# Patient Record
Sex: FEMALE | Race: WHITE | Employment: UNEMPLOYED | URBAN - NONMETROPOLITAN AREA
[De-identification: names, ages, dates, MRNs, and addresses within clinical notes are randomized per-mention and may not be internally consistent; named-entity substitution may affect disease eponyms.]

---

## 2017-07-12 ENCOUNTER — TELEPHONE (OUTPATIENT)
Dept: FAMILY MEDICINE CLINIC | Age: 42
End: 2017-07-12

## 2019-03-21 ENCOUNTER — HOSPITAL ENCOUNTER (EMERGENCY)
Age: 44
Discharge: HOME | End: 2019-03-21
Attending: EMERGENCY MEDICINE
Payer: COMMERCIAL

## 2019-03-21 ENCOUNTER — APPOINTMENT (OUTPATIENT)
Dept: GENERAL RADIOLOGY | Age: 44
End: 2019-03-21
Payer: COMMERCIAL

## 2019-03-21 VITALS
DIASTOLIC BLOOD PRESSURE: 106 MMHG | OXYGEN SATURATION: 94 % | TEMPERATURE: 97.9 F | BODY MASS INDEX: 43 KG/M2 | HEART RATE: 87 BPM | WEIGHT: 219 LBS | SYSTOLIC BLOOD PRESSURE: 145 MMHG | HEIGHT: 60 IN | RESPIRATION RATE: 18 BRPM

## 2019-03-21 DIAGNOSIS — R07.9 CHEST PAIN, UNSPECIFIED TYPE: Primary | ICD-10-CM

## 2019-03-21 DIAGNOSIS — R06.00 DYSPNEA, UNSPECIFIED TYPE: ICD-10-CM

## 2019-03-21 LAB
ALBUMIN SERPL-MCNC: 4.2 GM/DL (ref 3.4–5)
ALP BLD-CCNC: 73 IU/L (ref 40–129)
ALT SERPL-CCNC: 16 U/L (ref 10–40)
ANION GAP SERPL CALCULATED.3IONS-SCNC: 12 MMOL/L (ref 4–16)
AST SERPL-CCNC: 24 IU/L (ref 15–37)
BASOPHILS ABSOLUTE: 0.1 K/CU MM
BASOPHILS RELATIVE PERCENT: 0.6 % (ref 0–1)
BILIRUB SERPL-MCNC: 0.4 MG/DL (ref 0–1)
BUN BLDV-MCNC: 15 MG/DL (ref 6–23)
CALCIUM SERPL-MCNC: 9.8 MG/DL (ref 8.3–10.6)
CHLORIDE BLD-SCNC: 101 MMOL/L (ref 99–110)
CO2: 28 MMOL/L (ref 21–32)
CREAT SERPL-MCNC: 0.7 MG/DL (ref 0.6–1.1)
DIFFERENTIAL TYPE: ABNORMAL
EOSINOPHILS ABSOLUTE: 0.2 K/CU MM
EOSINOPHILS RELATIVE PERCENT: 2.8 % (ref 0–3)
GFR AFRICAN AMERICAN: >60 ML/MIN/1.73M2
GFR NON-AFRICAN AMERICAN: >60 ML/MIN/1.73M2
GLUCOSE BLD-MCNC: 81 MG/DL (ref 70–99)
HCT VFR BLD CALC: 43.4 % (ref 37–47)
HEMOGLOBIN: 13.5 GM/DL (ref 12.5–16)
IMMATURE NEUTROPHIL %: 0.4 % (ref 0–0.43)
LYMPHOCYTES ABSOLUTE: 2.2 K/CU MM
LYMPHOCYTES RELATIVE PERCENT: 26.9 % (ref 24–44)
MCH RBC QN AUTO: 29.7 PG (ref 27–31)
MCHC RBC AUTO-ENTMCNC: 31.1 % (ref 32–36)
MCV RBC AUTO: 95.4 FL (ref 78–100)
MONOCYTES ABSOLUTE: 0.4 K/CU MM
MONOCYTES RELATIVE PERCENT: 5 % (ref 0–4)
PDW BLD-RTO: 12.9 % (ref 11.7–14.9)
PLATELET # BLD: 289 K/CU MM (ref 140–440)
PMV BLD AUTO: 11.2 FL (ref 7.5–11.1)
POTASSIUM SERPL-SCNC: 4.4 MMOL/L (ref 3.5–5.1)
RBC # BLD: 4.55 M/CU MM (ref 4.2–5.4)
SEGMENTED NEUTROPHILS ABSOLUTE COUNT: 5.2 K/CU MM
SEGMENTED NEUTROPHILS RELATIVE PERCENT: 64.3 % (ref 36–66)
SODIUM BLD-SCNC: 141 MMOL/L (ref 135–145)
TOTAL IMMATURE NEUTOROPHIL: 0.03 K/CU MM
TOTAL PROTEIN: 7.3 GM/DL (ref 6.4–8.2)
TROPONIN T: <0.01 NG/ML
TROPONIN T: <0.01 NG/ML
WBC # BLD: 8.2 K/CU MM (ref 4–10.5)

## 2019-03-21 PROCEDURE — 99285 EMERGENCY DEPT VISIT HI MDM: CPT

## 2019-03-21 PROCEDURE — 6370000000 HC RX 637 (ALT 250 FOR IP): Performed by: EMERGENCY MEDICINE

## 2019-03-21 PROCEDURE — 80053 COMPREHEN METABOLIC PANEL: CPT

## 2019-03-21 PROCEDURE — 71046 X-RAY EXAM CHEST 2 VIEWS: CPT

## 2019-03-21 PROCEDURE — 96374 THER/PROPH/DIAG INJ IV PUSH: CPT

## 2019-03-21 PROCEDURE — 85025 COMPLETE CBC W/AUTO DIFF WBC: CPT

## 2019-03-21 PROCEDURE — 6360000002 HC RX W HCPCS: Performed by: EMERGENCY MEDICINE

## 2019-03-21 PROCEDURE — 84484 ASSAY OF TROPONIN QUANT: CPT

## 2019-03-21 PROCEDURE — 2580000003 HC RX 258: Performed by: EMERGENCY MEDICINE

## 2019-03-21 PROCEDURE — 93005 ELECTROCARDIOGRAM TRACING: CPT | Performed by: EMERGENCY MEDICINE

## 2019-03-21 RX ORDER — ACETAMINOPHEN 500 MG
1000 TABLET ORAL ONCE
Status: COMPLETED | OUTPATIENT
Start: 2019-03-21 | End: 2019-03-21

## 2019-03-21 RX ORDER — ASPIRIN 81 MG/1
324 TABLET, CHEWABLE ORAL ONCE
Status: COMPLETED | OUTPATIENT
Start: 2019-03-21 | End: 2019-03-21

## 2019-03-21 RX ORDER — KETOROLAC TROMETHAMINE 30 MG/ML
15 INJECTION, SOLUTION INTRAMUSCULAR; INTRAVENOUS ONCE
Status: COMPLETED | OUTPATIENT
Start: 2019-03-21 | End: 2019-03-21

## 2019-03-21 RX ORDER — LISINOPRIL 10 MG/1
10 TABLET ORAL ONCE
Status: COMPLETED | OUTPATIENT
Start: 2019-03-21 | End: 2019-03-21

## 2019-03-21 RX ORDER — SODIUM CHLORIDE 0.9 % (FLUSH) 0.9 %
10 SYRINGE (ML) INJECTION PRN
Status: DISCONTINUED | OUTPATIENT
Start: 2019-03-21 | End: 2019-03-21 | Stop reason: HOSPADM

## 2019-03-21 RX ORDER — LISINOPRIL 5 MG/1
5 TABLET ORAL DAILY
Qty: 30 TABLET | Refills: 1 | Status: SHIPPED | OUTPATIENT
Start: 2019-03-21 | End: 2019-09-28

## 2019-03-21 RX ORDER — BUSPIRONE HYDROCHLORIDE 10 MG/1
10 TABLET ORAL 2 TIMES DAILY
COMMUNITY
End: 2020-09-20

## 2019-03-21 RX ORDER — LORAZEPAM 1 MG/1
1 TABLET ORAL ONCE
Status: COMPLETED | OUTPATIENT
Start: 2019-03-21 | End: 2019-03-21

## 2019-03-21 RX ADMIN — LORAZEPAM 1 MG: 1 TABLET ORAL at 16:26

## 2019-03-21 RX ADMIN — ACETAMINOPHEN 1000 MG: 500 TABLET ORAL at 17:22

## 2019-03-21 RX ADMIN — ASPIRIN 81 MG 324 MG: 81 TABLET ORAL at 16:26

## 2019-03-21 RX ADMIN — SODIUM CHLORIDE, PRESERVATIVE FREE 10 ML: 5 INJECTION INTRAVENOUS at 16:33

## 2019-03-21 RX ADMIN — KETOROLAC TROMETHAMINE 15 MG: 30 INJECTION, SOLUTION INTRAMUSCULAR; INTRAVENOUS at 17:50

## 2019-03-21 RX ADMIN — SODIUM CHLORIDE, PRESERVATIVE FREE 10 ML: 5 INJECTION INTRAVENOUS at 17:50

## 2019-03-21 RX ADMIN — LISINOPRIL 10 MG: 10 TABLET ORAL at 18:29

## 2019-03-21 ASSESSMENT — PAIN DESCRIPTION - DESCRIPTORS: DESCRIPTORS: SHARP

## 2019-03-21 ASSESSMENT — PAIN DESCRIPTION - LOCATION
LOCATION: CHEST
LOCATION: CHEST

## 2019-03-21 ASSESSMENT — PAIN SCALES - GENERAL
PAINLEVEL_OUTOF10: 7

## 2019-03-21 ASSESSMENT — PAIN DESCRIPTION - PAIN TYPE: TYPE: ACUTE PAIN

## 2019-03-21 ASSESSMENT — HEART SCORE: ECG: 0

## 2019-03-21 ASSESSMENT — PAIN DESCRIPTION - ORIENTATION: ORIENTATION: MID;UPPER

## 2019-03-22 LAB
EKG ATRIAL RATE: 84 BPM
EKG DIAGNOSIS: NORMAL
EKG P AXIS: 40 DEGREES
EKG P-R INTERVAL: 162 MS
EKG Q-T INTERVAL: 374 MS
EKG QRS DURATION: 82 MS
EKG QTC CALCULATION (BAZETT): 441 MS
EKG R AXIS: 27 DEGREES
EKG T AXIS: 36 DEGREES
EKG VENTRICULAR RATE: 84 BPM

## 2019-03-22 PROCEDURE — 93010 ELECTROCARDIOGRAM REPORT: CPT | Performed by: INTERNAL MEDICINE

## 2019-04-09 ENCOUNTER — HOSPITAL ENCOUNTER (EMERGENCY)
Age: 44
Discharge: HOME | End: 2019-04-09
Attending: EMERGENCY MEDICINE
Payer: COMMERCIAL

## 2019-04-09 VITALS
SYSTOLIC BLOOD PRESSURE: 99 MMHG | HEART RATE: 89 BPM | WEIGHT: 219 LBS | RESPIRATION RATE: 14 BRPM | HEIGHT: 60 IN | BODY MASS INDEX: 43 KG/M2 | DIASTOLIC BLOOD PRESSURE: 68 MMHG | TEMPERATURE: 98.9 F | OXYGEN SATURATION: 99 %

## 2019-04-09 DIAGNOSIS — R11.2 NON-INTRACTABLE VOMITING WITH NAUSEA, UNSPECIFIED VOMITING TYPE: Primary | ICD-10-CM

## 2019-04-09 PROCEDURE — 2580000003 HC RX 258: Performed by: EMERGENCY MEDICINE

## 2019-04-09 PROCEDURE — 99283 EMERGENCY DEPT VISIT LOW MDM: CPT

## 2019-04-09 PROCEDURE — 96374 THER/PROPH/DIAG INJ IV PUSH: CPT

## 2019-04-09 PROCEDURE — 6360000002 HC RX W HCPCS: Performed by: EMERGENCY MEDICINE

## 2019-04-09 RX ORDER — ZOLPIDEM TARTRATE 5 MG/1
5 TABLET ORAL NIGHTLY PRN
COMMUNITY
End: 2019-09-28

## 2019-04-09 RX ORDER — ONDANSETRON 2 MG/ML
4 INJECTION INTRAMUSCULAR; INTRAVENOUS EVERY 6 HOURS PRN
Status: DISCONTINUED | OUTPATIENT
Start: 2019-04-09 | End: 2019-04-09 | Stop reason: HOSPADM

## 2019-04-09 RX ORDER — ONDANSETRON 4 MG/1
4 TABLET, ORALLY DISINTEGRATING ORAL EVERY 8 HOURS PRN
Qty: 8 TABLET | Refills: 1 | Status: SHIPPED | OUTPATIENT
Start: 2019-04-09 | End: 2019-09-28

## 2019-04-09 RX ORDER — 0.9 % SODIUM CHLORIDE 0.9 %
1000 INTRAVENOUS SOLUTION INTRAVENOUS ONCE
Status: COMPLETED | OUTPATIENT
Start: 2019-04-09 | End: 2019-04-09

## 2019-04-09 RX ADMIN — ONDANSETRON 4 MG: 2 INJECTION INTRAMUSCULAR; INTRAVENOUS at 09:35

## 2019-04-09 RX ADMIN — SODIUM CHLORIDE 1000 ML: 9 INJECTION, SOLUTION INTRAVENOUS at 09:35

## 2019-04-09 ASSESSMENT — ENCOUNTER SYMPTOMS
VOMITING: 1
NAUSEA: 1
ABDOMINAL PAIN: 0
DIARRHEA: 0
RESPIRATORY NEGATIVE: 1

## 2019-04-09 ASSESSMENT — PAIN DESCRIPTION - DESCRIPTORS: DESCRIPTORS: CRAMPING

## 2019-04-09 ASSESSMENT — PAIN DESCRIPTION - LOCATION: LOCATION: ABDOMEN

## 2019-04-09 ASSESSMENT — PAIN DESCRIPTION - PAIN TYPE: TYPE: ACUTE PAIN

## 2019-04-09 ASSESSMENT — PAIN SCALES - GENERAL: PAINLEVEL_OUTOF10: 7

## 2019-04-09 NOTE — ED PROVIDER NOTES
Triage Chief Complaint:    Emesis (Pt arrives ambulatory stating since 1030 last night she has been vomiting, unable to hold down water, body aching and feels like she is starting to have diarrhea)    LD Mendez is a 45 y.o. female that presents To the ED by result of about onset of nausea and vomiting abdominal cramping. She has runs of family members with similar symptoms recently her symptoms began at 10:30. She was up all night vomiting about 20 episodes. She has not had any diarrhea. She denies any fevers or chills myalgias. She is on no recent atraumatic sob. No recent international travel. There is been no blood or mucus per rectum.   No other high-risk features or red flags noted    Past Medical History:   Diagnosis Date    Anxiety     Anxiety     Depression     Headache     migraines    Palpitation     Thyroid disease      Past Surgical History:   Procedure Laterality Date    BREAST REDUCTION SURGERY      HYSTERECTOMY      THYROIDECTOMY, PARTIAL      1992     Family History   Problem Relation Age of Onset    Cancer Mother     Heart Disease Mother     Cancer Father     Heart Disease Father      Social History     Socioeconomic History    Marital status:      Spouse name: Not on file    Number of children: Not on file    Years of education: Not on file    Highest education level: Not on file   Occupational History    Not on file   Social Needs    Financial resource strain: Not on file    Food insecurity:     Worry: Not on file     Inability: Not on file    Transportation needs:     Medical: Not on file     Non-medical: Not on file   Tobacco Use    Smoking status: Never Smoker    Smokeless tobacco: Never Used   Substance and Sexual Activity    Alcohol use: No    Drug use: No    Sexual activity: Yes     Partners: Male   Lifestyle    Physical activity:     Days per week: Not on file     Minutes per session: Not on file    Stress: Not on file   Relationships    Social connections:     Talks on phone: Not on file     Gets together: Not on file     Attends Yazidi service: Not on file     Active member of club or organization: Not on file     Attends meetings of clubs or organizations: Not on file     Relationship status: Not on file    Intimate partner violence:     Fear of current or ex partner: Not on file     Emotionally abused: Not on file     Physically abused: Not on file     Forced sexual activity: Not on file   Other Topics Concern    Not on file   Social History Narrative    Not on file     Current Facility-Administered Medications   Medication Dose Route Frequency Provider Last Rate Last Dose    0.9 % sodium chloride bolus  1,000 mL Intravenous Once Benay Sooe, DO        ondansetron [facility]) injection 4 mg  4 mg Intravenous Q6H PRN Benjace Tripathi, DO         Current Outpatient Medications   Medication Sig Dispense Refill    zolpidem (AMBIEN) 5 MG tablet Take 5 mg by mouth nightly as needed for Sleep.  busPIRone (BUSPAR) 10 MG tablet Take 10 mg by mouth 2 times daily      lisinopril (PRINIVIL;ZESTRIL) 5 MG tablet Take 1 tablet by mouth daily 30 tablet 1    sertraline (ZOLOFT) 100 MG tablet Take 150 mg by mouth daily      acetaminophen (APAP EXTRA STRENGTH) 500 MG tablet Take 1 tablet by mouth every 6 hours as needed for Pain 120 tablet 0     Allergies   Allergen Reactions    Benadryl [Diphenhydramine] Anxiety     Severe anxiety and agitation    Nsaids Rash         ROS:    Review of Systems   Respiratory: Negative. Cardiovascular: Negative. Gastrointestinal: Positive for nausea and vomiting. Negative for abdominal pain and diarrhea. All other systems reviewed and are negative.       Nursing Notes Reviewed    Physical Exam:  ED Triage Vitals [04/09/19 0912]   Enc Vitals Group      /85      Pulse 96      Resp 18      Temp 98.9 °F (37.2 °C)      Temp Source Oral      SpO2 99 %      Weight 219 lb (99.3 kg)      Height 5' (1.524 m) Head Circumference       Peak Flow       Pain Score       Pain Loc       Pain Edu? Excl. in [address]? Physical Exam   Constitutional: She is oriented to person, place, and time. Vital signs are normal. She appears well-developed and well-nourished. She appears ill. No distress. HENT:   Head: Normocephalic and atraumatic. Right Ear: External ear normal.   Left Ear: External ear normal.   Nose: Nose normal.   Mouth/Throat: Oropharynx is clear and moist.   Eyes: Pupils are equal, round, and reactive to light. Neck: Normal range of motion. Neck supple. Cardiovascular: Normal rate, regular rhythm, normal heart sounds and intact distal pulses. Pulmonary/Chest: Effort normal and breath sounds normal.   Abdominal: Bowel sounds are normal. She exhibits no distension and no mass. There is no tenderness. There is no rebound and no guarding. No hernia. Musculoskeletal: Normal range of motion. Neurological: She is alert and oriented to person, place, and time. Sensory deficit:      Skin: Skin is warm and dry. Capillary refill takes less than 2 seconds. She is not diaphoretic. Psychiatric: She has a normal mood and affect. Nursing note and vitals reviewed. I have reviewed and interpreted all of the currently available lab results from this visit (ifapplicable):  No results found for this visit on 04/09/19.    Radiographs (if obtained):  [] The following radiograph wasinterpreted by myself in the absence of a radiologist:   [] Radiologist's Report Reviewed:  No orders to display         EKG (if obtained): (All EKG's are interpreted by myself in the absence of a cardiologist)    Chart review shows recent radiographs:  Xr Chest Standard (2 Vw)    Result Date: 3/21/2019  EXAMINATION: TWO VIEWS OF THE CHEST 3/21/2019 4:12 pm COMPARISON: Chest x-ray January 20, 2018 HISTORY: ORDERING SYSTEM PROVIDED HISTORY: shortness of breath, CP TECHNOLOGIST PROVIDED HISTORY: Reason for exam:->shortness of breath, CP

## 2019-09-28 ENCOUNTER — HOSPITAL ENCOUNTER (EMERGENCY)
Age: 44
Discharge: HOME | End: 2019-09-28
Attending: EMERGENCY MEDICINE
Payer: COMMERCIAL

## 2019-09-28 ENCOUNTER — APPOINTMENT (OUTPATIENT)
Dept: CT IMAGING | Age: 44
End: 2019-09-28
Payer: COMMERCIAL

## 2019-09-28 VITALS
RESPIRATION RATE: 16 BRPM | HEART RATE: 79 BPM | HEIGHT: 60 IN | OXYGEN SATURATION: 100 % | DIASTOLIC BLOOD PRESSURE: 70 MMHG | TEMPERATURE: 98.4 F | WEIGHT: 230 LBS | SYSTOLIC BLOOD PRESSURE: 116 MMHG | BODY MASS INDEX: 45.16 KG/M2

## 2019-09-28 DIAGNOSIS — R10.13 ABDOMINAL PAIN, EPIGASTRIC: Primary | ICD-10-CM

## 2019-09-28 LAB
ALBUMIN SERPL-MCNC: 4.6 GM/DL (ref 3.4–5)
ALP BLD-CCNC: 82 IU/L (ref 40–129)
ALT SERPL-CCNC: 16 U/L (ref 10–40)
ANION GAP SERPL CALCULATED.3IONS-SCNC: 4 MMOL/L (ref 4–16)
AST SERPL-CCNC: 20 IU/L (ref 15–37)
BACTERIA: ABNORMAL /HPF
BASOPHILS ABSOLUTE: 0.1 K/CU MM
BASOPHILS RELATIVE PERCENT: 0.7 % (ref 0–1)
BILIRUB SERPL-MCNC: 0.9 MG/DL (ref 0–1)
BILIRUBIN URINE: NEGATIVE MG/DL
BLOOD, URINE: NEGATIVE
BUN BLDV-MCNC: 16 MG/DL (ref 6–23)
CALCIUM SERPL-MCNC: 9.9 MG/DL (ref 8.3–10.6)
CAST TYPE: NEGATIVE /HPF
CHLORIDE BLD-SCNC: 101 MMOL/L (ref 99–110)
CLARITY: ABNORMAL
CO2: 37 MMOL/L (ref 21–32)
COLOR: YELLOW
CREAT SERPL-MCNC: 0.8 MG/DL (ref 0.6–1.1)
CRYSTAL TYPE: NEGATIVE /HPF
DIFFERENTIAL TYPE: ABNORMAL
EOSINOPHILS ABSOLUTE: 0.3 K/CU MM
EOSINOPHILS RELATIVE PERCENT: 4.8 % (ref 0–3)
EPITHELIAL CELLS, UA: ABNORMAL /HPF
GFR AFRICAN AMERICAN: >60 ML/MIN/1.73M2
GFR NON-AFRICAN AMERICAN: >60 ML/MIN/1.73M2
GLUCOSE BLD-MCNC: 94 MG/DL (ref 70–99)
GLUCOSE, URINE: NEGATIVE MG/DL
HCT VFR BLD CALC: 41.1 % (ref 37–47)
HEMOGLOBIN: 12.9 GM/DL (ref 12.5–16)
IMMATURE NEUTROPHIL %: 0.6 % (ref 0–0.43)
KETONES, URINE: NEGATIVE MG/DL
LACTATE: 0.7 MMOL/L (ref 0.4–2)
LEUKOCYTE ESTERASE, URINE: NEGATIVE
LIPASE: 39 IU/L (ref 13–60)
LYMPHOCYTES ABSOLUTE: 1.7 K/CU MM
LYMPHOCYTES RELATIVE PERCENT: 24.6 % (ref 24–44)
MCH RBC QN AUTO: 29.7 PG (ref 27–31)
MCHC RBC AUTO-ENTMCNC: 31.4 % (ref 32–36)
MCV RBC AUTO: 94.7 FL (ref 78–100)
MONOCYTES ABSOLUTE: 0.4 K/CU MM
MONOCYTES RELATIVE PERCENT: 5.3 % (ref 0–4)
NITRITE URINE, QUANTITATIVE: NEGATIVE
PDW BLD-RTO: 13.3 % (ref 11.7–14.9)
PH, URINE: 6.5 (ref 5–8)
PLATELET # BLD: 291 K/CU MM (ref 140–440)
PMV BLD AUTO: 10.7 FL (ref 7.5–11.1)
POTASSIUM SERPL-SCNC: 3.9 MMOL/L (ref 3.5–5.1)
PROTEIN UA: NEGATIVE MG/DL
RBC # BLD: 4.34 M/CU MM (ref 4.2–5.4)
RBC URINE: NEGATIVE /HPF (ref 0–6)
SEGMENTED NEUTROPHILS ABSOLUTE COUNT: 4.4 K/CU MM
SEGMENTED NEUTROPHILS RELATIVE PERCENT: 64 % (ref 36–66)
SODIUM BLD-SCNC: 142 MMOL/L (ref 135–145)
SPECIFIC GRAVITY UA: 1.01 (ref 1–1.03)
TOTAL IMMATURE NEUTOROPHIL: 0.04 K/CU MM
TOTAL PROTEIN: 7.6 GM/DL (ref 6.4–8.2)
UROBILINOGEN, URINE: 0.2 MG/DL (ref 0.2–1)
WBC # BLD: 6.9 K/CU MM (ref 4–10.5)
WBC UA: NEGATIVE /HPF (ref 0–5)

## 2019-09-28 PROCEDURE — 80053 COMPREHEN METABOLIC PANEL: CPT

## 2019-09-28 PROCEDURE — 6360000002 HC RX W HCPCS: Performed by: EMERGENCY MEDICINE

## 2019-09-28 PROCEDURE — 2500000003 HC RX 250 WO HCPCS: Performed by: EMERGENCY MEDICINE

## 2019-09-28 PROCEDURE — 85025 COMPLETE CBC W/AUTO DIFF WBC: CPT

## 2019-09-28 PROCEDURE — 96374 THER/PROPH/DIAG INJ IV PUSH: CPT

## 2019-09-28 PROCEDURE — 96375 TX/PRO/DX INJ NEW DRUG ADDON: CPT

## 2019-09-28 PROCEDURE — 2580000003 HC RX 258: Performed by: EMERGENCY MEDICINE

## 2019-09-28 PROCEDURE — 99284 EMERGENCY DEPT VISIT MOD MDM: CPT

## 2019-09-28 PROCEDURE — 6360000004 HC RX CONTRAST MEDICATION: Performed by: EMERGENCY MEDICINE

## 2019-09-28 PROCEDURE — 74177 CT ABD & PELVIS W/CONTRAST: CPT

## 2019-09-28 PROCEDURE — 83690 ASSAY OF LIPASE: CPT

## 2019-09-28 PROCEDURE — 83605 ASSAY OF LACTIC ACID: CPT

## 2019-09-28 PROCEDURE — 81001 URINALYSIS AUTO W/SCOPE: CPT

## 2019-09-28 RX ORDER — 0.9 % SODIUM CHLORIDE 0.9 %
1000 INTRAVENOUS SOLUTION INTRAVENOUS ONCE
Status: COMPLETED | OUTPATIENT
Start: 2019-09-28 | End: 2019-09-28

## 2019-09-28 RX ORDER — FAMOTIDINE 20 MG/1
20 TABLET, FILM COATED ORAL 2 TIMES DAILY
Qty: 60 TABLET | Refills: 0 | Status: SHIPPED | OUTPATIENT
Start: 2019-09-28 | End: 2020-03-01 | Stop reason: ALTCHOICE

## 2019-09-28 RX ORDER — DICYCLOMINE HYDROCHLORIDE 10 MG/1
20 CAPSULE ORAL EVERY 6 HOURS PRN
Qty: 40 CAPSULE | Refills: 0 | Status: SHIPPED | OUTPATIENT
Start: 2019-09-28 | End: 2020-03-01 | Stop reason: ALTCHOICE

## 2019-09-28 RX ORDER — SODIUM CHLORIDE 9 MG/ML
INJECTION, SOLUTION INTRAVENOUS CONTINUOUS
Status: DISCONTINUED | OUTPATIENT
Start: 2019-09-28 | End: 2019-09-28 | Stop reason: HOSPADM

## 2019-09-28 RX ORDER — ONDANSETRON 2 MG/ML
4 INJECTION INTRAMUSCULAR; INTRAVENOUS EVERY 30 MIN PRN
Status: DISCONTINUED | OUTPATIENT
Start: 2019-09-28 | End: 2019-09-28 | Stop reason: HOSPADM

## 2019-09-28 RX ORDER — ONDANSETRON 4 MG/1
4 TABLET, ORALLY DISINTEGRATING ORAL EVERY 6 HOURS PRN
Qty: 10 TABLET | Refills: 0 | Status: SHIPPED | OUTPATIENT
Start: 2019-09-28 | End: 2020-03-01 | Stop reason: ALTCHOICE

## 2019-09-28 RX ADMIN — IOPAMIDOL 75 ML: 755 INJECTION, SOLUTION INTRAVENOUS at 13:15

## 2019-09-28 RX ADMIN — ONDANSETRON 4 MG: 2 INJECTION INTRAMUSCULAR; INTRAVENOUS at 12:46

## 2019-09-28 RX ADMIN — FAMOTIDINE 20 MG: 10 INJECTION, SOLUTION INTRAVENOUS at 12:46

## 2019-09-28 RX ADMIN — SODIUM CHLORIDE 1000 ML: 9 INJECTION, SOLUTION INTRAVENOUS at 12:45

## 2019-09-28 RX ADMIN — SODIUM CHLORIDE: 9 INJECTION, SOLUTION INTRAVENOUS at 12:45

## 2019-09-28 ASSESSMENT — PAIN DESCRIPTION - ORIENTATION: ORIENTATION: MID;UPPER

## 2019-09-28 ASSESSMENT — PAIN DESCRIPTION - PAIN TYPE: TYPE: ACUTE PAIN

## 2019-09-28 ASSESSMENT — PAIN DESCRIPTION - DESCRIPTORS: DESCRIPTORS: ACHING;CONSTANT

## 2019-09-28 ASSESSMENT — PAIN SCALES - GENERAL: PAINLEVEL_OUTOF10: 7

## 2019-09-28 ASSESSMENT — PAIN DESCRIPTION - LOCATION: LOCATION: ABDOMEN

## 2020-03-01 ENCOUNTER — HOSPITAL ENCOUNTER (EMERGENCY)
Age: 45
Discharge: HOME | End: 2020-03-01
Attending: EMERGENCY MEDICINE

## 2020-03-01 ENCOUNTER — APPOINTMENT (OUTPATIENT)
Dept: ULTRASOUND IMAGING | Age: 45
End: 2020-03-01

## 2020-03-01 VITALS
BODY MASS INDEX: 46.72 KG/M2 | HEART RATE: 97 BPM | OXYGEN SATURATION: 99 % | DIASTOLIC BLOOD PRESSURE: 80 MMHG | SYSTOLIC BLOOD PRESSURE: 150 MMHG | TEMPERATURE: 98.7 F | HEIGHT: 60 IN | WEIGHT: 238 LBS | RESPIRATION RATE: 16 BRPM

## 2020-03-01 PROCEDURE — 93971 EXTREMITY STUDY: CPT

## 2020-03-01 PROCEDURE — 99284 EMERGENCY DEPT VISIT MOD MDM: CPT

## 2020-03-01 PROCEDURE — 6370000000 HC RX 637 (ALT 250 FOR IP): Performed by: EMERGENCY MEDICINE

## 2020-03-01 RX ORDER — METHYLPREDNISOLONE 4 MG/1
TABLET ORAL
Qty: 1 KIT | Refills: 0 | Status: SHIPPED | OUTPATIENT
Start: 2020-03-01 | End: 2020-04-05 | Stop reason: ALTCHOICE

## 2020-03-01 RX ORDER — CYCLOBENZAPRINE HCL 10 MG
10 TABLET ORAL 3 TIMES DAILY PRN
Qty: 30 TABLET | Refills: 0 | Status: SHIPPED | OUTPATIENT
Start: 2020-03-01 | End: 2020-03-11

## 2020-03-01 RX ORDER — HYDROCODONE BITARTRATE AND ACETAMINOPHEN 5; 325 MG/1; MG/1
1 TABLET ORAL ONCE
Status: COMPLETED | OUTPATIENT
Start: 2020-03-01 | End: 2020-03-01

## 2020-03-01 RX ADMIN — HYDROCODONE BITARTRATE AND ACETAMINOPHEN 1 TABLET: 5; 325 TABLET ORAL at 11:41

## 2020-03-01 ASSESSMENT — PAIN DESCRIPTION - DESCRIPTORS: DESCRIPTORS: SHARP;ACHING

## 2020-03-01 ASSESSMENT — PAIN DESCRIPTION - ORIENTATION: ORIENTATION: MID;LOWER

## 2020-03-01 ASSESSMENT — PAIN SCALES - GENERAL: PAINLEVEL_OUTOF10: 7

## 2020-03-01 ASSESSMENT — PAIN DESCRIPTION - PAIN TYPE: TYPE: ACUTE PAIN

## 2020-03-01 ASSESSMENT — PAIN DESCRIPTION - LOCATION: LOCATION: LEG;KNEE

## 2020-03-01 ASSESSMENT — PAIN DESCRIPTION - FREQUENCY: FREQUENCY: CONTINUOUS

## 2020-03-01 NOTE — ED PROVIDER NOTES
Emergency Department Encounter  Location: [place] [facility] [address] [place] [address]    Patient: Lia Villaseñor  MRN: [id number]  : 1975  Date of evaluation: 3/1/2020  ED Provider: Ava Morales DO, FACEP    Chief Complaint:    Leg Pain ( began having pain in left leg behind knee radiating down into her foot. Pain begining around 0200 this morning.  has been taking tylenol with little relief. Denies recent accident or injury. )    [place]:  Lia Villaseñor is a 39 y.o. female that presents to the emergency department with complaints of pain in her left leg that awoke her from sleep at 2 AM.  She is complaining of pain in her left posterior thigh which radiates downward into the popliteal space of her left knee. She also has pain along the entire lower extremity from her knee down to her ankle. ROS:  At least 4 systems reviewed and otherwise acutely negative except as in the [address] Past Medical History:   Diagnosis Date    Anxiety     Anxiety     Depression     Headache     migraines    Palpitation     Thyroid disease      Past Surgical History:   Procedure Laterality Date    BREAST REDUCTION SURGERY      HYSTERECTOMY      THYROIDECTOMY, PARTIAL           Family History   Problem Relation Age of Onset    Cancer Mother     Heart Disease Mother     Cancer Father     Heart Disease Father      Social History     Socioeconomic History    Marital status:      Spouse name: Not on file    Number of children: Not on file    Years of education: Not on file    Highest education level: Not on file   Occupational History    Not on file   Social Needs    Financial resource strain: Not on file    Food insecurity:     Worry: Not on file     Inability: Not on file    Transportation needs:     Medical: Not on file     Non-medical: Not on file   Tobacco Use    Smoking status: Never Smoker    Smokeless tobacco: Never Used   Substance and Sexual Activity    Alcohol use: No    Drug use:  No occasionally words are mis-transcribed.)    Alfredo Carias DO, [address] [facility]  Board certified in [address] [place] [address] Gamal Carias, [place]  03/01/20 2460

## 2020-04-05 ENCOUNTER — HOSPITAL ENCOUNTER (EMERGENCY)
Age: 45
Discharge: HOME | End: 2020-04-05
Attending: EMERGENCY MEDICINE
Payer: COMMERCIAL

## 2020-04-05 VITALS
SYSTOLIC BLOOD PRESSURE: 128 MMHG | TEMPERATURE: 99.8 F | OXYGEN SATURATION: 100 % | RESPIRATION RATE: 17 BRPM | HEART RATE: 82 BPM | DIASTOLIC BLOOD PRESSURE: 75 MMHG | BODY MASS INDEX: 47.12 KG/M2 | WEIGHT: 240 LBS | HEIGHT: 60 IN

## 2020-04-05 PROCEDURE — 87635 SARS-COV-2 COVID-19 AMP PRB: CPT

## 2020-04-05 PROCEDURE — U0002 COVID-19 LAB TEST NON-CDC: HCPCS

## 2020-04-05 PROCEDURE — ~ 87635 HC SO COVID-19 ANY TECHNIQUE NON-CDC

## 2020-04-05 PROCEDURE — 99283 EMERGENCY DEPT VISIT LOW MDM: CPT

## 2020-04-05 RX ORDER — ACETAMINOPHEN 500 MG
1000 TABLET ORAL ONCE
Status: DISCONTINUED | OUTPATIENT
Start: 2020-04-05 | End: 2020-04-05 | Stop reason: HOSPADM

## 2020-04-05 ASSESSMENT — ENCOUNTER SYMPTOMS
APNEA: 0
DIARRHEA: 1
TROUBLE SWALLOWING: 0
NAUSEA: 0
RECTAL PAIN: 0
VOICE CHANGE: 0
BACK PAIN: 0
CHEST TIGHTNESS: 0
ANAL BLEEDING: 0
VOMITING: 0
WHEEZING: 0
CHOKING: 0
STRIDOR: 0
SORE THROAT: 1
BLOOD IN STOOL: 0
CONSTIPATION: 0
ABDOMINAL PAIN: 0
ABDOMINAL DISTENTION: 0
COUGH: 1
SHORTNESS OF BREATH: 0
EYE PAIN: 0

## 2020-04-05 NOTE — ED PROVIDER NOTES
reviewed. REVIEW OF SYSTEMS    (2-9 systems for level 4, 10 or more for level 5)     Review of Systems   Constitutional: Positive for chills, fatigue and fever. Negative for activity change, appetite change, diaphoresis and unexpected weight change. HENT: Positive for congestion and sore throat. Negative for trouble swallowing and voice change. Eyes: Negative for pain and visual disturbance. Respiratory: Positive for cough. Negative for apnea, choking, chest tightness, shortness of breath, wheezing and stridor. Cardiovascular: Negative for chest pain, palpitations and leg swelling. Gastrointestinal: Positive for diarrhea. Negative for abdominal distention, abdominal pain, anal bleeding, blood in stool, constipation, nausea, rectal pain and vomiting. Endocrine: Negative for cold intolerance and heat intolerance. Genitourinary: Negative for decreased urine volume, dysuria and flank pain. Musculoskeletal: Positive for myalgias. Negative for arthralgias, back pain, neck pain and neck stiffness. Skin: Negative for rash and wound. Allergic/Immunologic: Negative for immunocompromised state. Neurological: Positive for headaches. Negative for weakness, light-headedness and numbness. Hematological: Negative for adenopathy. Does not bruise/bleed easily. Psychiatric/Behavioral: Negative for self-injury and suicidal ideas. Except as noted above the remainder of the review of systems was reviewed and negative.        PAST MEDICAL HISTORY     Past Medical History:   Diagnosis Date    Anxiety     Anxiety     Depression     Headache     migraines    Palpitation     Thyroid disease          SURGICAL HISTORY       Past Surgical History:   Procedure Laterality Date    BREAST REDUCTION SURGERY      HYSTERECTOMY      THYROIDECTOMY, PARTIAL      1992         CURRENT MEDICATIONS       Previous Medications    ACETAMINOPHEN (APAP EXTRA STRENGTH) 500 MG TABLET    Take 1 tablet by mouth every 6 ED Physician - none    LABS:  Labs Reviewed   COVID-19       All other labs were within normal range or not returned as of this dictation. EMERGENCY DEPARTMENT COURSE and DIFFERENTIAL DIAGNOSIS/MDM:   Vitals:    Vitals:    04/05/20 1133   BP: 128/82   Pulse: 83   Resp: 16   Temp: 100.4 °F (38 °C)   TempSrc: Oral   SpO2: 100%   Weight: 240 lb (108.9 kg)   Height: 5' (1.524 m)           MDM  Number of Diagnoses or Management Options  Diagnosis management comments: 44-year-old female presents emerge department with URI symptoms as described above. In the emergency room, patient is febrile but otherwise chemically normal acute distress nontoxic. Patient is not hypoxemic, not tachypneic not tachycardic normotensive clinically euvolemic breathing well lungs are clear. Differential includes likely viral URI. COVID is certainly consideration given the patient's typical viral symptoms. Doubt occult sepsis or serious bacterial infection. X-ray was deferred as you know there is pneumonia, most certainly viral.  The patient's BMI is greater than 40 and therefore pursuing priority 2 testing. Upon reevaluation, patient said that she felt well want to go home felt comfortable doing so. We discussed the importance of outpatient follow-up as well as strict return precautions including new change worsening symptoms.   Patient verbally acknowledged understood these instructions agreeable plan       Amount and/or Complexity of Data Reviewed  Clinical lab tests: ordered  Review and summarize past medical records: yes          REASSESSMENT              CONSULTS:  None    PROCEDURES:  Unless otherwise noted below, none     Procedures        FINAL IMPRESSION      1. COVID-19          DISPOSITION/PLAN   DISPOSITION Discharge - Pending Orders Complete 04/05/2020 11:57:21 AM      PATIENT REFERRED TO:  [facility] Emergency Department  [address] [facility]  [phone]    As needed if you have any health

## 2020-04-06 ENCOUNTER — CARE COORDINATION (OUTPATIENT)
Dept: CARE COORDINATION | Age: 45
End: 2020-04-06

## 2020-04-07 LAB
SARS-COV-2: NORMAL
SOURCE: NORMAL

## 2020-09-20 ENCOUNTER — HOSPITAL ENCOUNTER (EMERGENCY)
Age: 45
Discharge: HOME | End: 2020-09-20
Attending: EMERGENCY MEDICINE

## 2020-09-20 VITALS
WEIGHT: 240 LBS | HEART RATE: 82 BPM | TEMPERATURE: 98.1 F | RESPIRATION RATE: 16 BRPM | BODY MASS INDEX: 47.12 KG/M2 | DIASTOLIC BLOOD PRESSURE: 96 MMHG | SYSTOLIC BLOOD PRESSURE: 122 MMHG | HEIGHT: 60 IN | OXYGEN SATURATION: 100 %

## 2020-09-20 PROCEDURE — 87081 CULTURE SCREEN ONLY: CPT

## 2020-09-20 PROCEDURE — 6370000000 HC RX 637 (ALT 250 FOR IP): Performed by: EMERGENCY MEDICINE

## 2020-09-20 PROCEDURE — 99283 EMERGENCY DEPT VISIT LOW MDM: CPT

## 2020-09-20 PROCEDURE — 87077 CULTURE AEROBIC IDENTIFY: CPT

## 2020-09-20 PROCEDURE — 87430 STREP A AG IA: CPT

## 2020-09-20 RX ORDER — NAPROXEN 500 MG/1
500 TABLET ORAL ONCE
Status: COMPLETED | OUTPATIENT
Start: 2020-09-20 | End: 2020-09-20

## 2020-09-20 RX ADMIN — NAPROXEN 500 MG: 500 TABLET ORAL at 21:44

## 2020-09-20 ASSESSMENT — PAIN DESCRIPTION - PAIN TYPE: TYPE: ACUTE PAIN

## 2020-09-20 ASSESSMENT — PAIN DESCRIPTION - LOCATION: LOCATION: THROAT

## 2020-09-20 ASSESSMENT — PAIN SCALES - GENERAL
PAINLEVEL_OUTOF10: 6
PAINLEVEL_OUTOF10: 6

## 2020-09-21 ENCOUNTER — CARE COORDINATION (OUTPATIENT)
Dept: CARE COORDINATION | Age: 45
End: 2020-09-21

## 2020-09-21 NOTE — ED PROVIDER NOTES
Triage Chief Complaint:   Pharyngitis (sore throat which started this morning. states coworkers with strep and with Covid symptoms ); Headache (headache which started this morning); and Otalgia (bilateral earaches which started this morning)    [place]:  Geremias Gomez is a 39 y.o. female that presents with 1 day of sore throat worse with swallowing, bilateral earaches, headaches, fatigue. States multiple people at work have had strep throat. She denies any fevers, nausea, vomiting, diarrhea, chest pain, shortness of breath or cough. She has been taking Tylenol today. ROS:  At least 10 systems reviewed and otherwise acutely negative except as in the [address]     Past Medical History:   Diagnosis Date    Anxiety     Anxiety     Depression     Headache     migraines    Palpitation     Thyroid disease      Past Surgical History:   Procedure Laterality Date    BREAST REDUCTION SURGERY      HYSTERECTOMY      THYROIDECTOMY, PARTIAL      1992    TYMPANOSTOMY TUBE PLACEMENT       Family History   Problem Relation Age of Onset    Cancer Mother     Heart Disease Mother     Cancer Father     Heart Disease Father      Social History     Socioeconomic History    Marital status:      Spouse name: Not on file    Number of children: Not on file    Years of education: Not on file    Highest education level: Not on file   Occupational History    Not on file   Social Needs    Financial resource strain: Not on file    Food insecurity     Worry: Not on file     Inability: Not on file    Transportation needs     Medical: Not on file     Non-medical: Not on file   Tobacco Use    Smoking status: Never Smoker    Smokeless tobacco: Never Used   Substance and Sexual Activity    Alcohol use: No    Drug use: No    Sexual activity: Yes     Partners: Male   Lifestyle    Physical activity     Days per week: Not on file     Minutes per session: Not on file    Stress: Not on file   Relationships    Social connections Talks on phone: Not on file     Gets together: Not on file     Attends Jewish service: Not on file     Active member of club or organization: Not on file     Attends meetings of clubs or organizations: Not on file     Relationship status: Not on file    Intimate partner violence     Fear of current or ex partner: Not on file     Emotionally abused: Not on file     Physically abused: Not on file     Forced sexual activity: Not on file   Other Topics Concern    Not on file   Social History Narrative    Not on file     No current facility-administered medications for this encounter. Current Outpatient Medications   Medication Sig Dispense Refill    sertraline (ZOLOFT) 100 MG tablet Take 150 mg by mouth daily      acetaminophen (APAP EXTRA STRENGTH) 500 MG tablet Take 1 tablet by mouth every 6 hours as needed for Pain 120 tablet 0     Allergies   Allergen Reactions    Benadryl [Diphenhydramine] Anxiety     Severe anxiety and agitation. States can take it oral but not IV    Nsaids Rash       Nursing Notes Reviewed    Physical Exam:  ED Triage Vitals   Enc Vitals Group      BP 09/20/20 2126 (!) 122/96      Pulse 09/20/20 2126 82      Resp 09/20/20 2126 16      Temp 09/20/20 2126 98.1 °F (36.7 °C)      Temp Source 09/20/20 2126 Oral      SpO2 09/20/20 2126 100 %      Weight 09/20/20 2124 240 lb (108.9 kg)      Height 09/20/20 2124 5' (1.524 m)      Head Circumference --       Peak Flow --       Pain Score --       Pain Loc --       Pain Edu? --       Excl. in [address]? --      GENERAL APPEARANCE: Awake and alert. Cooperative. No acute distress. HEAD: Normocephalic. Atraumatic. EYES: EOM's grossly intact. Sclera anicteric. ENT: Mucous membranes are moist. Tolerates saliva. No trismus. Posterior pharyngeal erythema without exudate or asymmetry. No tonsillar exudate or hypertrophy. TMs clear bilaterally  NECK: No meningismus. No lymphadenopathy  HEART:  Extremities pink  LUNGS: Respirations unlabored. Even chest rise bilaterally  ABDOMEN: Non distended. EXTREMITIES: No acute deformities. SKIN: Dry  NEUROLOGICAL: No gross facial drooping. Moves all 4 extremities spontaneously. PSYCHIATRIC: Normal mood. I have reviewed and interpreted all of the currently available lab results from this visit (if applicable):  Results for orders placed or performed during the hospital encounter of 09/20/20   Strep Screen Group A Throat    Specimen: Throat   Result Value Ref Range    Specimen THROAT     Special Requests NONE     Strep A Direct Screen NEGATIVE       Radiographs (if obtained):  [] The following radiograph was interpreted by myself in the absence of a radiologist:  [] Radiologist's Report Reviewed:    EKG (if obtained): (All EKG's are interpreted by myself in the absence of a cardiologist)    MDM:  Plan of care is discussed thoroughly with the patient and family if present. If performed, all imaging and lab work also discussed with patient. All relevant prior results and chart reviewed if available. Patient presents as above. She is in no acute distress and has normal vital signs. Presents with signs and symptoms consistent with acute pharyngitis. No evidence of meningitis, deep neck tissue infection or other acute emergent process at this time. Patient is given naproxen for symptoms. Strep screen is negative. She is discharged with return precautions and encouraged to drink plenty of fluids at home, take Tylenol and ibuprofen. Clinical Impression:  1.  Acute pharyngitis, unspecified etiology      (Please note that portions of this note may have been completed with a voice recognition program. Efforts were made to edit the dictations but occasionally words are mis-transcribed.)    MD Krista Mirza MD  09/20/20 [id number]

## 2020-09-23 LAB
CULTURE: NORMAL
Lab: NORMAL
SPECIMEN: NORMAL
STREP A DIRECT SCREEN: NEGATIVE

## 2020-10-13 ENCOUNTER — APPOINTMENT (OUTPATIENT)
Dept: GENERAL RADIOLOGY | Age: 45
End: 2020-10-13

## 2020-10-13 ENCOUNTER — HOSPITAL ENCOUNTER (EMERGENCY)
Age: 45
Discharge: HOME | End: 2020-10-13
Attending: EMERGENCY MEDICINE

## 2020-10-13 VITALS
WEIGHT: 230 LBS | HEART RATE: 79 BPM | DIASTOLIC BLOOD PRESSURE: 98 MMHG | RESPIRATION RATE: 16 BRPM | HEIGHT: 61 IN | TEMPERATURE: 97.9 F | SYSTOLIC BLOOD PRESSURE: 128 MMHG | OXYGEN SATURATION: 97 % | BODY MASS INDEX: 43.43 KG/M2

## 2020-10-13 LAB
ALBUMIN SERPL-MCNC: 4.2 GM/DL (ref 3.4–5)
ALP BLD-CCNC: 80 IU/L (ref 40–129)
ALT SERPL-CCNC: 22 U/L (ref 10–40)
ANION GAP SERPL CALCULATED.3IONS-SCNC: 7 MMOL/L (ref 4–16)
AST SERPL-CCNC: 22 IU/L (ref 15–37)
BASOPHILS ABSOLUTE: 0.1 K/CU MM
BASOPHILS RELATIVE PERCENT: 0.6 % (ref 0–1)
BILIRUB SERPL-MCNC: 0.6 MG/DL (ref 0–1)
BUN BLDV-MCNC: 13 MG/DL (ref 6–23)
CALCIUM SERPL-MCNC: 9.4 MG/DL (ref 8.3–10.6)
CHLORIDE BLD-SCNC: 102 MMOL/L (ref 99–110)
CO2: 32 MMOL/L (ref 21–32)
CREAT SERPL-MCNC: 0.8 MG/DL (ref 0.6–1.1)
DIFFERENTIAL TYPE: ABNORMAL
EOSINOPHILS ABSOLUTE: 0.4 K/CU MM
EOSINOPHILS RELATIVE PERCENT: 3.8 % (ref 0–3)
GFR AFRICAN AMERICAN: >60 ML/MIN/1.73M2
GFR NON-AFRICAN AMERICAN: >60 ML/MIN/1.73M2
GLUCOSE BLD-MCNC: 88 MG/DL (ref 70–99)
HCT VFR BLD CALC: 42.3 % (ref 37–47)
HEMOGLOBIN: 13.2 GM/DL (ref 12.5–16)
IMMATURE NEUTROPHIL %: 0.4 % (ref 0–0.43)
LIPASE: 37 IU/L (ref 13–60)
LYMPHOCYTES ABSOLUTE: 2.8 K/CU MM
LYMPHOCYTES RELATIVE PERCENT: 29.7 % (ref 24–44)
MCH RBC QN AUTO: 29.3 PG (ref 27–31)
MCHC RBC AUTO-ENTMCNC: 31.2 % (ref 32–36)
MCV RBC AUTO: 93.8 FL (ref 78–100)
MONOCYTES ABSOLUTE: 0.5 K/CU MM
MONOCYTES RELATIVE PERCENT: 5.6 % (ref 0–4)
PDW BLD-RTO: 12.8 % (ref 11.7–14.9)
PLATELET # BLD: 283 K/CU MM (ref 140–440)
PMV BLD AUTO: 10.9 FL (ref 7.5–11.1)
POTASSIUM SERPL-SCNC: 4.3 MMOL/L (ref 3.5–5.1)
RBC # BLD: 4.51 M/CU MM (ref 4.2–5.4)
SEGMENTED NEUTROPHILS ABSOLUTE COUNT: 5.7 K/CU MM
SEGMENTED NEUTROPHILS RELATIVE PERCENT: 59.9 % (ref 36–66)
SODIUM BLD-SCNC: 141 MMOL/L (ref 135–145)
TOTAL IMMATURE NEUTOROPHIL: 0.04 K/CU MM
TOTAL PROTEIN: 6.9 GM/DL (ref 6.4–8.2)
TROPONIN T: <0.01 NG/ML
WBC # BLD: 9.6 K/CU MM (ref 4–10.5)

## 2020-10-13 PROCEDURE — 6360000002 HC RX W HCPCS: Performed by: EMERGENCY MEDICINE

## 2020-10-13 PROCEDURE — 80053 COMPREHEN METABOLIC PANEL: CPT

## 2020-10-13 PROCEDURE — 93005 ELECTROCARDIOGRAM TRACING: CPT | Performed by: EMERGENCY MEDICINE

## 2020-10-13 PROCEDURE — 96374 THER/PROPH/DIAG INJ IV PUSH: CPT

## 2020-10-13 PROCEDURE — 83690 ASSAY OF LIPASE: CPT

## 2020-10-13 PROCEDURE — 99285 EMERGENCY DEPT VISIT HI MDM: CPT

## 2020-10-13 PROCEDURE — 85025 COMPLETE CBC W/AUTO DIFF WBC: CPT

## 2020-10-13 PROCEDURE — 71045 X-RAY EXAM CHEST 1 VIEW: CPT

## 2020-10-13 PROCEDURE — 84484 ASSAY OF TROPONIN QUANT: CPT

## 2020-10-13 RX ORDER — CLONAZEPAM 0.5 MG/1
0.5 TABLET ORAL 2 TIMES DAILY PRN
Qty: 6 TABLET | Refills: 0 | Status: SHIPPED | OUTPATIENT
Start: 2020-10-13 | End: 2022-05-13 | Stop reason: ALTCHOICE

## 2020-10-13 RX ORDER — LORAZEPAM 2 MG/ML
2 INJECTION INTRAMUSCULAR ONCE
Status: COMPLETED | OUTPATIENT
Start: 2020-10-13 | End: 2020-10-13

## 2020-10-13 RX ADMIN — LORAZEPAM 2 MG: 2 INJECTION INTRAMUSCULAR; INTRAVENOUS at 22:04

## 2020-10-13 ASSESSMENT — PAIN DESCRIPTION - ORIENTATION: ORIENTATION: LEFT;MID

## 2020-10-13 ASSESSMENT — ENCOUNTER SYMPTOMS
GASTROINTESTINAL NEGATIVE: 1
RESPIRATORY NEGATIVE: 1
EYES NEGATIVE: 1

## 2020-10-13 ASSESSMENT — PAIN SCALES - GENERAL: PAINLEVEL_OUTOF10: 7

## 2020-10-13 ASSESSMENT — PAIN DESCRIPTION - ONSET: ONSET: ON-GOING

## 2020-10-13 ASSESSMENT — PAIN DESCRIPTION - DESCRIPTORS: DESCRIPTORS: ACHING

## 2020-10-13 ASSESSMENT — PAIN DESCRIPTION - PAIN TYPE: TYPE: ACUTE PAIN

## 2020-10-13 ASSESSMENT — PAIN DESCRIPTION - FREQUENCY: FREQUENCY: CONTINUOUS

## 2020-10-13 ASSESSMENT — PAIN DESCRIPTION - LOCATION: LOCATION: CHEST;BACK;HEAD;NECK

## 2020-10-13 ASSESSMENT — PAIN DESCRIPTION - PROGRESSION: CLINICAL_PROGRESSION: GRADUALLY WORSENING

## 2020-10-14 PROCEDURE — 93010 ELECTROCARDIOGRAM REPORT: CPT | Performed by: INTERNAL MEDICINE

## 2020-10-14 NOTE — ED NOTES
Discharge instructions reviewed with patient. Reviewed prescriptions with patient. No additional questions asked. Voiced understanding. Encouraged patient to follow up as discussed by the ED physician.      Joanna Danielson RN  10/13/20 [id number]

## 2020-10-14 NOTE — ED PROVIDER NOTES
The history is provided by the patient. Chest Pain   The patient has been having chest pain which seems to originate from left side of her neck and then radiates down along her chest wall and then into her left arm the patient states she has been crying all day and has been holding her head in an awkward position. This has given the patient's slight exacerbation of her chronic migraine headaches. Patient states also because of her position that she is also been having some pain this been going in between her shoulder blades. The patient has just buried her father who  of throat cancer. The patient states that her high anxiety state has been causing her to have these above symptoms but family members wanted her to come in and be checked out. The patient currently just has the symptoms as noted above but she does not have other associated symptoms    Review of Systems   Constitutional: Negative. HENT: Negative. Eyes: Negative. Respiratory: Negative. Cardiovascular: Positive for chest pain. Gastrointestinal: Negative. Genitourinary: Negative. Musculoskeletal: Negative. Skin: Negative. Neurological: Negative. All other systems reviewed and are negative.       Family History   Problem Relation Age of Onset    Cancer Mother     Heart Disease Mother     Cancer Father     Heart Disease Father      Social History     Socioeconomic History    Marital status:      Spouse name: Not on file    Number of children: Not on file    Years of education: Not on file    Highest education level: Not on file   Occupational History    Not on file   Social Needs    Financial resource strain: Not on file    Food insecurity     Worry: Not on file     Inability: Not on file    Transportation needs     Medical: Not on file     Non-medical: Not on file   Tobacco Use    Smoking status: Never Smoker    Smokeless tobacco: Never Used   Substance and Sexual Activity    Alcohol use: No    Drug use: No    Sexual activity: Yes     Partners: Male   Lifestyle    Physical activity     Days per week: Not on file     Minutes per session: Not on file    Stress: Not on file   Relationships    Social connections     Talks on phone: Not on file     Gets together: Not on file     Attends Christian service: Not on file     Active member of club or organization: Not on file     Attends meetings of clubs or organizations: Not on file     Relationship status: Not on file    Intimate partner violence     Fear of current or ex partner: Not on file     Emotionally abused: Not on file     Physically abused: Not on file     Forced sexual activity: Not on file   Other Topics Concern    Not on file   Social History Narrative    Not on file     Past Surgical History:   Procedure Laterality Date    BREAST REDUCTION SURGERY      HYSTERECTOMY      THYROIDECTOMY, PARTIAL      1992    TYMPANOSTOMY TUBE PLACEMENT       Past Medical History:   Diagnosis Date    Anxiety     Anxiety     Depression     Headache     migraines    Palpitation     Thyroid disease      Allergies   Allergen Reactions    Benadryl [Diphenhydramine] Anxiety     Severe anxiety and agitation. States can take it oral but not IV    Nsaids Rash     Prior to Admission medications    Medication Sig Start Date End Date Taking? Authorizing Provider   clonazePAM (KLONOPIN) 0.5 MG tablet Take 1 tablet by mouth 2 times daily as needed for Anxiety for up to 3 days. 10/13/20 10/16/20 Yes Ankita Calderon DO   acetaminophen (APAP EXTRA STRENGTH) 500 MG tablet Take 1 tablet by mouth every 6 hours as needed for Pain 12/16/17  Yes Randolph Alfaro MD   sertraline (ZOLOFT) 100 MG tablet Take 150 mg by mouth daily    Historical Provider, MD       BP (!) 128/98   Pulse 79   Temp 97.9 °F (36.6 °C) (Oral)   Resp 16   Ht 5' 1\" (1.549 m)   Wt 230 lb (104.3 kg)   SpO2 97%   BMI 43.46 kg/m²     Physical Exam  Vitals signs and nursing note reviewed.

## 2020-10-17 LAB
EKG ATRIAL RATE: 72 BPM
EKG DIAGNOSIS: NORMAL
EKG P AXIS: 40 DEGREES
EKG P-R INTERVAL: 158 MS
EKG Q-T INTERVAL: 406 MS
EKG QRS DURATION: 84 MS
EKG QTC CALCULATION (BAZETT): 444 MS
EKG R AXIS: 17 DEGREES
EKG T AXIS: 25 DEGREES
EKG VENTRICULAR RATE: 72 BPM

## 2021-01-03 ENCOUNTER — APPOINTMENT (OUTPATIENT)
Dept: GENERAL RADIOLOGY | Age: 46
End: 2021-01-03
Payer: COMMERCIAL

## 2021-01-03 ENCOUNTER — HOSPITAL ENCOUNTER (EMERGENCY)
Age: 46
Discharge: HOME | End: 2021-01-03
Attending: EMERGENCY MEDICINE
Payer: COMMERCIAL

## 2021-01-03 VITALS
SYSTOLIC BLOOD PRESSURE: 158 MMHG | OXYGEN SATURATION: 98 % | HEART RATE: 104 BPM | WEIGHT: 240 LBS | RESPIRATION RATE: 18 BRPM | BODY MASS INDEX: 47.12 KG/M2 | TEMPERATURE: 98.1 F | DIASTOLIC BLOOD PRESSURE: 107 MMHG | HEIGHT: 60 IN

## 2021-01-03 DIAGNOSIS — S49.92XA SHOULDER INJURY, LEFT, INITIAL ENCOUNTER: Primary | ICD-10-CM

## 2021-01-03 PROCEDURE — 73030 X-RAY EXAM OF SHOULDER: CPT

## 2021-01-03 PROCEDURE — 99284 EMERGENCY DEPT VISIT MOD MDM: CPT

## 2021-01-03 PROCEDURE — 6370000000 HC RX 637 (ALT 250 FOR IP): Performed by: EMERGENCY MEDICINE

## 2021-01-03 RX ORDER — ACETAMINOPHEN 500 MG
1000 TABLET ORAL ONCE
Status: COMPLETED | OUTPATIENT
Start: 2021-01-03 | End: 2021-01-03

## 2021-01-03 RX ADMIN — ACETAMINOPHEN 1000 MG: 500 TABLET ORAL at 15:16

## 2021-01-03 ASSESSMENT — PAIN DESCRIPTION - LOCATION: LOCATION: SHOULDER;WRIST

## 2021-01-03 ASSESSMENT — PAIN SCALES - GENERAL: PAINLEVEL_OUTOF10: 8

## 2021-01-03 ASSESSMENT — PAIN DESCRIPTION - FREQUENCY: FREQUENCY: CONTINUOUS

## 2021-01-03 ASSESSMENT — PAIN DESCRIPTION - DESCRIPTORS: DESCRIPTORS: ACHING;CONSTANT;DISCOMFORT

## 2021-01-03 NOTE — ED PROVIDER NOTES
Emergency Department Encounter    Patient: Arnoldo León  MRN: [id number]  : 1975  Date of Evaluation: 1/3/2021  ED Provider:  Socorro Duran    Triage Chief Complaint:   Shoulder Injury (fall lt shoulder pain)    [place]:  Arnoldo León is a 39 y.o. female that presents with complaint of left shoulder pain after fall. She got tangled in her dog's chain and fell, landing directly on the left shoulder. Did not strike her head. No neck or back pain. No shortness of breath or difficulty breathing. The pain radiates from the top of her shoulder down into the arm but no specific pain at the wrist or elbow. She can feel her hand. She is holding the arm tucked against her because that is more comfortable. Worse with movement. Pain is 8 out of 10, it had just occurred, had not taken anything for the pain. Denies other injury.     ROS - see HPI, below listed is current ROS at time of my eval:  Four systems reviewed and negative except as above      Past Medical History:   Diagnosis Date    Anxiety     Anxiety     Depression     Headache     migraines    Palpitation     Thyroid disease      Past Surgical History:   Procedure Laterality Date    BREAST REDUCTION SURGERY      HYSTERECTOMY      THYROIDECTOMY, PARTIAL      1992    TYMPANOSTOMY TUBE PLACEMENT       Family History   Problem Relation Age of Onset    Cancer Mother     Heart Disease Mother     Cancer Father     Heart Disease Father      Social History     Socioeconomic History    Marital status:      Spouse name: Not on file    Number of children: Not on file    Years of education: Not on file    Highest education level: Not on file   Occupational History    Not on file   Social Needs    Financial resource strain: Not on file    Food insecurity     Worry: Not on file     Inability: Not on file    Transportation needs     Medical: Not on file     Non-medical: Not on file   Tobacco Use    Smoking status: Never Smoker    Smokeless tobacco: Never Used   Substance and Sexual Activity    Alcohol use: No    Drug use: No    Sexual activity: Yes     Partners: Male   Lifestyle    Physical activity     Days per week: Not on file     Minutes per session: Not on file    Stress: Not on file   Relationships    Social connections     Talks on phone: Not on file     Gets together: Not on file     Attends Roman Catholic service: Not on file     Active member of club or organization: Not on file     Attends meetings of clubs or organizations: Not on file     Relationship status: Not on file    Intimate partner violence     Fear of current or ex partner: Not on file     Emotionally abused: Not on file     Physically abused: Not on file     Forced sexual activity: Not on file   Other Topics Concern    Not on file   Social History Narrative    Not on file     No current facility-administered medications for this encounter. Current Outpatient Medications   Medication Sig Dispense Refill    clonazePAM (KLONOPIN) 0.5 MG tablet Take 1 tablet by mouth 2 times daily as needed for Anxiety for up to 3 days. 6 tablet 0    sertraline (ZOLOFT) 100 MG tablet Take 150 mg by mouth daily      acetaminophen (APAP EXTRA STRENGTH) 500 MG tablet Take 1 tablet by mouth every 6 hours as needed for Pain 120 tablet 0     Allergies   Allergen Reactions    Benadryl [Diphenhydramine] Anxiety     Severe anxiety and agitation. States can take it oral but not IV    Nsaids Rash       Nursing Notes Reviewed    Physical Exam:  ED Triage Vitals [01/03/21 1503]   Enc Vitals Group      BP (!) 158/107      Pulse 104      Resp 18      Temp 98.1 °F (36.7 °C)      Temp Source Oral      SpO2 98 %      Weight 240 lb (108.9 kg)      Height 5' (1.524 m)      Head Circumference       Peak Flow       Pain Score       Pain Loc       Pain Edu? Excl. in [facility]? My pulse ox interpretation is - normal    General appearance:  No acute distress. Skin:  Warm. Dry.    Eye:  Extraocular movements intact. Ears, nose, mouth and throat:  Oral mucosa moist   Neck:  Trachea midline. No C spine tenderness to palpation. Extremity:  No swelling. Holding arm against her body flexed 90 degrees at elbow. Sensation intact in left hand/fingers, nontender to palpation over the left wrist, forearm and elbow. + tender over lateral shoulder joint without obvious deformity, no swelling or bruising or laceration/abrasions noted. nontender over the clavicle. No scapular tenderness to palpation. Heart:  Regular rate and rhythm  Perfusion:  intact  Respiratory:  Respirations nonlabored. Abdominal:   Non distended. Neurological:  Alert and oriented            Psychiatric:  Appropriate    I have reviewed and interpreted all of the currently available lab results from this visit (if applicable):  No results found for this visit on 01/03/21. Radiographs (if obtained):  Radiologist's Report Reviewed:  No results found. EKG (if obtained): (All EKG's are interpreted by myself in the absence of a cardiologist)      MDM:  51-year-old female with history as above presents with left shoulder pain after fall. No obvious deformity or sulcus sign, no swelling. No tenderness anywhere except the lateral shoulder on exam.  X-ray shows no evidence of fracture, advised Tylenol and ice, compression, gentle stretching and movement, rest.  Follow-up with primary care as needed. Would not recommend a sling as do not want to have issues with immobility or a frozen shoulder. Clinical Impression:  1.  Shoulder injury, left, initial encounter      Disposition referral (if applicable):  your doctor          Disposition medications (if applicable):  Discharge Medication List as of 1/3/2021  3:46 PM        ED Provider Disposition Time  DISPOSITION Decision To Discharge 01/03/2021 03:43:49 PM      Comment: Please note this report has been produced using speech recognition software and may contain errors related to that

## 2021-02-04 ENCOUNTER — HOSPITAL ENCOUNTER (EMERGENCY)
Age: 46
Discharge: HOME | End: 2021-02-04
Attending: EMERGENCY MEDICINE
Payer: COMMERCIAL

## 2021-02-04 ENCOUNTER — APPOINTMENT (OUTPATIENT)
Dept: GENERAL RADIOLOGY | Age: 46
End: 2021-02-04
Payer: COMMERCIAL

## 2021-02-04 VITALS
SYSTOLIC BLOOD PRESSURE: 124 MMHG | WEIGHT: 239 LBS | DIASTOLIC BLOOD PRESSURE: 67 MMHG | HEART RATE: 82 BPM | RESPIRATION RATE: 18 BRPM | OXYGEN SATURATION: 100 % | BODY MASS INDEX: 46.92 KG/M2 | HEIGHT: 60 IN | TEMPERATURE: 97.9 F

## 2021-02-04 DIAGNOSIS — S60.222A CONTUSION OF LEFT HAND, INITIAL ENCOUNTER: Primary | ICD-10-CM

## 2021-02-04 DIAGNOSIS — S63.502A SPRAIN OF LEFT WRIST, INITIAL ENCOUNTER: ICD-10-CM

## 2021-02-04 PROCEDURE — 99284 EMERGENCY DEPT VISIT MOD MDM: CPT

## 2021-02-04 PROCEDURE — 73110 X-RAY EXAM OF WRIST: CPT

## 2021-02-04 PROCEDURE — 73130 X-RAY EXAM OF HAND: CPT

## 2021-02-04 ASSESSMENT — PAIN DESCRIPTION - DESCRIPTORS: DESCRIPTORS: THROBBING

## 2021-02-04 ASSESSMENT — PAIN DESCRIPTION - PAIN TYPE: TYPE: ACUTE PAIN

## 2021-02-04 NOTE — ED PROVIDER NOTES
Emergency Department Encounter    Patient: Rupesh June  MRN: [id number]  : 1975  Date of Evaluation: 2021  ED Provider:  Angela Garcia    Triage Chief Complaint:   Fall (Pt arrives ambulatory stating yesterday at 1400 pm was outside snowblowing and fell, bent left thumb and wrist backwards. Pt states pain is not getting any better, cant move left thumb), Wrist Injury, and Hand Injury    [place]:  Rupesh June is a 42 y.o. female that presents presents to the emergency department status post fall. Patient reports that yesterday at approximately 1400 she was snowblowing when she lost balance falling on her outstretched left hand. Reports left thumb pain, left wrist pain and left hand pain. No radiation of pain. Pain is exacerbated by movement. Reports that she has been resting and icing as often as possible. Patient denies any other injuries from fall. She denies hitting her head. No neck pain. No loss of consciousness. She denies feeling lightheaded or dizzy prior to fall.     ROS - see HPI, below listed is current ROS at time of my eval:  General:  No fevers  Musculoskeletal:  No muscle pain, no joint pain  Skin:  No rash, no pruritis, no easy bruising  Neurologic:   no extremity numbness, no extremity tingling, no extremity weakness  Extremities:  no edema, no pain    Past Medical History:   Diagnosis Date    Anxiety     Anxiety     Depression     Headache     migraines    Palpitation     Thyroid disease      Past Surgical History:   Procedure Laterality Date    BREAST REDUCTION SURGERY      HYSTERECTOMY      THYROIDECTOMY, PARTIAL      1992    TYMPANOSTOMY TUBE PLACEMENT       Family History   Problem Relation Age of Onset    Cancer Mother     Heart Disease Mother     Cancer Father     Heart Disease Father      Social History     Socioeconomic History    Marital status:      Spouse name: Not on file    Number of children: Not on file    Years of education: Not on file   Polly Cushing Highest education level: Not on file   Occupational History    Not on file   Social Needs    Financial resource strain: Not on file    Food insecurity     Worry: Not on file     Inability: Not on file    Transportation needs     Medical: Not on file     Non-medical: Not on file   Tobacco Use    Smoking status: Never Smoker    Smokeless tobacco: Never Used   Substance and Sexual Activity    Alcohol use: No    Drug use: No    Sexual activity: Yes     Partners: Male   Lifestyle    Physical activity     Days per week: Not on file     Minutes per session: Not on file    Stress: Not on file   Relationships    Social connections     Talks on phone: Not on file     Gets together: Not on file     Attends Hoahaoism service: Not on file     Active member of club or organization: Not on file     Attends meetings of clubs or organizations: Not on file     Relationship status: Not on file    Intimate partner violence     Fear of current or ex partner: Not on file     Emotionally abused: Not on file     Physically abused: Not on file     Forced sexual activity: Not on file   Other Topics Concern    Not on file   Social History Narrative    Not on file     No current facility-administered medications for this encounter. Current Outpatient Medications   Medication Sig Dispense Refill    clonazePAM (KLONOPIN) 0.5 MG tablet Take 1 tablet by mouth 2 times daily as needed for Anxiety for up to 3 days. 6 tablet 0    sertraline (ZOLOFT) 100 MG tablet Take 150 mg by mouth daily      acetaminophen (APAP EXTRA STRENGTH) 500 MG tablet Take 1 tablet by mouth every 6 hours as needed for Pain 120 tablet 0     Allergies   Allergen Reactions    Benadryl [Diphenhydramine] Anxiety     Severe anxiety and agitation.  States can take it oral but not IV    Nsaids Rash       Nursing Notes Reviewed    Physical Exam:  Triage VS:    ED Triage Vitals [02/04/21 3263]   Enc Vitals Group      /67      Pulse 82      Resp 18 Temp 97.9 °F (36.6 °C)      Temp Source Oral      SpO2 100 %      Weight 239 lb (108.4 kg)      Height 5' (1.524 m)      Head Circumference       Peak Flow       Pain Score       Pain Loc       Pain Edu? Excl. in [address]? General appearance:  No acute distress. Skin:  Warm. Dry. Ears, nose, mouth and throat:  Oral mucosa moist   Extremity: Left wrist: No swelling. No deformity. Tenderness over distal radius, anatomical snuffbox and first metacarpal.  Range of motion was limited secondary to pain. Patient was able to extend and flex at all fingers as well as abduct and abduct. She had normal wrist flexion and extension along with AB and adduction. Perfusion:  intact          Neurological:  Alert and oriented times 3. Motor and sensory exam intact to affected extremity    I have reviewed and interpreted all of the currently available lab results from this visit (if applicable):  No results found for this visit on 02/04/21. Radiographs (if obtained):  XR WRIST LEFT (MIN 3 VIEWS)   Final Result   1. No acute osseous abnormality of the left wrist or left hand identified. XR HAND LEFT (MIN 3 VIEWS)   Final Result   1. No acute osseous abnormality of the left wrist or left hand identified. MDM:  Patient presented with left wrist and hand pain. Based on patient's presentation, history and physical exam presentation appears most consistent with a contusion and wrist sprain. Imaging does show a questionable distal radial fracture nondisplaced per my read; however, I could be overreading this. Radiologist did not feel there is any acute osseous abnormality of the left wrist or hand. I did recommend repeat imaging in 7 to 10 days by her primary care physician. She was placed in a thumb spica; this was both due to possible distal radial fracture and tenderness over the anatomical snuffbox. Patient acknowledged understanding and agreement with plan of care.   She was discharged in good condition with stable vitals. Patient was discharged in good condition with stable vitals. Clinical Impression:  1. Contusion of left hand, initial encounter    2. Sprain of left wrist, initial encounter      Disposition referral (if applicable):  Elena Gonsalez, [address] [facility]  [phone]    Call   Please call your primary care physician for close follow-up; I recommend repeat imaging in 7 to 10 days. Disposition medications (if applicable):  Discharge Medication List as of 2/4/2021  7:42 PM        ED Provider Disposition Time  DISPOSITION Decision To Discharge 02/04/2021 07:26:47 PM    You were evaluated for left wrist and left hand/thumb pain in the emergency department. Imaging did not reveal any obvious fracture or dislocation. I would recommend that you have your distal radius reevaluated with imaging. Please use wrist splint provided. Keep splint clean and dry. You may remove splint to do range of motion exercises as discussed during your ED evaluation. Due to location of injury and possibility of occult fracture I do recommend repeat imaging in 7 to 10 days; this can be accomplished by her primary care physician. If you have worsening/severe pain, erythema, swelling or any medical concerns please return to the emergency department. Comment: Please note this report has been produced using speech recognition software and may contain errors related to that system including errors in grammar, punctuation, and spelling, as well as words and phrases that may be inappropriate. If there are any questions or concerns please feel free to contact the dictating provider for clarification.         Costa Pierre MD  02/05/21 3075

## 2021-04-04 PROBLEM — I83.891 VARICOSE VEINS OF RIGHT LOWER EXTREMITY WITH COMPLICATIONS: Status: ACTIVE | Noted: 2021-04-04

## 2021-05-19 DIAGNOSIS — F41.9 ANXIETY: Primary | ICD-10-CM

## 2021-05-19 RX ORDER — DIAZEPAM 5 MG/1
5 TABLET ORAL EVERY 8 HOURS PRN
Qty: 3 TABLET | Refills: 0 | Status: SHIPPED | OUTPATIENT
Start: 2021-05-19 | End: 2021-05-29

## 2021-05-26 DIAGNOSIS — G89.18 POST-OPERATIVE PAIN: Primary | ICD-10-CM

## 2021-05-26 RX ORDER — OXYCODONE HYDROCHLORIDE AND ACETAMINOPHEN 5; 325 MG/1; MG/1
1 TABLET ORAL EVERY 6 HOURS PRN
Qty: 25 TABLET | Refills: 0 | Status: SHIPPED | OUTPATIENT
Start: 2021-05-26 | End: 2021-06-02

## 2021-06-21 PROBLEM — Z98.890 STATUS POST PHLEBECTOMY: Status: ACTIVE | Noted: 2021-06-21

## 2021-07-24 ENCOUNTER — HOSPITAL ENCOUNTER (EMERGENCY)
Age: 46
Discharge: HOME | End: 2021-07-24
Attending: EMERGENCY MEDICINE
Payer: COMMERCIAL

## 2021-07-24 VITALS
TEMPERATURE: 98.2 F | WEIGHT: 238.38 LBS | OXYGEN SATURATION: 98 % | HEART RATE: 76 BPM | DIASTOLIC BLOOD PRESSURE: 70 MMHG | RESPIRATION RATE: 16 BRPM | HEIGHT: 60 IN | BODY MASS INDEX: 46.8 KG/M2 | SYSTOLIC BLOOD PRESSURE: 119 MMHG

## 2021-07-24 DIAGNOSIS — M54.2 NECK PAIN: Primary | ICD-10-CM

## 2021-07-24 PROCEDURE — 99284 EMERGENCY DEPT VISIT MOD MDM: CPT

## 2021-07-24 RX ORDER — LIDOCAINE 50 MG/G
1 PATCH TOPICAL DAILY
Qty: 10 PATCH | Refills: 0 | Status: SHIPPED | OUTPATIENT
Start: 2021-07-24 | End: 2021-08-12

## 2021-07-24 RX ORDER — CYCLOBENZAPRINE HCL 10 MG
10 TABLET ORAL 3 TIMES DAILY PRN
Qty: 21 TABLET | Refills: 0 | Status: SHIPPED | OUTPATIENT
Start: 2021-07-24 | End: 2021-08-03

## 2021-07-24 RX ORDER — TIZANIDINE 2 MG/1
2 TABLET ORAL EVERY 6 HOURS PRN
COMMUNITY
End: 2021-09-02 | Stop reason: ALTCHOICE

## 2021-07-24 ASSESSMENT — PAIN DESCRIPTION - FREQUENCY: FREQUENCY: CONTINUOUS

## 2021-07-24 ASSESSMENT — PAIN DESCRIPTION - LOCATION: LOCATION: NECK

## 2021-07-24 ASSESSMENT — PAIN SCALES - GENERAL: PAINLEVEL_OUTOF10: 7

## 2021-07-24 ASSESSMENT — PAIN DESCRIPTION - DESCRIPTORS: DESCRIPTORS: ACHING

## 2021-07-24 ASSESSMENT — PAIN DESCRIPTION - PAIN TYPE: TYPE: ACUTE PAIN

## 2021-07-24 NOTE — ED PROVIDER NOTES
eMERGENCY dEPARTMENT eNCOUnter      PCP: Korey Swann, [address] [facility]    Chief Complaint   Patient presents with    Neck Pain     x several days. No injury. HPI    Obie Mcclellan is a 42 y.o. female who presents with neck pain. States that a few days of neck discomfort. Denies any known injury. No chiropractic manipulation. No recent trauma. States just woke up with a tight feeling in the neck, pressure-like sensation which is been ongoing in the lower aspect of the neck for several days. States worse when she moves around. States she tried some Tylenol and some ibuprofen that did not seem to help. No new weakness, numbness or tingling. No vision change. No chest pain, palpitations or shortness of breath. No recent illness, fever, chills, cough or congestion. REVIEW OF SYSTEMS    Constitutional:  Denies fever, chills  Cardiovascular:  Denies chest pain, palpitations or swelling  Respiratory:  Denies cough or shortness of breath    GI:  Denies abdominal pain, nausea, vomiting, or diarrhea  :  Denies any urinary symptoms. Musculoskeletal:  See HPI above   Skin:  Denies rash  Neurologic: Denies weakness, numbness.   Endocrine:  Denies polyuria or polydypsia   Lymphatic:  Denies swollen glands     All other review of systems are negative  See HPI and nursing notes for additional information       PAST MEDICAL & SURGICAL HISTORY    Past Medical History:   Diagnosis Date    Anxiety     Anxiety     Depression     Headache     migraines    Palpitation     Thyroid disease      Past Surgical History:   Procedure Laterality Date    BREAST REDUCTION SURGERY      HYSTERECTOMY      THYROIDECTOMY, PARTIAL      1992    TYMPANOSTOMY TUBE PLACEMENT         CURRENT MEDICATIONS    Current Outpatient Rx   Medication Sig Dispense Refill    tiZANidine (ZANAFLEX) 2 MG tablet Take 2 mg by mouth every 6 hours as needed      atorvastatin (LIPITOR) 20 MG tablet       omeprazole (PRILOSEC) 40 MG delayed release capsule Take 40 mg by mouth daily      escitalopram (LEXAPRO) 20 MG tablet Take 20 mg by mouth daily      clonazePAM (KLONOPIN) 0.5 MG tablet Take 1 tablet by mouth 2 times daily as needed for Anxiety for up to 3 days. 6 tablet 0    acetaminophen (APAP EXTRA STRENGTH) 500 MG tablet Take 1 tablet by mouth every 6 hours as needed for Pain 120 tablet 0       ALLERGIES    Allergies   Allergen Reactions    Benadryl [Diphenhydramine] Anxiety     Severe anxiety and agitation. States can take it oral but not IV    Nsaids Rash       SOCIAL HISTORY    Social History     Socioeconomic History    Marital status:      Spouse name: None    Number of children: None    Years of education: None    Highest education level: None   Occupational History    None   Tobacco Use    Smoking status: Never Smoker    Smokeless tobacco: Never Used   Vaping Use    Vaping Use: Never used   Substance and Sexual Activity    Alcohol use: No    Drug use: No    Sexual activity: Yes     Partners: Male   Other Topics Concern    None   Social History Narrative    None     Social Determinants of Health     Financial Resource Strain:     Difficulty of Paying Living Expenses:    Food Insecurity:     Worried About Running Out of Food in the Last Year:     Ran Out of Food in the Last Year:    Transportation Needs:     Lack of Transportation (Medical):      Lack of Transportation (Non-Medical):    Physical Activity:     Days of Exercise per Week:     Minutes of Exercise per Session:    Stress:     Feeling of Stress :    Social Connections:     Frequency of Communication with Friends and Family:     Frequency of Social Gatherings with Friends and Family:     Attends [employer] Services:     Active Member of Clubs or Organizations:     Attends Club or Organization Meetings:     Marital Status:    Intimate Partner Violence:     Fear of Current or Ex-Partner:     Emotionally Abused:     Physically Abused:     Sexually Abused:        PHYSICAL EXAM    VITAL SIGNS: /70   Pulse 76   Temp 98.2 °F (36.8 °C) (Oral)   Resp 16   Ht 5' (1.524 m)   Wt 238 lb 6 oz (108.1 kg)   SpO2 98%   BMI 46.55 kg/m²        Constitutional:  Awake, alert, in no acute distress. HENT:  Atraumatic,  Moist mucus membranes. Normal posterior pharynx. Eyes:  Pupils equal round and reactive to light, EOM intact. Neck: Tenderness to palpation of the paraspinal musculature of the lower cervical spine, upper thoracic spine. Range of motion is intact, mildly limited due to pain. Cardiovascular:  Regular rate and rhythm, no murmurs/rubs/gallops  Respiratory:  No respiratory distress, clear to auscultation bilaterally. Abdomen:  Soft, non tender, bowel sounds present  Musculoskeletal:  No edema, no deformities. Pulses in bilateral upper extremities are intact. Tenderness to palpation of the paraspinal musculature of the upper thoracic spine, extending into the rhomboid musculature bilaterally. No tenderness of shoulder joints. Integument:  Well hydrated, no rash, no pallor. Neurologic:  Alert & oriented. Vision is intact. Pupils are equal, round and reactive to light bilaterally, EOM are intact, face is symmetrical, tongue is midline. Motor function and sensation are grossly intact. There is no pronator drift. Finger to nose is normal. No trunchal ataxia. Speech is normal. Able to ambulate without difficulty. Vascular:  Distal pulses and capillary refill intact bilateral lower extremities            ED COURSE & MEDICAL DECISION MAKING       Vital signs and nursing notes reviewed during ED course. I have independently evaluated this patient. All pertinent Lab data and radiographic results reviewed with patient at bedside. The patient and/or the family were informed of the results of any tests/labs/imaging, the treatment plan, and time was allotted to answer questions. 61-year-old female presenting with neck pain.   Has tenderness of the paraspinal musculature of the lower cervical spine, upper thoracic spine and rhomboid muscles. Neurovascularly intact exam.  No concerning neurologic symptoms. Likely musculoskeletal in nature. Will treat symptomatically at this point, continue ibuprofen, muscle relaxant, Lidoderm patches as needed. We will plan for discharge home with outpatient follow-up with primary care provider in the next several days for recheck. Instructed return with new or worsening signs or symptoms. Clinical  IMPRESSION    Neck pain      Diagnosis and plan discussed in detail with patient who understands and agrees. Patient agrees to return emergency department if symptoms worsen or any new symptoms develop.       (Please note the MDM and HPI sections of this note were completed with a voice recognition program.  Efforts were made to edit the dictations but occasionally words are mis-transcribed.)      Jovan Wang DO  07/24/21 3212

## 2021-08-12 ENCOUNTER — APPOINTMENT (OUTPATIENT)
Dept: CT IMAGING | Age: 46
End: 2021-08-12
Payer: COMMERCIAL

## 2021-08-12 ENCOUNTER — HOSPITAL ENCOUNTER (EMERGENCY)
Age: 46
Discharge: HOME | End: 2021-08-12
Attending: STUDENT IN AN ORGANIZED HEALTH CARE EDUCATION/TRAINING PROGRAM
Payer: COMMERCIAL

## 2021-08-12 VITALS
WEIGHT: 240 LBS | RESPIRATION RATE: 20 BRPM | OXYGEN SATURATION: 98 % | TEMPERATURE: 98.5 F | DIASTOLIC BLOOD PRESSURE: 95 MMHG | HEIGHT: 60 IN | HEART RATE: 99 BPM | SYSTOLIC BLOOD PRESSURE: 130 MMHG | BODY MASS INDEX: 47.12 KG/M2

## 2021-08-12 DIAGNOSIS — M54.2 NECK PAIN: Primary | ICD-10-CM

## 2021-08-12 PROCEDURE — 72125 CT NECK SPINE W/O DYE: CPT

## 2021-08-12 PROCEDURE — 94761 N-INVAS EAR/PLS OXIMETRY MLT: CPT

## 2021-08-12 PROCEDURE — 72128 CT CHEST SPINE W/O DYE: CPT

## 2021-08-12 PROCEDURE — 70450 CT HEAD/BRAIN W/O DYE: CPT

## 2021-08-12 PROCEDURE — 93005 ELECTROCARDIOGRAM TRACING: CPT | Performed by: STUDENT IN AN ORGANIZED HEALTH CARE EDUCATION/TRAINING PROGRAM

## 2021-08-12 PROCEDURE — 99283 EMERGENCY DEPT VISIT LOW MDM: CPT

## 2021-08-12 RX ORDER — METHOCARBAMOL 500 MG/1
750 TABLET, FILM COATED ORAL 4 TIMES DAILY
Status: DISCONTINUED | OUTPATIENT
Start: 2021-08-12 | End: 2021-08-12 | Stop reason: HOSPADM

## 2021-08-12 RX ORDER — LIDOCAINE 4 G/G
1 PATCH TOPICAL DAILY
Status: DISCONTINUED | OUTPATIENT
Start: 2021-08-12 | End: 2021-08-12 | Stop reason: HOSPADM

## 2021-08-12 RX ORDER — ACETAMINOPHEN 325 MG/1
650 TABLET ORAL EVERY 4 HOURS PRN
Qty: 120 TABLET | Refills: 3 | Status: SHIPPED | OUTPATIENT
Start: 2021-08-12 | End: 2021-11-28 | Stop reason: ALTCHOICE

## 2021-08-12 RX ORDER — LIDOCAINE 4 G/G
1 PATCH TOPICAL DAILY
Qty: 30 PATCH | Refills: 0 | Status: SHIPPED | OUTPATIENT
Start: 2021-08-12 | End: 2021-09-02 | Stop reason: ALTCHOICE

## 2021-08-12 RX ORDER — METHOCARBAMOL 500 MG/1
750 TABLET, FILM COATED ORAL 4 TIMES DAILY
Qty: 60 TABLET | Refills: 0 | Status: SHIPPED | OUTPATIENT
Start: 2021-08-12 | End: 2021-08-22

## 2021-08-12 RX ORDER — ACETAMINOPHEN 325 MG/1
650 TABLET ORAL ONCE
Status: DISCONTINUED | OUTPATIENT
Start: 2021-08-12 | End: 2021-08-12 | Stop reason: HOSPADM

## 2021-08-12 ASSESSMENT — PAIN DESCRIPTION - PAIN TYPE: TYPE: ACUTE PAIN

## 2021-08-12 ASSESSMENT — PAIN SCALES - GENERAL: PAINLEVEL_OUTOF10: 10

## 2021-08-12 ASSESSMENT — PAIN DESCRIPTION - LOCATION: LOCATION: ARM

## 2021-08-12 ASSESSMENT — PAIN DESCRIPTION - DIRECTION: RADIATING_TOWARDS: NECK AND BACK

## 2021-08-12 ASSESSMENT — PAIN DESCRIPTION - ORIENTATION: ORIENTATION: LEFT

## 2021-08-13 NOTE — ED PROVIDER NOTES
Emergency Department Encounter    Patient: Baldev Sullivan  MRN: [id number]  : 1975  Date of Evaluation: 2021  ED Provider:  Dann Sams MD    Triage Chief Complaint:   Arm Pain (left arm pain for 3 days; reports that the pain radiates down her neck and back)    [place]:  Baldev Sullivan is a 42 y.o. female presenting with neck pain. Patient states over the past 3 days she has had neck pain, constant, stabbing, severe, worse with palpation without relieving factors. Patient denies headache, blurred vision, focal neuro deficits, motor or sensory changes. Denies falls or trauma. States she has never had pain similar to this in the past.  Denies lightheadedness or dizziness. States the pain was slow in onset and did not have an abrupt onset. Denies fevers or chills or infectious symptoms. Denies neck stiffness. Denies pain in her lower back but states the pain is in her upper T-spine lower C-spine. States the pain is recreated by point tenderness along the spine. Denies drug use, motor or sensory changes, change in bowel or bladder, alcohol abuse, diabetes, renal failure, recent or current systemic infection, immunocompromise state, recent spinal fracture procedure, incontinence or retention. Denies any other symptoms like chest pain, shortness of breath, cough or sputum production, abdominal pain, change in urination, change in bowel habits.    Denies any motor or sensory changes in her upper extremities    ROS - see HPI, below listed is current ROS at time of my eval:  General:  No fevers, no chills, no weakness  Eyes:  No recent vison changes, no discharge  ENT:  No sore throat, no nasal congestion, no hearing changes  Cardiovascular:  No chest pain, no palpitations  Respiratory:  No shortness of breath, no cough, no wheezing  Gastrointestinal:  No pain, no nausea, no vomiting, no diarrhea  Musculoskeletal:  Pain in neck, No muscle pain, no joint pain  Skin:  No rash, no pruritis, no easy bruising  Neurologic:  No speech problems, no headache, no extremity numbness, no extremity tingling, no extremity weakness  Psychiatric:  No anxiety  Genitourinary:  No dysuria, no hematuria  Endocrine:  No unexpected weight gain, no unexpected weight loss  Extremities:  no edema, no pain    Past Medical History:   Diagnosis Date    Anxiety     Anxiety     Depression     Headache     migraines    Palpitation     Thyroid disease      Past Surgical History:   Procedure Laterality Date    BREAST REDUCTION SURGERY      HYSTERECTOMY      THYROIDECTOMY, PARTIAL      1992    TYMPANOSTOMY TUBE PLACEMENT       Family History   Problem Relation Age of Onset    Cancer Mother     Heart Disease Mother     Cancer Father     Heart Disease Father      Social History     Socioeconomic History    Marital status:      Spouse name: Not on file    Number of children: Not on file    Years of education: Not on file    Highest education level: Not on file   Occupational History    Not on file   Tobacco Use    Smoking status: Never Smoker    Smokeless tobacco: Never Used   Vaping Use    Vaping Use: Never used   Substance and Sexual Activity    Alcohol use: No    Drug use: No    Sexual activity: Yes     Partners: Male   Other Topics Concern    Not on file   Social History Narrative    Not on file     Social Determinants of Health     Financial Resource Strain:     Difficulty of Paying Living Expenses:    Food Insecurity:     Worried About Running Out of Food in the Last Year:     Ran Out of Food in the Last Year:    Transportation Needs:     Lack of Transportation (Medical):      Lack of Transportation (Non-Medical):    Physical Activity:     Days of Exercise per Week:     Minutes of Exercise per Session:    Stress:     Feeling of Stress :    Social Connections:     Frequency of Communication with Friends and Family:     Frequency of Social Gatherings with Friends and Family:     Attends Oriental orthodox Services:     Active Member of Clubs or Organizations:     Attends Club or Organization Meetings:     Marital Status:    Intimate Partner Violence:     Fear of Current or Ex-Partner:     Emotionally Abused:     Physically Abused:     Sexually Abused:      No current facility-administered medications for this encounter. Current Outpatient Medications   Medication Sig Dispense Refill    acetaminophen (AMINOFEN) 325 MG tablet Take 2 tablets by mouth every 4 hours as needed for Pain 120 tablet 3    methocarbamol (ROBAXIN) 500 MG tablet Take 1.5 tablets by mouth 4 times daily for 10 days 60 tablet 0    lidocaine 4 % external patch Place 1 patch onto the skin daily 30 patch 0    tiZANidine (ZANAFLEX) 2 MG tablet Take 2 mg by mouth every 6 hours as needed      atorvastatin (LIPITOR) 20 MG tablet       omeprazole (PRILOSEC) 40 MG delayed release capsule Take 40 mg by mouth daily      escitalopram (LEXAPRO) 20 MG tablet Take 20 mg by mouth daily      clonazePAM (KLONOPIN) 0.5 MG tablet Take 1 tablet by mouth 2 times daily as needed for Anxiety for up to 3 days. 6 tablet 0     Allergies   Allergen Reactions    Benadryl [Diphenhydramine] Anxiety     Severe anxiety and agitation. States can take it oral but not IV    Nsaids Rash       Nursing Notes Reviewed    Physical Exam:  Triage VS:    ED Triage Vitals [08/12/21 1246]   Enc Vitals Group      /64      Pulse 92      Resp 20      Temp 98.5 °F (36.9 °C)      Temp Source Oral      SpO2 98 %      Weight 240 lb (108.9 kg)      Height 5' (1.524 m)      Head Circumference       Peak Flow       Pain Score       Pain Loc       Pain Edu? Excl. in [address]? My pulse ox interpretation is - normal    General appearance:  No acute distress. Skin:  Warm. Dry. Eye:  Extraocular movements intact. Ears, nose, mouth and throat:  Oral mucosa moist   Neck:  Trachea midline. Extremity:  No swelling.   Normal ROM     Heart:  Regular rate and rhythm, normal S1 & S2, no extra heart sounds. Perfusion:  intact  Respiratory:  Lungs clear to auscultation bilaterally. Respirations nonlabored. Abdominal:  Normal bowel sounds. Soft. Nontender. Non distended. Back:  No CVA tenderness to palpation   point tenderness palpation that recreates the pain along the central lower C-spine and upper T-spine  Neurological:  Alert and oriented times 3. No focal neuro deficits. ,  Cranial nerves II through XII grossly intact, 5 out of 5 motor strength in bilateral upper and lower extremities, normal sensation light touch, normal finger-nose-finger, normal gait without ataxia, pupils are 4 mm reactive bilaterally          Psychiatric:  Appropriate    I have reviewed and interpreted all of the currently available lab results from this visit (if applicable):  No results found for this visit on 08/12/21. Radiographs (if obtained):  Radiologist's Report Reviewed:  CT HEAD WO CONTRAST    Result Date: 8/12/2021  EXAMINATION: CT OF THE HEAD WITHOUT CONTRAST; CT OF THE THORACIC SPINE WITHOUT CONTRAST; CT OF THE CERVICAL SPINE WITHOUT CONTRAST  8/12/2021 5:30 pm TECHNIQUE: CT of the head was performed without the administration of intravenous contrast. Dose modulation, iterative reconstruction, and/or weight based adjustment of the mA/kV was utilized to reduce the radiation dose to as low as reasonably achievable.; CT of the thoracic spine was performed without the administration of intravenous contrast. Multiplanar reformatted images are provided for review. Dose modulation, iterative reconstruction, and/or weight based adjustment of the mA/kV was utilized to reduce the radiation dose to as low as reasonably achievable.; CT of the cervical spine was performed without the administration of intravenous contrast. Multiplanar reformatted images are provided for review.  Dose modulation, iterative reconstruction, and/or weight based adjustment of the mA/kV was utilized to reduce the radiation dose to as low as reasonably achievable. COMPARISON: CT of the cervical spine of 08/14/2014. CT of the head of 10/31/2016. HISTORY: ORDERING SYSTEM PROVIDED HISTORY: occipital headache TECHNOLOGIST PROVIDED HISTORY: Reason for exam:->occipital headache Has a \"code stroke\" or \"stroke alert\" been called? ->No Decision Support Exception - unselect if not a suspected or confirmed emergency medical condition->Emergency Medical Condition (MA) Is the patient pregnant?->No Reason for Exam: occipital headache Acuity: Acute Type of Exam: Initial Additional signs and symptoms: none Relevant Medical/Surgical History: no FINDINGS: Head: BRAIN/VENTRICLES: There is no acute intracranial hemorrhage, mass effect or midline shift. No abnormal extra-axial fluid collection. The gray-white differentiation is maintained without evidence of an acute infarct. There is no evidence of hydrocephalus. Overall similar appearance of enlarged, empty sella turcica. ORBITS: The visualized portion of the orbits demonstrate no acute abnormality. SINUSES: The visualized paranasal sinuses and mastoid air cells demonstrate no acute abnormality. SOFT TISSUES/SKULL:  No acute abnormality of the visualized skull or soft tissues. Cervical spine: Bone/alignment: No acute fracture or traumatic malalignment of the cervical spine. Straightening of cervical lordosis. No spondylolisthesis. Facet joints are anatomically aligned. Soft tissues: No prevertebral soft tissue swelling. Degenerative changes: No significant degenerative changes. Intervertebral disc heights are preserved. Thoracic spine: Bone/alignment: No fracture or traumatic malalignment. Anatomic alignment of the thoracic spine. No spondylolisthesis. Facet joints are anatomically aligned. Soft tissues: No soft tissue abnormality. Degenerative changes: Mild degenerative disc disease within the thoracic spine.  Bilateral pars interarticularis defects at L2, unchanged in appearance in comparison to 09/28/2019. No acute intracranial abnormality. No acute fracture or traumatic malalignment of the cervical or thoracic spine. Mild degenerative disc disease within the thoracic spine. Bilateral L2 spondylolysis, unchanged in comparison to 09/28/2019. CT CERVICAL SPINE WO CONTRAST    Result Date: 8/12/2021  EXAMINATION: CT OF THE HEAD WITHOUT CONTRAST; CT OF THE THORACIC SPINE WITHOUT CONTRAST; CT OF THE CERVICAL SPINE WITHOUT CONTRAST  8/12/2021 5:30 pm TECHNIQUE: CT of the head was performed without the administration of intravenous contrast. Dose modulation, iterative reconstruction, and/or weight based adjustment of the mA/kV was utilized to reduce the radiation dose to as low as reasonably achievable.; CT of the thoracic spine was performed without the administration of intravenous contrast. Multiplanar reformatted images are provided for review. Dose modulation, iterative reconstruction, and/or weight based adjustment of the mA/kV was utilized to reduce the radiation dose to as low as reasonably achievable.; CT of the cervical spine was performed without the administration of intravenous contrast. Multiplanar reformatted images are provided for review. Dose modulation, iterative reconstruction, and/or weight based adjustment of the mA/kV was utilized to reduce the radiation dose to as low as reasonably achievable. COMPARISON: CT of the cervical spine of 08/14/2014. CT of the head of 10/31/2016. HISTORY: ORDERING SYSTEM PROVIDED HISTORY: occipital headache TECHNOLOGIST PROVIDED HISTORY: Reason for exam:->occipital headache Has a \"code stroke\" or \"stroke alert\" been called? ->No Decision Support Exception - unselect if not a suspected or confirmed emergency medical condition->Emergency Medical Condition (MA) Is the patient pregnant?->No Reason for Exam: occipital headache Acuity: Acute Type of Exam: Initial Additional signs and symptoms: none Relevant Medical/Surgical History: no FINDINGS: Head: BRAIN/VENTRICLES: There is no acute intracranial hemorrhage, mass effect or midline shift. No abnormal extra-axial fluid collection. The gray-white differentiation is maintained without evidence of an acute infarct. There is no evidence of hydrocephalus. Overall similar appearance of enlarged, empty sella turcica. ORBITS: The visualized portion of the orbits demonstrate no acute abnormality. SINUSES: The visualized paranasal sinuses and mastoid air cells demonstrate no acute abnormality. SOFT TISSUES/SKULL:  No acute abnormality of the visualized skull or soft tissues. Cervical spine: Bone/alignment: No acute fracture or traumatic malalignment of the cervical spine. Straightening of cervical lordosis. No spondylolisthesis. Facet joints are anatomically aligned. Soft tissues: No prevertebral soft tissue swelling. Degenerative changes: No significant degenerative changes. Intervertebral disc heights are preserved. Thoracic spine: Bone/alignment: No fracture or traumatic malalignment. Anatomic alignment of the thoracic spine. No spondylolisthesis. Facet joints are anatomically aligned. Soft tissues: No soft tissue abnormality. Degenerative changes: Mild degenerative disc disease within the thoracic spine. Bilateral pars interarticularis defects at L2, unchanged in appearance in comparison to 09/28/2019. No acute intracranial abnormality. No acute fracture or traumatic malalignment of the cervical or thoracic spine. Mild degenerative disc disease within the thoracic spine. Bilateral L2 spondylolysis, unchanged in comparison to 09/28/2019.      CT THORACIC SPINE WO CONTRAST    Result Date: 8/12/2021  EXAMINATION: CT OF THE HEAD WITHOUT CONTRAST; CT OF THE THORACIC SPINE WITHOUT CONTRAST; CT OF THE CERVICAL SPINE WITHOUT CONTRAST  8/12/2021 5:30 pm TECHNIQUE: CT of the head was performed without the administration of intravenous contrast. Dose modulation, iterative reconstruction, and/or weight based adjustment of the mA/kV was utilized to reduce the radiation dose to as low as reasonably achievable.; CT of the thoracic spine was performed without the administration of intravenous contrast. Multiplanar reformatted images are provided for review. Dose modulation, iterative reconstruction, and/or weight based adjustment of the mA/kV was utilized to reduce the radiation dose to as low as reasonably achievable.; CT of the cervical spine was performed without the administration of intravenous contrast. Multiplanar reformatted images are provided for review. Dose modulation, iterative reconstruction, and/or weight based adjustment of the mA/kV was utilized to reduce the radiation dose to as low as reasonably achievable. COMPARISON: CT of the cervical spine of 08/14/2014. CT of the head of 10/31/2016. HISTORY: ORDERING SYSTEM PROVIDED HISTORY: occipital headache TECHNOLOGIST PROVIDED HISTORY: Reason for exam:->occipital headache Has a \"code stroke\" or \"stroke alert\" been called? ->No Decision Support Exception - unselect if not a suspected or confirmed emergency medical condition->Emergency Medical Condition (MA) Is the patient pregnant?->No Reason for Exam: occipital headache Acuity: Acute Type of Exam: Initial Additional signs and symptoms: none Relevant Medical/Surgical History: no FINDINGS: Head: BRAIN/VENTRICLES: There is no acute intracranial hemorrhage, mass effect or midline shift. No abnormal extra-axial fluid collection. The gray-white differentiation is maintained without evidence of an acute infarct. There is no evidence of hydrocephalus. Overall similar appearance of enlarged, empty sella turcica. ORBITS: The visualized portion of the orbits demonstrate no acute abnormality. SINUSES: The visualized paranasal sinuses and mastoid air cells demonstrate no acute abnormality. SOFT TISSUES/SKULL:  No acute abnormality of the visualized skull or soft tissues.  Cervical spine: Bone/alignment: No acute fracture or traumatic malalignment of the cervical spine. Straightening of cervical lordosis. No spondylolisthesis. Facet joints are anatomically aligned. Soft tissues: No prevertebral soft tissue swelling. Degenerative changes: No significant degenerative changes. Intervertebral disc heights are preserved. Thoracic spine: Bone/alignment: No fracture or traumatic malalignment. Anatomic alignment of the thoracic spine. No spondylolisthesis. Facet joints are anatomically aligned. Soft tissues: No soft tissue abnormality. Degenerative changes: Mild degenerative disc disease within the thoracic spine. Bilateral pars interarticularis defects at L2, unchanged in appearance in comparison to 09/28/2019. No acute intracranial abnormality. No acute fracture or traumatic malalignment of the cervical or thoracic spine. Mild degenerative disc disease within the thoracic spine. Bilateral L2 spondylolysis, unchanged in comparison to 09/28/2019. MDM:    79-year-old female presenting with 3 days of neck pain. History and be seen above. Vitals on presentation are reassuring and patient afebrile satting on room air. On physical exam patient has point tenderness centrally along the lower C-spine and upper T-spine which patient states recreates the pain. Patient has no red flags currently. CT head, C-spine and T-spine were obtained and were nonacute. Patient reports allergy to NSAIDs and patient was given Tylenol, Robaxin and lidocaine patch in the ED with some improvement of symptoms. Due to point tenderness palpation of the central bony prominences of the lower C-spine and upper T-spine have very low suspicion for vertebral artery or posterior circulation pathology but did offer CTA to patient. She states that she would like to defer this at this time and will return for any new change or worsening symptoms.   I discussed strict return precautions with patient as well as follow-up with primary care physician patient

## 2021-08-15 LAB
EKG ATRIAL RATE: 94 BPM
EKG DIAGNOSIS: NORMAL
EKG P AXIS: 41 DEGREES
EKG P-R INTERVAL: 152 MS
EKG Q-T INTERVAL: 366 MS
EKG QRS DURATION: 82 MS
EKG QTC CALCULATION (BAZETT): 457 MS
EKG R AXIS: 10 DEGREES
EKG T AXIS: 34 DEGREES
EKG VENTRICULAR RATE: 94 BPM

## 2021-08-15 PROCEDURE — 93010 ELECTROCARDIOGRAM REPORT: CPT | Performed by: INTERNAL MEDICINE

## 2021-09-02 ENCOUNTER — HOSPITAL ENCOUNTER (EMERGENCY)
Age: 46
Discharge: HOME | End: 2021-09-02
Attending: EMERGENCY MEDICINE
Payer: COMMERCIAL

## 2021-09-02 ENCOUNTER — APPOINTMENT (OUTPATIENT)
Dept: GENERAL RADIOLOGY | Age: 46
End: 2021-09-02
Payer: COMMERCIAL

## 2021-09-02 VITALS
SYSTOLIC BLOOD PRESSURE: 136 MMHG | RESPIRATION RATE: 18 BRPM | TEMPERATURE: 98.5 F | HEART RATE: 105 BPM | BODY MASS INDEX: 48.29 KG/M2 | WEIGHT: 246 LBS | OXYGEN SATURATION: 95 % | HEIGHT: 60 IN | DIASTOLIC BLOOD PRESSURE: 85 MMHG

## 2021-09-02 DIAGNOSIS — E86.0 DEHYDRATION: ICD-10-CM

## 2021-09-02 DIAGNOSIS — R51.9 NONINTRACTABLE HEADACHE, UNSPECIFIED CHRONICITY PATTERN, UNSPECIFIED HEADACHE TYPE: ICD-10-CM

## 2021-09-02 DIAGNOSIS — R11.2 NON-INTRACTABLE VOMITING WITH NAUSEA, UNSPECIFIED VOMITING TYPE: Primary | ICD-10-CM

## 2021-09-02 LAB
ANION GAP SERPL CALCULATED.3IONS-SCNC: 11 MMOL/L (ref 4–16)
BASOPHILS ABSOLUTE: 0 K/CU MM
BASOPHILS RELATIVE PERCENT: 0.3 % (ref 0–1)
BUN BLDV-MCNC: 13 MG/DL (ref 6–23)
CALCIUM SERPL-MCNC: 9.1 MG/DL (ref 8.3–10.6)
CHLORIDE BLD-SCNC: 101 MMOL/L (ref 99–110)
CO2: 26 MMOL/L (ref 21–32)
CREAT SERPL-MCNC: 0.7 MG/DL (ref 0.6–1.1)
DIFFERENTIAL TYPE: ABNORMAL
EOSINOPHILS ABSOLUTE: 0.2 K/CU MM
EOSINOPHILS RELATIVE PERCENT: 1.2 % (ref 0–3)
GFR AFRICAN AMERICAN: >60 ML/MIN/1.73M2
GFR NON-AFRICAN AMERICAN: >60 ML/MIN/1.73M2
GLUCOSE BLD-MCNC: 88 MG/DL (ref 70–99)
HCT VFR BLD CALC: 38.9 % (ref 37–47)
HEMOGLOBIN: 12.3 GM/DL (ref 12.5–16)
IMMATURE NEUTROPHIL %: 0.3 % (ref 0–0.43)
LYMPHOCYTES ABSOLUTE: 1.5 K/CU MM
LYMPHOCYTES RELATIVE PERCENT: 10 % (ref 24–44)
MCH RBC QN AUTO: 29.2 PG (ref 27–31)
MCHC RBC AUTO-ENTMCNC: 31.6 % (ref 32–36)
MCV RBC AUTO: 92.4 FL (ref 78–100)
MONOCYTES ABSOLUTE: 0.6 K/CU MM
MONOCYTES RELATIVE PERCENT: 4.1 % (ref 0–4)
PDW BLD-RTO: 13 % (ref 11.7–14.9)
PLATELET # BLD: 290 K/CU MM (ref 140–440)
PMV BLD AUTO: 10.3 FL (ref 7.5–11.1)
POTASSIUM SERPL-SCNC: 3.8 MMOL/L (ref 3.5–5.1)
RBC # BLD: 4.21 M/CU MM (ref 4.2–5.4)
SEGMENTED NEUTROPHILS ABSOLUTE COUNT: 12.4 K/CU MM
SEGMENTED NEUTROPHILS RELATIVE PERCENT: 84.1 % (ref 36–66)
SODIUM BLD-SCNC: 138 MMOL/L (ref 135–145)
TOTAL IMMATURE NEUTOROPHIL: 0.05 K/CU MM
TSH HIGH SENSITIVITY: 1.14 UIU/ML (ref 0.27–4.2)
WBC # BLD: 14.7 K/CU MM (ref 4–10.5)

## 2021-09-02 PROCEDURE — 99283 EMERGENCY DEPT VISIT LOW MDM: CPT

## 2021-09-02 PROCEDURE — 6360000002 HC RX W HCPCS: Performed by: EMERGENCY MEDICINE

## 2021-09-02 PROCEDURE — 96374 THER/PROPH/DIAG INJ IV PUSH: CPT

## 2021-09-02 PROCEDURE — 2580000003 HC RX 258: Performed by: EMERGENCY MEDICINE

## 2021-09-02 PROCEDURE — 96375 TX/PRO/DX INJ NEW DRUG ADDON: CPT

## 2021-09-02 PROCEDURE — U0005 INFEC AGEN DETEC AMPLI PROBE: HCPCS

## 2021-09-02 PROCEDURE — U0003 INFECTIOUS AGENT DETECTION BY NUCLEIC ACID (DNA OR RNA); SEVERE ACUTE RESPIRATORY SYNDROME CORONAVIRUS 2 (SARS-COV-2) (CORONAVIRUS DISEASE [COVID-19]), AMPLIFIED PROBE TECHNIQUE, MAKING USE OF HIGH THROUGHPUT TECHNOLOGIES AS DESCRIBED BY CMS-2020-01-R: HCPCS

## 2021-09-02 PROCEDURE — 84443 ASSAY THYROID STIM HORMONE: CPT

## 2021-09-02 PROCEDURE — 96361 HYDRATE IV INFUSION ADD-ON: CPT

## 2021-09-02 PROCEDURE — 80048 BASIC METABOLIC PNL TOTAL CA: CPT

## 2021-09-02 PROCEDURE — 85025 COMPLETE CBC W/AUTO DIFF WBC: CPT

## 2021-09-02 PROCEDURE — 71045 X-RAY EXAM CHEST 1 VIEW: CPT

## 2021-09-02 RX ORDER — METOCLOPRAMIDE HYDROCHLORIDE 5 MG/ML
10 INJECTION INTRAMUSCULAR; INTRAVENOUS ONCE
Status: COMPLETED | OUTPATIENT
Start: 2021-09-02 | End: 2021-09-02

## 2021-09-02 RX ORDER — ONDANSETRON 2 MG/ML
4 INJECTION INTRAMUSCULAR; INTRAVENOUS ONCE
Status: COMPLETED | OUTPATIENT
Start: 2021-09-02 | End: 2021-09-02

## 2021-09-02 RX ORDER — 0.9 % SODIUM CHLORIDE 0.9 %
1000 INTRAVENOUS SOLUTION INTRAVENOUS ONCE
Status: COMPLETED | OUTPATIENT
Start: 2021-09-02 | End: 2021-09-02

## 2021-09-02 RX ORDER — ONDANSETRON 4 MG/1
4 TABLET, ORALLY DISINTEGRATING ORAL EVERY 8 HOURS PRN
Qty: 20 TABLET | Refills: 0 | Status: SHIPPED | OUTPATIENT
Start: 2021-09-02 | End: 2022-04-13

## 2021-09-02 RX ORDER — METOCLOPRAMIDE 10 MG/1
10 TABLET ORAL 4 TIMES DAILY PRN
Qty: 20 TABLET | Refills: 0 | Status: SHIPPED | OUTPATIENT
Start: 2021-09-02 | End: 2021-11-28 | Stop reason: ALTCHOICE

## 2021-09-02 RX ADMIN — ONDANSETRON 4 MG: 2 INJECTION INTRAMUSCULAR; INTRAVENOUS at 18:18

## 2021-09-02 RX ADMIN — METOCLOPRAMIDE HYDROCHLORIDE 10 MG: 5 INJECTION INTRAMUSCULAR; INTRAVENOUS at 18:19

## 2021-09-02 RX ADMIN — SODIUM CHLORIDE 1000 ML: 9 INJECTION, SOLUTION INTRAVENOUS at 18:39

## 2021-09-02 ASSESSMENT — PAIN SCALES - GENERAL: PAINLEVEL_OUTOF10: 6

## 2021-09-02 ASSESSMENT — PAIN DESCRIPTION - DESCRIPTORS: DESCRIPTORS: ACHING

## 2021-09-02 ASSESSMENT — PAIN DESCRIPTION - LOCATION: LOCATION: ABDOMEN;GENERALIZED

## 2021-09-02 ASSESSMENT — PAIN DESCRIPTION - FREQUENCY: FREQUENCY: CONTINUOUS

## 2021-09-02 NOTE — ED TRIAGE NOTES
Arrived ambulatory to room 1 for triage. Tolerated without difficulty. Bed in lowest position. Call light given. Gowned for exam. Monitor applied.

## 2021-09-02 NOTE — ED PROVIDER NOTES
[facility] [facility]      Pt Name: Steffi Fajardo  MRN: [id number]  [place] 1975of evaluation: 9/2/2021  Provider:Kishor Ford MD    CHIEF COMPLAINT       Chief Complaint   Patient presents with    Concern For COVID-19     Was sent from her physicians office with concern for COVID. States yesterday felt sluggish. This [id number] began vomitng and diarrhea. States has vomited 5 to 6 times in 24 hours. Has had 5 episodes of diarrhea. States has been coughing and sneezing. States temp has been around 99. C/O chills. States has taken nothing today d/t nausea.  Shortness of Breath    Diarrhea    Emesis    Nausea         HISTORY OF PRESENT ILLNESS    Steffi Fajardo is a 42 y.o. female who presents to the emergency department with headache, nausea, vomiting, diarrhea. Patient has had the symptoms for 1 day. Is having diarrhea constantly. Says that she cannot tolerate food and fluid because she vomits. Not having abdominal pain. No chest pain. Patient is missing hypothyroid but is not on any thyroid medication. Not having fevers. Nursing Notes were reviewed. REVIEW OF SYSTEMS       Review of Systems    10 point review of systems was performed and was negative exceptas specifically noted in the HPI.       PAST MEDICAL HISTORY     Past Medical History:   Diagnosis Date    Anxiety     Anxiety     Depression     Headache     migraines    Palpitation     Thyroid disease          SURGICAL HISTORY       Past Surgical History:   Procedure Laterality Date    BREAST REDUCTION SURGERY      HYSTERECTOMY      THYROIDECTOMY, PARTIAL      1992    TYMPANOSTOMY TUBE PLACEMENT           CURRENT MEDICATIONS       Previous Medications    ACETAMINOPHEN (AMINOFEN) 325 MG TABLET    Take 2 tablets by mouth every 4 hours as needed for Pain    ATORVASTATIN (LIPITOR) 20 MG TABLET        CLONAZEPAM (KLONOPIN) 0.5 MG TABLET    Take 1 tablet by mouth 2 times daily as needed for Anxiety for up to 3 days. ESCITALOPRAM (LEXAPRO) 20 MG TABLET    Take 20 mg by mouth daily    OMEPRAZOLE (PRILOSEC) 40 MG DELAYED RELEASE CAPSULE    Take 40 mg by mouth daily       ALLERGIES     Benadryl [diphenhydramine] and Nsaids    FAMILY HISTORY       Family History   Problem Relation Age of Onset    Cancer Mother     Heart Disease Mother     Cancer Father     Heart Disease Father           SOCIAL HISTORY       Social History     Socioeconomic History    Marital status:      Spouse name: None    Number of children: None    Years of education: None    Highest education level: None   Occupational History    None   Tobacco Use    Smoking status: Never Smoker    Smokeless tobacco: Never Used   Vaping Use    Vaping Use: Never used   Substance and Sexual Activity    Alcohol use: No    Drug use: No    Sexual activity: Yes     Partners: Male   Other Topics Concern    None   Social History Narrative    None     Social Determinants of Health     Financial Resource Strain:     Difficulty of Paying Living Expenses:    Food Insecurity:     Worried About Running Out of Food in the Last Year:     Ran Out of Food in the Last Year:    Transportation Needs:     Lack of Transportation (Medical):      Lack of Transportation (Non-Medical):    Physical Activity:     Days of Exercise per Week:     Minutes of Exercise per Session:    Stress:     Feeling of Stress :    Social Connections:     Frequency of Communication with Friends and Family:     Frequency of Social Gatherings with Friends and Family:     Attends Islam Services:     Active Member of Clubs or Organizations:     Attends Club or Organization Meetings:     Marital Status:    Intimate Partner Violence:     Fear of Current or Ex-Partner:     Emotionally Abused:     Physically Abused:     Sexually Abused:        SCREENINGS    [place] Coma Scale  Eye Opening: Spontaneous  Best Verbal Response: Oriented  Best Motor Response: Obeys commands  Farnaz Coma Scale Score: 15        PHYSICAL EXAM       ED Triage Vitals [09/02/21 1522]   BP Temp Temp Source Pulse Resp SpO2 Height Weight   (!) 161/88 98.5 °F (36.9 °C) Oral 105 18 99 % 5' (1.524 m) 246 lb (111.6 kg)       Physical Exam  General appearance: Alert, cooperative, no distress, appears stated age. Head:  Normocephalic, without obvious abnormality, atraumatic. HEENT: Mucous membranes moist.  Neck: Full ROM, trachea midline, no JVD  Lungs: No respiratory distress  Cardiovasular: Perfusing extremities  Abdomen: Nontender, no guarding  Extremities: Atraumatic, full ROM  Skin: No rashes or lesions to exposed skin  Neurologic: Alert and oriented x3, motor grossly normal, clear speech    DIAGNOSTIC RESULTS     EKG:     IMAGING:   Non-plain film images such as CT, Ultrasound and MRI are read by the radiologist.Plain radiographic images are visualized and preliminarily interpreted by the emergency physician with the below findings:      No pneumonia  Interpretation per the Radiologist below, if available at the time of this note:    XR CHEST PORTABLE   Final Result   Negative portable chest.               EDBEDSIDE ULTRASOUND:   Performed by EDPhysician - none    LABS:  Labs Reviewed   CBC WITH AUTO DIFFERENTIAL - Abnormal; Notable for the following components:       Result Value    WBC 14.7 (*)     Hemoglobin 12.3 (*)     MCHC 31.6 (*)     Segs Relative 84.1 (*)     Lymphocytes % 10.0 (*)     Monocytes % 4.1 (*)     All other components within normal limits   TSH WITHOUT REFLEX   BASIC METABOLIC PANEL   [id number]       All other labs were within normal range or not returned as of this dictation.     EMERGENCY DEPARTMENT COURSE and DIFFERENTIAL DIAGNOSIS/MDM:   Vitals:    Vitals:    09/02/21 1715 09/02/21 1730 09/02/21 1745 09/02/21 1800   BP: (!) 143/79 138/83 (!) 140/86 136/85   Pulse:       Resp:       Temp:       TempSrc:       SpO2: 96% 98% 95% 95%   Weight:       Height: Medications   0.9 % sodium chloride bolus (1,000 mLs IntraVENous New Bag 9/2/21 1839)   metoclopramide (REGLAN) injection 10 mg (10 mg IntraVENous Given 9/2/21 1819)   ondansetron (ZOFRAN) injection 4 mg (4 mg IntraVENous Given 9/2/21 1818)       MDM     Patient presents with nausea, vomiting, diarrhea and headache. Vital signs are stable, examination shows no abdominal tenderness palpation, patient sitting comfortably on room air. Although there was initially a report of the patient being hypoxic at her primary care's office, she is saturating normally on room air here. Her chest x-ray shows no pneumonia, her labs are largely unremarkable including her TSH which is normal.  Patient's symptoms improved with Zofran and Reglan, I will prescribe these for home in addition to telling the patient to push fluids. Patient discharged. REASSESSMENT          CRITICAL CARE TIME   Critical Care time was 0 minutes, excluding separately reportable procedures. There was a high probability of clinically significant/life threatening deteriorationin the patient's condition which required my urgent intervention. CONSULTS:  None     PROCEDURES:  Unless otherwise noted below, none     Procedures    FINAL IMPRESSION      1. Non-intractable vomiting with nausea, unspecified vomiting type    2. Nonintractable headache, unspecified chronicity pattern, unspecified headache type    3. Dehydration          DISPOSITION/PLAN   DISPOSITION Decision To Discharge 09/02/2021 07:02:41 PM      PATIENT REFERRED TO:  No follow-up provider specified. DISCHARGE MEDICATIONS:  New Prescriptions    METOCLOPRAMIDE (REGLAN) 10 MG TABLET    Take 1 tablet by mouth 4 times daily as needed (Headache/nausea) WARNING:  May cause drowsiness. May impair ability to operate vehicles or machinery. Do not use in combination with alcohol.     ONDANSETRON (ZOFRAN ODT) 4 MG DISINTEGRATING TABLET    Take 1 tablet by mouth every 8 hours as needed

## 2021-09-03 ENCOUNTER — CARE COORDINATION (OUTPATIENT)
Dept: CARE COORDINATION | Age: 46
End: 2021-09-03

## 2021-09-03 LAB — SARS-COV-2: NOT DETECTED

## 2021-11-24 ENCOUNTER — HOSPITAL ENCOUNTER (EMERGENCY)
Age: 46
Discharge: HOME | End: 2021-11-24
Attending: EMERGENCY MEDICINE
Payer: COMMERCIAL

## 2021-11-24 VITALS
HEIGHT: 60 IN | RESPIRATION RATE: 18 BRPM | BODY MASS INDEX: 48.88 KG/M2 | DIASTOLIC BLOOD PRESSURE: 92 MMHG | WEIGHT: 249 LBS | SYSTOLIC BLOOD PRESSURE: 139 MMHG | OXYGEN SATURATION: 93 % | TEMPERATURE: 97.9 F | HEART RATE: 88 BPM

## 2021-11-24 DIAGNOSIS — R50.9 ACUTE FEBRILE ILLNESS: Primary | ICD-10-CM

## 2021-11-24 PROCEDURE — 6360000002 HC RX W HCPCS: Performed by: EMERGENCY MEDICINE

## 2021-11-24 PROCEDURE — U0005 INFEC AGEN DETEC AMPLI PROBE: HCPCS

## 2021-11-24 PROCEDURE — U0003 INFECTIOUS AGENT DETECTION BY NUCLEIC ACID (DNA OR RNA); SEVERE ACUTE RESPIRATORY SYNDROME CORONAVIRUS 2 (SARS-COV-2) (CORONAVIRUS DISEASE [COVID-19]), AMPLIFIED PROBE TECHNIQUE, MAKING USE OF HIGH THROUGHPUT TECHNOLOGIES AS DESCRIBED BY CMS-2020-01-R: HCPCS

## 2021-11-24 PROCEDURE — 96372 THER/PROPH/DIAG INJ SC/IM: CPT

## 2021-11-24 PROCEDURE — 99285 EMERGENCY DEPT VISIT HI MDM: CPT

## 2021-11-24 PROCEDURE — 6370000000 HC RX 637 (ALT 250 FOR IP): Performed by: EMERGENCY MEDICINE

## 2021-11-24 RX ORDER — ACETAMINOPHEN 500 MG
1000 TABLET ORAL ONCE
Status: COMPLETED | OUTPATIENT
Start: 2021-11-24 | End: 2021-11-24

## 2021-11-24 RX ORDER — BENZONATATE 100 MG/1
100 CAPSULE ORAL ONCE
Status: COMPLETED | OUTPATIENT
Start: 2021-11-24 | End: 2021-11-24

## 2021-11-24 RX ORDER — KETOROLAC TROMETHAMINE 30 MG/ML
30 INJECTION, SOLUTION INTRAMUSCULAR; INTRAVENOUS ONCE
Status: COMPLETED | OUTPATIENT
Start: 2021-11-24 | End: 2021-11-24

## 2021-11-24 RX ORDER — BENZONATATE 100 MG/1
100 CAPSULE ORAL 3 TIMES DAILY PRN
Qty: 10 CAPSULE | Refills: 0 | Status: SHIPPED | OUTPATIENT
Start: 2021-11-24 | End: 2021-12-01

## 2021-11-24 RX ADMIN — KETOROLAC TROMETHAMINE 30 MG: 30 INJECTION, SOLUTION INTRAMUSCULAR; INTRAVENOUS at 21:48

## 2021-11-24 RX ADMIN — ACETAMINOPHEN 1000 MG: 500 TABLET ORAL at 21:47

## 2021-11-24 RX ADMIN — BENZONATATE 100 MG: 100 CAPSULE ORAL at 21:48

## 2021-11-24 ASSESSMENT — PAIN SCALES - GENERAL
PAINLEVEL_OUTOF10: 3
PAINLEVEL_OUTOF10: 6
PAINLEVEL_OUTOF10: 7

## 2021-11-24 ASSESSMENT — PAIN DESCRIPTION - LOCATION
LOCATION: HEAD
LOCATION: HEAD

## 2021-11-24 ASSESSMENT — PAIN DESCRIPTION - PAIN TYPE
TYPE: ACUTE PAIN
TYPE: ACUTE PAIN

## 2021-11-25 NOTE — ED NOTES
Discharge instructions reviewed and pt acknowledges understanding. Ambulatory at discharge.      Pb Miller RN  11/24/21 1898

## 2021-11-25 NOTE — ED NOTES
States having headache, sore throat and fever. When asked if she had been around anyone known to have COVID, states mother in [place] tested positive a couple days ago.      Edda Rodas RN  11/24/21 0673

## 2021-11-25 NOTE — ED PROVIDER NOTES
Triage Chief Complaint:   Pharyngitis, Fever, and Headache    [place]:  Rahul Gerardo is a 42 y.o. female that presents with 3 days of headache, sore throat, cough, achiness in her chest, body aches, fevers. She has been taking daytime and nighttime Tylenol without improvement in symptoms. States the coughing is keeping her up at night. Mother-in-law was recently diagnosed with Covid. Denies any vomiting, diarrhea, abdominal pain. ROS:  At least 10 systems reviewed and otherwise acutely negative except as in the [address] Past Medical History:   Diagnosis Date    Anxiety     Anxiety     Depression     Headache     migraines    Palpitation     Thyroid disease      Past Surgical History:   Procedure Laterality Date    BREAST REDUCTION SURGERY      HYSTERECTOMY      THYROIDECTOMY, PARTIAL      1992    TYMPANOSTOMY TUBE PLACEMENT       Family History   Problem Relation Age of Onset    Cancer Mother     Heart Disease Mother     Cancer Father     Heart Disease Father      Social History     Socioeconomic History    Marital status:      Spouse name: Not on file    Number of children: Not on file    Years of education: Not on file    Highest education level: Not on file   Occupational History    Not on file   Tobacco Use    Smoking status: Never Smoker    Smokeless tobacco: Never Used   Vaping Use    Vaping Use: Never used   Substance and Sexual Activity    Alcohol use: No    Drug use: No    Sexual activity: Yes     Partners: Male   Other Topics Concern    Not on file   Social History Narrative    Not on file     Social Determinants of Health     Financial Resource Strain:     Difficulty of Paying Living Expenses: Not on file   Food Insecurity:     Worried About Running Out of Food in the Last Year: Not on file    Hyacinth of Food in the Last Year: Not on file   Transportation Needs:     Lack of Transportation (Medical): Not on file    Lack of Transportation (Non-Medical):  Not on file Physical Activity:     Days of Exercise per Week: Not on file    Minutes of Exercise per Session: Not on file   Stress:     Feeling of Stress : Not on file   Social Connections:     Frequency of Communication with Friends and Family: Not on file    Frequency of Social Gatherings with Friends and Family: Not on file    Attends Yazidi Services: Not on file    Active Member of Clubs or Organizations: Not on file    Attends Club or Organization Meetings: Not on file    Marital Status: Not on file   Intimate Partner Violence:     Fear of Current or Ex-Partner: Not on file    Emotionally Abused: Not on file    Physically Abused: Not on file    Sexually Abused: Not on file   Housing Stability:     Unable to Pay for Housing in the Last Year: Not on file    Number of [place] in the Last Year: Not on file    Unstable Housing in the Last Year: Not on file     Current Facility-Administered Medications   Medication Dose Route Frequency Provider Last Rate Last Admin    ketorolac (TORADOL) injection 30 mg  30 mg IntraMUSCular Once Star Freedman MD        acetaminophen (TYLENOL) tablet 1,000 mg  1,000 mg Oral Once Star Freedman MD        benzonatate (TESSALON) capsule 100 mg  100 mg Oral Once Star Freedman MD         Current Outpatient Medications   Medication Sig Dispense Refill    benzonatate (TESSALON PERLES) 100 MG capsule Take 1 capsule by mouth 3 times daily as needed for Cough 10 capsule 0    metoclopramide (REGLAN) 10 MG tablet Take 1 tablet by mouth 4 times daily as needed (Headache/nausea) WARNING:  May cause drowsiness. May impair ability to operate vehicles or machinery. Do not use in combination with alcohol.  20 tablet 0    ondansetron (ZOFRAN ODT) 4 MG disintegrating tablet Take 1 tablet by mouth every 8 hours as needed for Nausea 20 tablet 0    acetaminophen (AMINOFEN) 325 MG tablet Take 2 tablets by mouth every 4 hours as needed for Pain 120 tablet 3    atorvastatin (LIPITOR) 20 MG tablet       omeprazole (PRILOSEC) 40 MG delayed release capsule Take 40 mg by mouth daily      escitalopram (LEXAPRO) 20 MG tablet Take 20 mg by mouth daily      clonazePAM (KLONOPIN) 0.5 MG tablet Take 1 tablet by mouth 2 times daily as needed for Anxiety for up to 3 days. 6 tablet 0     Allergies   Allergen Reactions    Benadryl [Diphenhydramine] Anxiety     Severe anxiety and agitation. States can take it oral but not IV    Nsaids Rash       Nursing Notes Reviewed    Physical Exam:  ED Triage Vitals   Enc Vitals Group      BP 11/24/21 2111 (!) 139/92      Pulse 11/24/21 2112 88      Resp 11/24/21 2112 18      Temp 11/24/21 2112 97.9 °F (36.6 °C)      Temp Source 11/24/21 2112 Oral      SpO2 11/24/21 2111 93 %      Weight 11/24/21 2106 249 lb (112.9 kg)      Height 11/24/21 2106 5' (1.524 m)      Head Circumference --       Peak Flow --       Pain Score --       Pain Loc --       Pain Edu? --       Excl. in [address]? --      GENERAL APPEARANCE: Awake and alert. Cooperative. No acute distress. HEAD: Normocephalic. Atraumatic. EYES: EOM's grossly intact. Sclera anicteric. ENT: Mucous membranes are moist. Tolerates saliva. No trismus. Posterior pharyngeal erythema without exudate or asymmetry  NECK: Supple. No meningismus. Trachea midline. HEART: RRR. Radial pulses 2+. LUNGS: Respirations unlabored. CTAB  ABDOMEN: Soft. Non-tender. No guarding or rebound. EXTREMITIES: No acute deformities. SKIN: Warm and dry. NEUROLOGICAL: No gross facial drooping. Moves all 4 extremities spontaneously. PSYCHIATRIC: Normal mood. I have reviewed and interpreted all of the currently available lab results from this visit (if applicable):  No results found for this visit on 11/24/21.    Radiographs (if obtained):  [] The following radiograph was interpreted by myself in the absence of a radiologist:  [] Radiologist's Report Reviewed:    EKG (if obtained): (All EKG's are interpreted by myself in the absence of a cardiologist)    MDM:  Plan of care is discussed thoroughly with the patient and family if present. If performed, all imaging and lab work also discussed with patient. All relevant prior results and chart reviewed if available. Patient presents as above. She is in no acute distress and has normal vital signs. She has clear lung sounds bilaterally and about nine abdominal exam. Overall presentation most consistent with viral syndrome, possibly Covid especially in light of recent sick contact. Covid swab is sent. Patient is given Toradol, Tylenol and Tessalon Perles for symptomatic relief of symptoms. I have low suspicion for other malignant infectious process that requires further work-up here in the emergency department. She is discharged in good condition. Clinical Impression:  1.  Acute febrile illness      (Please note that portions of this note may have been completed with a voice recognition program. Efforts were made to edit the dictations but occasionally words are mis-transcribed.)    MD Christiano Herzog Mt, MD  11/24/21 2120       Misti Muñoz MD  11/24/21 2121

## 2021-11-26 LAB
SARS-COV-2: DETECTED
SOURCE: ABNORMAL

## 2021-11-28 ENCOUNTER — HOSPITAL ENCOUNTER (EMERGENCY)
Age: 46
Discharge: HOME | End: 2021-11-28
Attending: EMERGENCY MEDICINE
Payer: COMMERCIAL

## 2021-11-28 VITALS
HEIGHT: 60 IN | HEART RATE: 95 BPM | WEIGHT: 240 LBS | OXYGEN SATURATION: 93 % | BODY MASS INDEX: 47.12 KG/M2 | RESPIRATION RATE: 20 BRPM | SYSTOLIC BLOOD PRESSURE: 125 MMHG | TEMPERATURE: 100.8 F | DIASTOLIC BLOOD PRESSURE: 80 MMHG

## 2021-11-28 DIAGNOSIS — U07.1 COVID: ICD-10-CM

## 2021-11-28 DIAGNOSIS — R52 BODY ACHES: ICD-10-CM

## 2021-11-28 DIAGNOSIS — R11.0 NAUSEA: Primary | ICD-10-CM

## 2021-11-28 PROCEDURE — 96372 THER/PROPH/DIAG INJ SC/IM: CPT

## 2021-11-28 PROCEDURE — 99285 EMERGENCY DEPT VISIT HI MDM: CPT

## 2021-11-28 PROCEDURE — 6360000002 HC RX W HCPCS: Performed by: EMERGENCY MEDICINE

## 2021-11-28 PROCEDURE — 6370000000 HC RX 637 (ALT 250 FOR IP): Performed by: EMERGENCY MEDICINE

## 2021-11-28 RX ORDER — KETOROLAC TROMETHAMINE 30 MG/ML
30 INJECTION, SOLUTION INTRAMUSCULAR; INTRAVENOUS ONCE
Status: COMPLETED | OUTPATIENT
Start: 2021-11-28 | End: 2021-11-28

## 2021-11-28 RX ORDER — ACETAMINOPHEN 325 MG/1
650 TABLET ORAL EVERY 6 HOURS PRN
Qty: 120 TABLET | Refills: 0 | Status: SHIPPED | OUTPATIENT
Start: 2021-11-28

## 2021-11-28 RX ORDER — METOCLOPRAMIDE 10 MG/1
10 TABLET ORAL ONCE
Status: COMPLETED | OUTPATIENT
Start: 2021-11-28 | End: 2021-11-28

## 2021-11-28 RX ORDER — METOCLOPRAMIDE 10 MG/1
10 TABLET ORAL 3 TIMES DAILY PRN
Qty: 30 TABLET | Refills: 0 | Status: SHIPPED | OUTPATIENT
Start: 2021-11-28 | End: 2022-04-13

## 2021-11-28 RX ADMIN — DEXAMETHASONE 10 MG: 4 TABLET ORAL at 23:40

## 2021-11-28 RX ADMIN — KETOROLAC TROMETHAMINE 30 MG: 30 INJECTION, SOLUTION INTRAMUSCULAR; INTRAVENOUS at 23:43

## 2021-11-28 RX ADMIN — METOCLOPRAMIDE 10 MG: 10 TABLET ORAL at 23:40

## 2021-11-28 ASSESSMENT — PAIN DESCRIPTION - PAIN TYPE: TYPE: ACUTE PAIN

## 2021-11-28 ASSESSMENT — PAIN DESCRIPTION - LOCATION: LOCATION: HEAD

## 2021-11-28 ASSESSMENT — PAIN SCALES - GENERAL
PAINLEVEL_OUTOF10: 10
PAINLEVEL_OUTOF10: 10

## 2021-11-29 NOTE — ED NOTES
Discharge instructions and prescriptions reviewed with pt and verbalizes understanding.      Jeovany Junior RN  11/28/21 7130

## 2021-11-29 NOTE — ED PROVIDER NOTES
Emergency [facility] DEPARTMENT    Patient: Maricruz Krishnan  MRN: [id number]  : 1975  Date of Evaluation: 2021  ED Provider: Macr Workman MD    Chief Complaint       Chief Complaint   Patient presents with    Positive For Covid-19     chest pain, cough, headache    Emesis     states unable to eat or drink     Andreina Reid is a 42 y.o. female who presents to the emergency department with concerns for generalized body aches, nausea, decreased appetite. The patient is unvaccinated against Covid. She was diagnosed with Covid within the last week. She has been taking Zofran at home but continues to have a lack of appetite. No vomiting. No diarrhea or constipation. ROS:     At least 10 systems reviewed and otherwise acutely negative except as in the [address]     Past History     Past Medical History:   Diagnosis Date    Anxiety     Anxiety     Depression     Headache     migraines    Palpitation     Thyroid disease      Past Surgical History:   Procedure Laterality Date    BREAST REDUCTION SURGERY      HYSTERECTOMY      THYROIDECTOMY, PARTIAL      1992    TYMPANOSTOMY TUBE PLACEMENT       Social History     Socioeconomic History    Marital status:      Spouse name: None    Number of children: None    Years of education: None    Highest education level: None   Occupational History    None   Tobacco Use    Smoking status: Never Smoker    Smokeless tobacco: Never Used   Vaping Use    Vaping Use: Never used   Substance and Sexual Activity    Alcohol use: No    Drug use: No    Sexual activity: Yes     Partners: Male   Other Topics Concern    None   Social History Narrative    None     Social Determinants of Health     Financial Resource Strain:     Difficulty of Paying Living Expenses: Not on file   Food Insecurity:     Worried About Running Out of Food in the Last Year: Not on file    Hyacinth of Food in the Last Year: Not on file   Transportation Needs:     Lack of Transportation (Medical): Not on file    Lack of Transportation (Non-Medical): Not on file   Physical Activity:     Days of Exercise per Week: Not on file    Minutes of Exercise per Session: Not on file   Stress:     Feeling of Stress : Not on file   Social Connections:     Frequency of Communication with Friends and Family: Not on file    Frequency of Social Gatherings with Friends and Family: Not on file    Attends Religion Services: Not on file    Active Member of [address] [place] [address] or Organizations: Not on file    Attends Club or Organization Meetings: Not on file    Marital Status: Not on file   Intimate Partner Violence:     Fear of Current or Ex-Partner: Not on file    Emotionally Abused: Not on file    Physically Abused: Not on file    Sexually Abused: Not on file   Housing Stability:     Unable to Pay for Housing in the Last Year: Not on file    Number of [place] in the Last Year: Not on file    Unstable Housing in the Last Year: Not on file       Medications/Allergies     Discharge Medication List as of 11/28/2021 11:31 PM      CONTINUE these medications which have NOT CHANGED    Details   benzonatate (TESSALON PERLES) 100 MG capsule Take 1 capsule by mouth 3 times daily as needed for Cough, Disp-10 capsule, R-0Normal      ondansetron (ZOFRAN ODT) 4 MG disintegrating tablet Take 1 tablet by mouth every 8 hours as needed for Nausea, Disp-20 tablet, R-0Normal      atorvastatin (LIPITOR) 20 MG tablet Historical Med      omeprazole (PRILOSEC) 40 MG delayed release capsule Take 40 mg by mouth dailyHistorical Med      escitalopram (LEXAPRO) 20 MG tablet Take 20 mg by mouth dailyHistorical Med      clonazePAM (KLONOPIN) 0.5 MG tablet Take 1 tablet by mouth 2 times daily as needed for Anxiety for up to 3 days. , Disp-6 tablet, R-0Print           Allergies   Allergen Reactions    Benadryl [Diphenhydramine] Anxiety     Severe anxiety and agitation.  States can take it oral but not IV    Nsaids Rash        Physical Exam       ED Triage Vitals [11/28/21 2018]   BP Temp Temp src Pulse Resp SpO2 Height Weight   (!) 122/92 100.8 °F (38.2 °C) -- 99 22 93 % 5' (1.524 m) 240 lb (108.9 kg)     GENERAL APPEARANCE: Awake and alert. Cooperative. No acute distress. HEAD: Normocephalic. Atraumatic. EYES: Sclera anicteric. ENT: Tolerates saliva. No trismus. NECK: Supple. Trachea midline. CARDIO: RRR. Radial pulse 2+. LUNGS: Respirations unlabored. CTAB. ABDOMEN: Soft. Non-distended. Non-tender. EXTREMITIES: No acute deformities. SKIN: Warm and dry. NEUROLOGICAL: No gross facial drooping. Moves all 4 extremities spontaneously. PSYCHIATRIC: Normal mood. Diagnostics   Labs:  No results found for this visit on 11/28/21. Radiographs:  No results found. Procedures/EKG:   ED Course and MDM   In brief, Bart Presley is a 42 y.o. female who presented to the emergency department with symptoms consistent with Covid. No evidence of hypoxemia or profound dehydration on her exam.  Patient was given Reglan and a Toradol shot here and a dose of dexamethasone. She was instructed to continue to quarantine and recommended she get vaccinated when she was considered to be Covid recovered. Stable at the time of discharge with return precautions and follow-up instructions. ED Medication Orders (From admission, onward)    Start Ordered     Status Ordering Provider    11/28/21 2330 11/28/21 2319  metoclopramide (REGLAN) tablet 10 mg  ONCE         Last MAR action: Given - by Lily Blackburn on 11/28/21 at [address] [facility]    11/28/21 2330 11/28/21 2319  ketorolac (TORADOL) injection 30 mg  ONCE         Last MAR action: Given - by Lily Blackburn on 11/28/21 at [address] [facility]    11/28/21 2330 11/28/21 2321  dexamethasone (DECADRON) tablet 10 mg  ONCE         Last MAR action: Given - by Lily Blackburn on 11/28/21 at [address] [facility]          Final Impression      1.  Nausea 2. COVID    3.  Body aches      DISPOSITION Decision To Discharge 11/28/2021 11:19:59 PM     (Please note that portions of this note may have been completed with a voice recognition program. Efforts were made to edit the dictations but occasionally words are mis-transcribed.)    Sander Fothergill, MD [place], MD  11/29/21 9194

## 2021-12-01 ENCOUNTER — APPOINTMENT (OUTPATIENT)
Dept: CT IMAGING | Age: 46
End: 2021-12-01
Payer: COMMERCIAL

## 2021-12-01 ENCOUNTER — HOSPITAL ENCOUNTER (EMERGENCY)
Age: 46
Discharge: HOME | End: 2021-12-01
Payer: COMMERCIAL

## 2021-12-01 ENCOUNTER — APPOINTMENT (OUTPATIENT)
Dept: GENERAL RADIOLOGY | Age: 46
End: 2021-12-01
Payer: COMMERCIAL

## 2021-12-01 VITALS
DIASTOLIC BLOOD PRESSURE: 64 MMHG | HEART RATE: 94 BPM | BODY MASS INDEX: 47.12 KG/M2 | SYSTOLIC BLOOD PRESSURE: 122 MMHG | OXYGEN SATURATION: 96 % | RESPIRATION RATE: 17 BRPM | WEIGHT: 240 LBS | TEMPERATURE: 98.9 F | HEIGHT: 60 IN

## 2021-12-01 DIAGNOSIS — J18.9 MULTIFOCAL PNEUMONIA: Primary | ICD-10-CM

## 2021-12-01 DIAGNOSIS — U07.1 COVID-19: ICD-10-CM

## 2021-12-01 LAB
ALBUMIN SERPL-MCNC: 4.4 GM/DL (ref 3.4–5)
ALP BLD-CCNC: 71 IU/L (ref 40–129)
ALT SERPL-CCNC: 25 U/L (ref 10–40)
ANION GAP SERPL CALCULATED.3IONS-SCNC: 12 MMOL/L (ref 4–16)
AST SERPL-CCNC: 25 IU/L (ref 15–37)
BASOPHILS ABSOLUTE: 0 K/CU MM
BASOPHILS RELATIVE PERCENT: 0.5 % (ref 0–1)
BILIRUB SERPL-MCNC: 0.9 MG/DL (ref 0–1)
BUN BLDV-MCNC: 13 MG/DL (ref 6–23)
CALCIUM SERPL-MCNC: 9.1 MG/DL (ref 8.3–10.6)
CHLORIDE BLD-SCNC: 101 MMOL/L (ref 99–110)
CO2: 30 MMOL/L (ref 21–32)
CREAT SERPL-MCNC: 0.7 MG/DL (ref 0.6–1.1)
DIFFERENTIAL TYPE: ABNORMAL
EOSINOPHILS ABSOLUTE: 0 K/CU MM
EOSINOPHILS RELATIVE PERCENT: 0 % (ref 0–3)
GFR AFRICAN AMERICAN: >60 ML/MIN/1.73M2
GFR NON-AFRICAN AMERICAN: >60 ML/MIN/1.73M2
GLUCOSE BLD-MCNC: 101 MG/DL (ref 70–99)
HCT VFR BLD CALC: 44.5 % (ref 37–47)
HEMOGLOBIN: 13.5 GM/DL (ref 12.5–16)
IMMATURE NEUTROPHIL %: 0.8 % (ref 0–0.43)
LYMPHOCYTES ABSOLUTE: 0.9 K/CU MM
LYMPHOCYTES RELATIVE PERCENT: 14.4 % (ref 24–44)
MAGNESIUM: 1.9 MG/DL (ref 1.8–2.4)
MCH RBC QN AUTO: 29 PG (ref 27–31)
MCHC RBC AUTO-ENTMCNC: 30.3 % (ref 32–36)
MCV RBC AUTO: 95.5 FL (ref 78–100)
MONOCYTES ABSOLUTE: 0.4 K/CU MM
MONOCYTES RELATIVE PERCENT: 6.9 % (ref 0–4)
NUCLEATED RBC %: 0 %
PDW BLD-RTO: 13.6 % (ref 11.7–14.9)
PLATELET # BLD: 243 K/CU MM (ref 140–440)
PMV BLD AUTO: 10.7 FL (ref 7.5–11.1)
POTASSIUM SERPL-SCNC: 3.9 MMOL/L (ref 3.5–5.1)
PRO-BNP: 114.4 PG/ML
RBC # BLD: 4.66 M/CU MM (ref 4.2–5.4)
SEGMENTED NEUTROPHILS ABSOLUTE COUNT: 4.7 K/CU MM
SEGMENTED NEUTROPHILS RELATIVE PERCENT: 77.4 % (ref 36–66)
SODIUM BLD-SCNC: 143 MMOL/L (ref 135–145)
TOTAL IMMATURE NEUTOROPHIL: 0.05 K/CU MM
TOTAL NUCLEATED RBC: 0 K/CU MM
TOTAL PROTEIN: 7.5 GM/DL (ref 6.4–8.2)
TROPONIN T: <0.01 NG/ML
TSH HIGH SENSITIVITY: 1.22 UIU/ML (ref 0.27–4.2)
WBC # BLD: 6.1 K/CU MM (ref 4–10.5)

## 2021-12-01 PROCEDURE — 83735 ASSAY OF MAGNESIUM: CPT

## 2021-12-01 PROCEDURE — 85025 COMPLETE CBC W/AUTO DIFF WBC: CPT

## 2021-12-01 PROCEDURE — 71045 X-RAY EXAM CHEST 1 VIEW: CPT

## 2021-12-01 PROCEDURE — 6360000002 HC RX W HCPCS: Performed by: PHYSICIAN ASSISTANT

## 2021-12-01 PROCEDURE — 83880 ASSAY OF NATRIURETIC PEPTIDE: CPT

## 2021-12-01 PROCEDURE — 93005 ELECTROCARDIOGRAM TRACING: CPT | Performed by: PHYSICIAN ASSISTANT

## 2021-12-01 PROCEDURE — 84443 ASSAY THYROID STIM HORMONE: CPT

## 2021-12-01 PROCEDURE — 80053 COMPREHEN METABOLIC PANEL: CPT

## 2021-12-01 PROCEDURE — 6360000004 HC RX CONTRAST MEDICATION: Performed by: PHYSICIAN ASSISTANT

## 2021-12-01 PROCEDURE — 70450 CT HEAD/BRAIN W/O DYE: CPT

## 2021-12-01 PROCEDURE — 99285 EMERGENCY DEPT VISIT HI MDM: CPT

## 2021-12-01 PROCEDURE — 96374 THER/PROPH/DIAG INJ IV PUSH: CPT

## 2021-12-01 PROCEDURE — 84484 ASSAY OF TROPONIN QUANT: CPT

## 2021-12-01 PROCEDURE — 71275 CT ANGIOGRAPHY CHEST: CPT

## 2021-12-01 PROCEDURE — 6370000000 HC RX 637 (ALT 250 FOR IP): Performed by: PHYSICIAN ASSISTANT

## 2021-12-01 PROCEDURE — 2580000003 HC RX 258: Performed by: PHYSICIAN ASSISTANT

## 2021-12-01 RX ORDER — GUAIFENESIN AND CODEINE PHOSPHATE 100; 10 MG/5ML; MG/5ML
10 SOLUTION ORAL 3 TIMES DAILY PRN
Qty: 100 ML | Refills: 0 | Status: SHIPPED | OUTPATIENT
Start: 2021-12-01 | End: 2021-12-08

## 2021-12-01 RX ORDER — 0.9 % SODIUM CHLORIDE 0.9 %
1000 INTRAVENOUS SOLUTION INTRAVENOUS ONCE
Status: COMPLETED | OUTPATIENT
Start: 2021-12-01 | End: 2021-12-01

## 2021-12-01 RX ORDER — KETOROLAC TROMETHAMINE 30 MG/ML
30 INJECTION, SOLUTION INTRAMUSCULAR; INTRAVENOUS ONCE
Status: COMPLETED | OUTPATIENT
Start: 2021-12-01 | End: 2021-12-01

## 2021-12-01 RX ORDER — ACETAMINOPHEN 500 MG
1000 TABLET ORAL ONCE
Status: COMPLETED | OUTPATIENT
Start: 2021-12-01 | End: 2021-12-01

## 2021-12-01 RX ORDER — SODIUM CHLORIDE 0.9 % (FLUSH) 0.9 %
5-40 SYRINGE (ML) INJECTION 2 TIMES DAILY
Status: DISCONTINUED | OUTPATIENT
Start: 2021-12-01 | End: 2021-12-02 | Stop reason: HOSPADM

## 2021-12-01 RX ADMIN — SODIUM CHLORIDE 1000 ML: 9 INJECTION, SOLUTION INTRAVENOUS at 20:00

## 2021-12-01 RX ADMIN — ACETAMINOPHEN 1000 MG: 500 TABLET ORAL at 19:38

## 2021-12-01 RX ADMIN — KETOROLAC TROMETHAMINE 30 MG: 30 INJECTION, SOLUTION INTRAMUSCULAR; INTRAVENOUS at 21:40

## 2021-12-01 RX ADMIN — IOPAMIDOL 75 ML: 755 INJECTION, SOLUTION INTRAVENOUS at 20:55

## 2021-12-01 ASSESSMENT — PAIN SCALES - GENERAL
PAINLEVEL_OUTOF10: 8

## 2021-12-01 ASSESSMENT — PAIN DESCRIPTION - LOCATION: LOCATION: GENERALIZED

## 2021-12-01 ASSESSMENT — PAIN DESCRIPTION - PAIN TYPE: TYPE: ACUTE PAIN

## 2021-12-01 ASSESSMENT — PAIN DESCRIPTION - FREQUENCY: FREQUENCY: CONTINUOUS

## 2021-12-01 NOTE — ED PROVIDER NOTES
As provider-in-triage, I performed a medical screening history and physical exam on this patient. HISTORY OF PRESENT ILLNESS  Maricruz Krishnan is a 42 y.o. female who presents to the emergency department today stating that she has syncopal and collapse episode. She is recently diagnosed with Covid. She states she been generally weak, decreased oral intake. Has had cough congestion progressive shortness of breath. States that she had an unwitnessed syncopal episode at her home today was found on the bathroom floor by her daughter. Brought in for further evaluation. Describing no chest pain at the moment. Does describe short of breath, has felt generally weak. Has no significant coronary artery disease, pulmonary disease. Describes no history of blood clots. She is not anticoagulated. PHYSICAL EXAM  BP (!) 118/59   Pulse 102   Temp 99 °F (37.2 °C) (Oral)   Resp 16   Ht 5' (1.524 m)   Wt 240 lb (108.9 kg)   SpO2 95%   BMI 46.87 kg/m²     On exam, the patient appears in no acute distress. Speech is clear. Breathing is unlabored. Moves all extremities    Comment: Please note this report has been produced using speech recognition software and may contain errors related to that system including errors in grammar, punctuation, and spelling, as well as words and phrases that may be inappropriate. If there are any questions or concerns please feel free to contact the dictating provider for clarification.         Radha Urbina [address], PA  12/01/21 7387

## 2021-12-02 LAB
EKG ATRIAL RATE: 96 BPM
EKG DIAGNOSIS: NORMAL
EKG P AXIS: 22 DEGREES
EKG P-R INTERVAL: 132 MS
EKG Q-T INTERVAL: 378 MS
EKG QRS DURATION: 80 MS
EKG QTC CALCULATION (BAZETT): 477 MS
EKG R AXIS: 1 DEGREES
EKG T AXIS: 25 DEGREES
EKG VENTRICULAR RATE: 96 BPM

## 2021-12-02 PROCEDURE — 93010 ELECTROCARDIOGRAM REPORT: CPT | Performed by: INTERNAL MEDICINE

## 2021-12-02 NOTE — ED PROVIDER NOTES
Triage Chief Complaint:   Shortness of Breath, Loss of Consciousness, Fall, Emesis, and Positive For Covid-19    [place]:  Today in the ED I had the pleasure of caring for Chandra Joyner who is a 42 y.o. female that presents today to the emergency department complaining of shortness of breath. Generalized fatigue, headache, body aches, throat pain ear pain nausea vomiting. Patient was diagnosed with Covid this past Thursday. States her symptoms have been worsening. She did receive Regeneron infusion yesterday. She endorses worsening fatigue to the point where \"I am tired of it I do not want to do it anymore\".   When asked patient if she suicidal she says \"no\"    ROS:  REVIEW OF SYSTEMS    At least 10 systems reviewed      All other review of systems are negative  See HPI and nursing notes for additional information       Past Medical History:   Diagnosis Date    Anxiety     Anxiety     Depression     Headache     migraines    Palpitation     Thyroid disease      Past Surgical History:   Procedure Laterality Date    BREAST REDUCTION SURGERY      HYSTERECTOMY      THYROIDECTOMY, PARTIAL      1992    TYMPANOSTOMY TUBE PLACEMENT       Family History   Problem Relation Age of Onset    Cancer Mother     Heart Disease Mother     Cancer Father     Heart Disease Father      Social History     Socioeconomic History    Marital status:      Spouse name: Not on file    Number of children: Not on file    Years of education: Not on file    Highest education level: Not on file   Occupational History    Not on file   Tobacco Use    Smoking status: Never Smoker    Smokeless tobacco: Never Used   Vaping Use    Vaping Use: Never used   Substance and Sexual Activity    Alcohol use: No    Drug use: No    Sexual activity: Yes     Partners: Male   Other Topics Concern    Not on file   Social History Narrative    Not on file     Social Determinants of Health     Financial Resource Strain:     Difficulty of Paying Living Expenses: Not on file   Food Insecurity:     Worried About Running Out of Food in the Last Year: Not on file    Ran Out of Food in the Last Year: Not on file   Transportation Needs:     Lack of Transportation (Medical): Not on file    Lack of Transportation (Non-Medical): Not on file   Physical Activity:     Days of Exercise per Week: Not on file    Minutes of Exercise per Session: Not on file   Stress:     Feeling of Stress : Not on file   Social Connections:     Frequency of Communication with Friends and Family: Not on file    Frequency of Social Gatherings with Friends and Family: Not on file    Attends Synagogue Services: Not on file    Active Member of [address] [place] [address] [employer] or Organizations: Not on file    Attends Club or Organization Meetings: Not on file    Marital Status: Not on file   Intimate Partner Violence:     Fear of Current or Ex-Partner: Not on file    Emotionally Abused: Not on file    Physically Abused: Not on file    Sexually Abused: Not on file   Housing Stability:     Unable to Pay for Housing in the Last Year: Not on file    Number of [place] in the Last Year: Not on file    Unstable Housing in the Last Year: Not on file     Current Facility-Administered Medications   Medication Dose Route Frequency Provider Last Rate Last Admin    sodium chloride flush 0.9 % injection 5-40 mL  5-40 mL IntraVENous BID Selene Haney PA-C         Current Outpatient Medications   Medication Sig Dispense Refill    guaiFENesin-codeine (GUAIFENESIN AC) 100-10 MG/5ML syrup Take 10 mLs by mouth 3 times daily as needed for Cough for up to 7 days.  100 mL 0    acetaminophen (AMINOFEN) 325 MG tablet Take 2 tablets by mouth every 6 hours as needed for Pain 120 tablet 0    metoclopramide (REGLAN) 10 MG tablet Take 1 tablet by mouth 3 times daily as needed (nausea and vomiting) 30 tablet 0    benzonatate (TESSALON PERLES) 100 MG capsule Take 1 capsule by mouth 3 times daily as needed for Abdomen is soft nontender nondistended. There is no firm or pulsatile masses no rebound rigidity or guarding negative Mixon's negative McBurney, no peritoneal signs  Suprapubic:  there is no tenderness to palpation over the external bladder   Musculoskeletal: 5 out of 5 strength in all 4 extremities full flexion extension abduction and adduction supination pronation of all extremities and all digits. No obvious muscle atrophy is noted. No focal muscle deficits are appreciated  Dermatology: Skin is warm and dry there is no obvious abscesses lacerations or lesions noted  Psych: Mentation is grossly normal cognition is grossly normal. Affect is appropriate  Neuro: Motor intact sensory intact cranial nerves II through XII are intact level of consciousness is normal cerebellar function is normal reflexes are grossly normal. No evidence of incontinence or loss of bowel or bladder no saddle anesthesia noted Lymphatic: There is no submandibular or cervical adenopathy appreciated.         I have reviewed and interpreted all of the currently available lab results from this visit (if applicable):  Results for orders placed or performed during the hospital encounter of 12/01/21   CBC auto diff   Result Value Ref Range    WBC 6.1 4.0 - 10.5 K/CU MM    RBC 4.66 4.2 - 5.4 M/CU MM    Hemoglobin 13.5 12.5 - 16.0 GM/DL    Hematocrit 44.5 37 - 47 %    MCV 95.5 78 - 100 FL    MCH 29.0 27 - 31 PG    MCHC 30.3 (L) 32.0 - 36.0 %    RDW 13.6 11.7 - 14.9 %    Platelets 168 127 - 042 K/CU MM    MPV 10.7 7.5 - 11.1 FL    Differential Type AUTOMATED DIFFERENTIAL     Segs Relative 77.4 (H) 36 - 66 %    Lymphocytes % 14.4 (L) 24 - 44 %    Monocytes % 6.9 (H) 0 - 4 %    Eosinophils % 0.0 0 - 3 %    Basophils % 0.5 0 - 1 %    Segs Absolute 4.7 K/CU MM    Lymphocytes Absolute 0.9 K/CU MM    Monocytes Absolute 0.4 K/CU MM    Eosinophils Absolute 0.0 K/CU MM    Basophils Absolute 0.0 K/CU MM    Nucleated RBC % 0.0 %    Total Nucleated RBC 0.0 K/CU MM Total Immature Neutrophil 0.05 K/CU MM    Immature Neutrophil % 0.8 (H) 0 - 0.43 %   CMP   Result Value Ref Range    Sodium 143 135 - 145 MMOL/L    Potassium 3.9 3.5 - 5.1 MMOL/L    Chloride 101 99 - 110 mMol/L    CO2 30 21 - 32 MMOL/L    BUN 13 6 - 23 MG/DL    CREATININE 0.7 0.6 - 1.1 MG/DL    Glucose 101 (H) 70 - 99 MG/DL    Calcium 9.1 8.3 - 10.6 MG/DL    Albumin 4.4 3.4 - 5.0 GM/DL    Total Protein 7.5 6.4 - 8.2 GM/DL    Total Bilirubin 0.9 0.0 - 1.0 MG/DL    ALT 25 10 - 40 U/L    AST 25 15 - 37 IU/L    Alkaline Phosphatase 71 40 - 129 IU/L    GFR Non-African American >60 >60 mL/min/1.73m2    GFR African American >60 >60 mL/min/1.73m2    Anion Gap 12 4 - 16   Troponin   Result Value Ref Range    Troponin T <0.010 <0.01 NG/ML   Brain Natriuretic Peptide   Result Value Ref Range    Pro-.4 <300 PG/ML   Magnesium   Result Value Ref Range    Magnesium 1.9 1.8 - 2.4 mg/dl   TSH without Reflex   Result Value Ref Range    TSH, High Sensitivity 1.220 0.270 - 4.20 uIu/ml   EKG 12 Lead   Result Value Ref Range    Ventricular Rate 96 BPM    Atrial Rate 96 BPM    P-R Interval 132 ms    QRS Duration 80 ms    Q-T Interval 378 ms    QTc Calculation (Bazett) 477 ms    P Axis 22 degrees    R Axis 1 degrees    T Axis 25 degrees    Diagnosis       Normal sinus rhythm  Nonspecific T wave abnormality  Abnormal ECG  When compared with ECG of 12-AUG-2021 12:47,  No significant change was found        Radiographs (if obtained):  [] The following radiograph was interpreted by myself in the absence of a radiologist:   [] Radiologist's Report Reviewed:  CTA PULMONARY W CONTRAST   Final Result   Respiratory motion reduces sensitivity but no convincing evidence for   pulmonary arterial embolism. Multifocal opacities within both lungs likely due to COVID pneumonia. CT HEAD WO CONTRAST   Final Result   No acute intracranial hemorrhage, mass effect, midline shift, or   hydrocephalus.   No CT signs for an acute territorial infarct. .         XR CHEST PORTABLE   Final Result   Bilateral pulmonary opacities compatible with acute COVID-19 pneumonia. EKG (if obtained):   Please See Note of attending physician for EKG interpretation. Chart review shows recent radiograph(s):  XR CHEST PORTABLE    Result Date: 12/1/2021  EXAMINATION: ONE XRAY VIEW OF THE CHEST 12/1/2021 4:30 pm COMPARISON: 09/02/2021 HISTORY: ORDERING SYSTEM PROVIDED HISTORY: syncope, covid + TECHNOLOGIST PROVIDED HISTORY: Reason for exam:->syncope, covid + Reason for Exam: syncopy    cv+ Acuity: Unknown Type of Exam: Unknown FINDINGS: New patchy bilateral pulmonary opacities, left greater than right. No pneumothorax or pleural effusion. Cardiac and mediastinal contours normal. No acute osseous abnormality. Bilateral pulmonary opacities compatible with acute COVID-19 pneumonia. MDM:     Interventions given this visit:   Orders Placed This Encounter   Medications    acetaminophen (TYLENOL) tablet 1,000 mg    0.9 % sodium chloride bolus    sodium chloride flush 0.9 % injection 5-40 mL    iopamidol (ISOVUE-370) 76 % injection 75 mL    ketorolac (TORADOL) injection 30 mg    guaiFENesin-codeine (GUAIFENESIN AC) 100-10 MG/5ML syrup     Sig: Take 10 mLs by mouth 3 times daily as needed for Cough for up to 7 days. Dispense:  100 mL     Refill:  0   Well-appearing patient. No respiratory distress presents today's emergency department here with viral syndrome. She has known Covid 19. No multifocal pneumonia is noted on her CT scan no pulmonary embolism. Vital signs of normalized here in the ED. With Tylenol. She is oxygenating well without supplemental oxygen. With exertion. Is safe for discharge home with close follow-up. We will send home with a pulse ox. Educated on return precautions.     I estimate there is LOW risk for (including but not limited to) ACUTE CORONARY SYNDROME, PNEUMONIA REQUIRING ADMISSION, SEPSIS OR BACTERIAL MENINGITIS thus I consider the discharge disposition reasonable. Patrick Barry (or their surrogate) and I have discussed the diagnosis and risks, and we agree with discharging home with close follow-up. We also discussed returning to the Emergency Department immediately if new or worsening symptoms occur. We have discussed the symptoms which are most concerning that necessitate immediate return. I independently managed patient today in the ED    BP (!) 118/59   Pulse 87   Temp 99.6 °F (37.6 °C)   Resp 16   Ht 5' (1.524 m)   Wt 240 lb (108.9 kg)   SpO2 95%   BMI 46.87 kg/m²       Clinical Impression:  1. Multifocal pneumonia    2. COVID-19        Disposition referral (if applicable):  Ramses Velasco, [address] [place] [id number]    In 2 days      [facility] Emergency Department  [facility] [phone]    If symptoms worsen or persist    Disposition medications (if applicable):  New Prescriptions    GUAIFENESIN-CODEINE (GUAIFENESIN AC) 100-10 MG/5ML SYRUP    Take 10 mLs by mouth 3 times daily as needed for Cough for up to 7 days. Comment: Please note this report has been produced using speech recognition software and may contain errors related to that system including errors in grammar, punctuation, and spelling, as well as words and phrases that may be inappropriate. If there are any questions or concerns please feel free to contact the dictating provider for clarification.       Kathy Cedeno, [id number] [place] [address]  12/01/21 0819

## 2022-03-03 ENCOUNTER — HOSPITAL ENCOUNTER (EMERGENCY)
Age: 47
Discharge: HOME | End: 2022-03-03
Attending: EMERGENCY MEDICINE
Payer: COMMERCIAL

## 2022-03-03 ENCOUNTER — APPOINTMENT (OUTPATIENT)
Dept: GENERAL RADIOLOGY | Age: 47
End: 2022-03-03
Payer: COMMERCIAL

## 2022-03-03 VITALS
HEIGHT: 60 IN | DIASTOLIC BLOOD PRESSURE: 74 MMHG | BODY MASS INDEX: 45.16 KG/M2 | RESPIRATION RATE: 18 BRPM | WEIGHT: 230 LBS | TEMPERATURE: 97 F | SYSTOLIC BLOOD PRESSURE: 137 MMHG | OXYGEN SATURATION: 98 % | HEART RATE: 79 BPM

## 2022-03-03 DIAGNOSIS — M25.561 ACUTE PAIN OF RIGHT KNEE: Primary | ICD-10-CM

## 2022-03-03 PROCEDURE — 73562 X-RAY EXAM OF KNEE 3: CPT

## 2022-03-03 PROCEDURE — 6370000000 HC RX 637 (ALT 250 FOR IP): Performed by: EMERGENCY MEDICINE

## 2022-03-03 PROCEDURE — 99284 EMERGENCY DEPT VISIT MOD MDM: CPT

## 2022-03-03 RX ORDER — BUSPIRONE HYDROCHLORIDE 15 MG/1
15 TABLET ORAL 2 TIMES DAILY
COMMUNITY

## 2022-03-03 RX ORDER — OXYCODONE HYDROCHLORIDE AND ACETAMINOPHEN 5; 325 MG/1; MG/1
1 TABLET ORAL ONCE
Status: COMPLETED | OUTPATIENT
Start: 2022-03-03 | End: 2022-03-03

## 2022-03-03 RX ADMIN — OXYCODONE AND ACETAMINOPHEN 1 TABLET: 5; 325 TABLET ORAL at 22:07

## 2022-03-03 ASSESSMENT — PAIN SCALES - GENERAL
PAINLEVEL_OUTOF10: 10
PAINLEVEL_OUTOF10: 7
PAINLEVEL_OUTOF10: 10

## 2022-03-03 ASSESSMENT — PAIN DESCRIPTION - LOCATION
LOCATION: KNEE
LOCATION: KNEE

## 2022-03-03 ASSESSMENT — PAIN DESCRIPTION - ORIENTATION
ORIENTATION: RIGHT
ORIENTATION: RIGHT

## 2022-03-03 ASSESSMENT — ENCOUNTER SYMPTOMS
ABDOMINAL PAIN: 0
SHORTNESS OF BREATH: 0
RHINORRHEA: 0
COUGH: 0
EYE PAIN: 0
VOMITING: 0
NAUSEA: 0
SORE THROAT: 0
EYE DISCHARGE: 0
BACK PAIN: 0

## 2022-03-03 ASSESSMENT — PAIN DESCRIPTION - PROGRESSION: CLINICAL_PROGRESSION: NOT CHANGED

## 2022-03-03 ASSESSMENT — PAIN DESCRIPTION - ONSET: ONSET: SUDDEN

## 2022-03-03 ASSESSMENT — PAIN DESCRIPTION - PAIN TYPE
TYPE: ACUTE PAIN
TYPE: ACUTE PAIN

## 2022-03-03 ASSESSMENT — PAIN DESCRIPTION - FREQUENCY
FREQUENCY: CONTINUOUS
FREQUENCY: CONTINUOUS

## 2022-03-03 ASSESSMENT — PAIN DESCRIPTION - DESCRIPTORS: DESCRIPTORS: SHARP

## 2022-03-04 NOTE — ED PROVIDER NOTES
[address]      Pt Name: Lauren Linda  MRN: [id number]  [place] 1975  Date of evaluation: 3/3/2022  Provider: Lit Zabala MD    CHIEF COMPLAINT       Chief Complaint   Patient presents with    Knee Injury     right knee injury. pt states fell off the 4th step of a ladder today. swelling and pain to right knee. HISTORY OF PRESENT ILLNESS      Lauren Linda is a 41 y.o. female who presents to the emergency department  for   Chief Complaint   Patient presents with    Knee Injury     right knee injury. pt states fell off the 4th step of a ladder today. swelling and pain to right knee. 61-year-old female presents with right knee pain. She reports she was painting her home earlier today when she fell and landed on her right knee. Denies any head or neck injury. No other injuries. No loss of consciousness. She is not anticoagulated. She is amnestic about events. She continues impressive pain in her right knee. She does endorse a history of a \"cyst\" behind her right knee. She reports that that is being followed outpatient. She is not have any calf pain. She is ambulatory but with pain. She has tried some anti-inflammatory medication at home with equally relief of symptoms. GCS of 15 in the emergency department. She is grossly moving all extremities spontaneously. Nursing Notes, Triage Notes & Vital Signs were reviewed. REVIEW OF SYSTEMS    (2-9 systems for level 4, 10 or more for level 5)     Review of Systems   Constitutional: Negative for chills and fever. HENT: Negative for congestion, rhinorrhea and sore throat. Eyes: Negative for pain and discharge. Respiratory: Negative for cough and shortness of breath. Cardiovascular: Negative for chest pain and palpitations. Gastrointestinal: Negative for abdominal pain, nausea and vomiting. Endocrine: Negative for polydipsia and polyuria. Genitourinary: Negative for dysuria and flank pain. Musculoskeletal: Negative for back pain and neck pain. Right knee pain   Skin: Negative for pallor and wound. Neurological: Negative for light-headedness and headaches. Psychiatric/Behavioral: Negative for confusion. Except as noted above the remainder of the review of systems was reviewed and negative. PAST MEDICAL HISTORY     Past Medical History:   Diagnosis Date    Anxiety     Anxiety     Depression     Headache     migraines    Palpitation     Thyroid disease        Prior to Admission medications    Medication Sig Start Date End Date Taking? Authorizing Provider   busPIRone (BUSPAR) 15 MG tablet Take 15 mg by mouth in the morning and at bedtime   Yes Historical Provider, MD   atorvastatin (LIPITOR) 20 MG tablet  3/16/21  Yes Historical Provider, MD   omeprazole (PRILOSEC) 40 MG delayed release capsule Take 40 mg by mouth daily 3/12/21  Yes Historical Provider, MD   escitalopram (LEXAPRO) 20 MG tablet Take 20 mg by mouth daily 3/17/21  Yes Historical Provider, MD   clonazePAM (KLONOPIN) 0.5 MG tablet Take 1 tablet by mouth 2 times daily as needed for Anxiety for up to 3 days.  10/13/20 6/9/23 Yes Jaleesa Calderon DO   acetaminophen (AMINOFEN) 325 MG tablet Take 2 tablets by mouth every 6 hours as needed for Pain 11/28/21   Eddie Lei MD   metoclopramide (REGLAN) 10 MG tablet Take 1 tablet by mouth 3 times daily as needed (nausea and vomiting) 11/28/21   Eddie Lei MD   ondansetron (ZOFRAN ODT) 4 MG disintegrating tablet Take 1 tablet by mouth every 8 hours as needed for Nausea 9/2/21   Jaimie Esquivel MD        Patient Active Problem List   Diagnosis    TIA (transient ischemic attack)    Paresthesia of tongue    Migraine headache without aura    Complicated migraine    Varicose veins of lower extremities with complications, right    Status post phlebectomy         SURGICAL HISTORY       Past Surgical History: Procedure Laterality Date    BREAST REDUCTION SURGERY      HYSTERECTOMY      THYROIDECTOMY, PARTIAL      1992    TYMPANOSTOMY TUBE PLACEMENT           CURRENT MEDICATIONS       Discharge Medication List as of 3/3/2022 10:10 PM      CONTINUE these medications which have NOT CHANGED    Details   busPIRone (BUSPAR) 15 MG tablet Take 15 mg by mouth in the morning and at bedtimeHistorical Med      atorvastatin (LIPITOR) 20 MG tablet Historical Med      omeprazole (PRILOSEC) 40 MG delayed release capsule Take 40 mg by mouth dailyHistorical Med      escitalopram (LEXAPRO) 20 MG tablet Take 20 mg by mouth dailyHistorical Med      clonazePAM (KLONOPIN) 0.5 MG tablet Take 1 tablet by mouth 2 times daily as needed for Anxiety for up to 3 days. , Disp-6 tablet, R-0Print      acetaminophen (AMINOFEN) 325 MG tablet Take 2 tablets by mouth every 6 hours as needed for Pain, Disp-120 tablet, R-0Normal      metoclopramide (REGLAN) 10 MG tablet Take 1 tablet by mouth 3 times daily as needed (nausea and vomiting), Disp-30 tablet, R-0Normal      ondansetron (ZOFRAN ODT) 4 MG disintegrating tablet Take 1 tablet by mouth every 8 hours as needed for Nausea, Disp-20 tablet, R-0Normal             ALLERGIES     Benadryl [diphenhydramine] and Nsaids    FAMILY HISTORY       Family History   Problem Relation Age of Onset    Cancer Mother     Heart Disease Mother     Cancer Father     Heart Disease Father           SOCIAL HISTORY       Social History     Socioeconomic History    Marital status:      Spouse name: None    Number of children: None    Years of education: None    Highest education level: None   Occupational History    None   Tobacco Use    Smoking status: Never Smoker    Smokeless tobacco: Never Used   Vaping Use    Vaping Use: Never used   Substance and Sexual Activity    Alcohol use: No    Drug use: No    Sexual activity: Yes     Partners: Male   Other Topics Concern    None   Social History Narrative    None     Social Determinants of Health     Financial Resource Strain:     Difficulty of Paying Living Expenses: Not on file   Food Insecurity:     Worried About Running Out of Food in the Last Year: Not on file    Hyacinth of Food in the Last Year: Not on file   Transportation Needs:     Lack of Transportation (Medical): Not on file    Lack of Transportation (Non-Medical): Not on file   Physical Activity:     Days of Exercise per Week: Not on file    Minutes of Exercise per Session: Not on file   Stress:     Feeling of Stress : Not on file   Social Connections:     Frequency of Communication with Friends and Family: Not on file    Frequency of Social Gatherings with Friends and Family: Not on file    Attends Worship Services: Not on file    Active Member of [address] [place] [address] [employer] or Organizations: Not on file    Attends Club or Organization Meetings: Not on file    Marital Status: Not on file   Intimate Partner Violence:     Fear of Current or Ex-Partner: Not on file    Emotionally Abused: Not on file    Physically Abused: Not on file    Sexually Abused: Not on file   Housing Stability:     Unable to Pay for Housing in the Last Year: Not on file    Number of [place] in the Last Year: Not on file    Unstable Housing in the Last Year: Not on file       SCREENINGS    [place] Coma Scale  Eye Opening: Spontaneous  Best Verbal Response: Oriented  Best Motor Response: Obeys commands  [place] Coma Scale Score: 15          PHYSICAL EXAM    (up to 7 for level 4, 8 or more for level 5)     ED Triage Vitals [03/03/22 2105]   BP Temp Temp Source Pulse Resp SpO2 Height Weight   137/74 97 °F (36.1 °C) Oral 79 18 98 % 5' (1.524 m) 230 lb (104.3 kg)       Physical Exam  Vitals reviewed. Constitutional:       Appearance: She is not ill-appearing or toxic-appearing. HENT:      Head: Normocephalic and atraumatic. Nose: No congestion or rhinorrhea.       Mouth/Throat:      Mouth: Mucous membranes are moist. Pharynx: No oropharyngeal exudate or posterior oropharyngeal erythema. Eyes:      General:         Right eye: No discharge. Left eye: No discharge. Extraocular Movements: Extraocular movements intact. Pupils: Pupils are equal, round, and reactive to light. Cardiovascular:      Rate and Rhythm: Normal rate. Pulses: Normal pulses. Heart sounds: No friction rub. No gallop. Pulmonary:      Effort: Pulmonary effort is normal. No respiratory distress. Chest:      Chest wall: No tenderness. Abdominal:      Palpations: Abdomen is soft. Tenderness: There is no abdominal tenderness. There is no guarding. Musculoskeletal:         General: Tenderness present. Cervical back: Normal range of motion and neck supple. No tenderness. Comments: Tenderness at medial knee; no significant swellinig noted; no excess laxity; no obvious deformity at right knee  2+ dp/pt pulses in RLE; no calf tenderness or swelling   Lymphadenopathy:      Cervical: No cervical adenopathy. Skin:     General: Skin is warm. Capillary Refill: Capillary refill takes less than 2 seconds. Findings: No erythema or lesion. Neurological:      General: No focal deficit present. Mental Status: She is alert and oriented to person, place, and time. DIAGNOSTIC RESULTS     Labs Reviewed - No data to display     RADIOLOGY:     Non-plain film images such as CT, Ultrasound and MRI are read by the radiologist. Plain radiographic images are visualized and preliminarily interpreted by the emergency physician. Interpretation per the Radiologist below, if available at the time of this note:    XR KNEE RIGHT (3 VIEWS)   Final Result   No acute abnormality of the right knee.       Moderate suprapatellar joint effusion               ED BEDSIDE ULTRASOUND:   Performed by ED Physician Joe Messina MD       LABS:  Labs Reviewed - No data to display    All other labs were within normal range or not returned as of this dictation. EMERGENCY DEPARTMENT COURSE and DIFFERENTIAL DIAGNOSIS/MDM:   Vitals:    Vitals:    03/03/22 2105   BP: 137/74   Pulse: 79   Resp: 18   Temp: 97 °F (36.1 °C)   TempSrc: Oral   SpO2: 98%   Weight: 230 lb (104.3 kg)   Height: 5' (1.524 m)           MDM  Number of Diagnoses or Management Options  Acute pain of right knee  Diagnosis management comments: 81-year-old female presents with right knee pain. She was painting in her home when she fell and struck the right knee. No other injuries noted. She has been ambulatory but dorsal some pain with ambulation. She presents clinical vital signs. No excess laxity right knee. No significant swelling or deformity noted. She does have some tenderness is worse at the medial knee. X-ray of the knee is obtained and is nonacute. Does not show any fracture. She treated with oral analgesics in the emergency department. She will continue symptomatic treatment at home. To follow-up with her primary care physician for the evaluation and management. She is discharged in stable condition. She is amatory without difficulty. She will follow up outpatient as needed       Amount and/or Complexity of Data Reviewed  Tests in the radiology section of CPT®: reviewed        -  Patient seen and evaluated in the emergency department. -  Triage and nursing notes reviewed and incorporated. -  Old chart records reviewed and incorporated. -  Work-up included:  See above  -  Results discussed with patient. CONSULTS:  None    PROCEDURES:  None performed unless otherwise noted below     Procedures        FINAL IMPRESSION      1.  Acute pain of right knee          DISPOSITION/PLAN   DISPOSITION Decision To Discharge 03/03/2022 10:09:59 PM      PATIENT REFERRED TO:  P[facility] [id number]  [phone]    Schedule an appointment as soon as possible for a visit in 1 week  As needed      DISCHARGE MEDICATIONS:  Discharge Medication List as of 3/3/2022 10:10 PM          ED Provider Disposition Time  DISPOSITION Decision To Discharge 03/03/2022 10:09:59 PM      Appropriate personal protective equipment was worn during the patient's evaluation. These included surgical, eye protection, surgical mask or in 95 respirator and gloves. The patient was also placed in a surgical mask for the prevention of possible spread of respiratory viral illnesses. The Patient was instructed to read the package inserts with any medication that was prescribed. Major potential reactions and medication interactions were discussed. The Patient understands that there are numerous possible adverse reactions not covered. The patient was also instructed to arrange follow-up with his or her primary care provider for review of any pending labwork or incidental findings on any radiology results that were obtained. All efforts were made to discuss any incidental findings that require further monitoring. Controlled Substances Monitoring:     No flowsheet data found.     (Please note that portions of this note were completed with a voice recognition program.  Efforts were made to edit the dictations but occasionally words are mis-transcribed.)    Elsa Johnson MD (electronically signed)  Attending Emergency Physician           Elsa Johnson MD  03/03/22 4741

## 2022-03-04 NOTE — ED TRIAGE NOTES
Pt to ED via private auto with c/o right knee injury. Pt fell off the 4th step of a ladder today. C/o swelling and pain to right knee with numbness in right foot. Pt states a hx of a cyst behind right knee. Pt alert, oriented x 4.  RR even, unlabored. Skin pink/warm/dry. Ambulatory with a limp. Pt drove self to ED.

## 2022-03-09 ENCOUNTER — OFFICE VISIT (OUTPATIENT)
Dept: ORTHOPEDIC SURGERY | Age: 47
End: 2022-03-09
Payer: COMMERCIAL

## 2022-03-09 VITALS
OXYGEN SATURATION: 97 % | RESPIRATION RATE: 16 BRPM | WEIGHT: 230 LBS | HEART RATE: 73 BPM | HEIGHT: 60 IN | BODY MASS INDEX: 45.16 KG/M2

## 2022-03-09 DIAGNOSIS — M17.11 PRIMARY OSTEOARTHRITIS OF RIGHT KNEE: Primary | ICD-10-CM

## 2022-03-09 PROCEDURE — G8484 FLU IMMUNIZE NO ADMIN: HCPCS | Performed by: PHYSICIAN ASSISTANT

## 2022-03-09 PROCEDURE — 1036F TOBACCO NON-USER: CPT | Performed by: PHYSICIAN ASSISTANT

## 2022-03-09 PROCEDURE — 20610 DRAIN/INJ JOINT/BURSA W/O US: CPT | Performed by: PHYSICIAN ASSISTANT

## 2022-03-09 PROCEDURE — G8417 CALC BMI ABV UP PARAM F/U: HCPCS | Performed by: PHYSICIAN ASSISTANT

## 2022-03-09 PROCEDURE — 99203 OFFICE O/P NEW LOW 30 MIN: CPT | Performed by: PHYSICIAN ASSISTANT

## 2022-03-09 PROCEDURE — G8427 DOCREV CUR MEDS BY ELIG CLIN: HCPCS | Performed by: PHYSICIAN ASSISTANT

## 2022-03-09 ASSESSMENT — ENCOUNTER SYMPTOMS
EYES NEGATIVE: 1
GASTROINTESTINAL NEGATIVE: 1
RESPIRATORY NEGATIVE: 1

## 2022-03-09 NOTE — PROGRESS NOTES
Patient is a 41year old female. Patient is in the office today with right knee pain. Patient has been taking Tylenol and Ibuprofen. Patient states that this pain has been going on for over a year, but the last 2-3 days she fell off of a ladder and might have injured the knee. Patient states that the posterior part of the knee feels like it is going to \"burst\" out.

## 2022-03-09 NOTE — PROGRESS NOTES
Review of Systems   Constitutional: Negative. HENT: Negative. Eyes: Negative. Respiratory: Negative. Cardiovascular: Negative. Gastrointestinal: Negative. Genitourinary: Negative. Musculoskeletal: Positive for arthralgias and myalgias. Skin: Negative. Neurological: Negative. Psychiatric/Behavioral: Negative. HPI:  Bhavana Gatica is a 41 y.o. female that presents the office today complaining of right knee pain has been going on for the last year. She has seen another orthopedic provider outside of this office but has not had a steroid injection or any further treatment. She states she is having trouble bending the knee all the way back and fully straightening the knee. Most of her pain is along the inner half of the knee and some along the top of the patella. She has not had any recent injuries that she can think of. She rates her pain today at a 7/10, worse with prolonged weightbearing.     Past Medical History:   Diagnosis Date    Anxiety     Anxiety     Depression     Headache     migraines    Palpitation     Thyroid disease        Past Surgical History:   Procedure Laterality Date    BREAST REDUCTION SURGERY      HYSTERECTOMY      THYROIDECTOMY, PARTIAL      1992    TYMPANOSTOMY TUBE PLACEMENT         Family History   Problem Relation Age of Onset    Cancer Mother     Heart Disease Mother     Cancer Father     Heart Disease Father        Social History     Socioeconomic History    Marital status:      Spouse name: None    Number of children: None    Years of education: None    Highest education level: None   Occupational History    None   Tobacco Use    Smoking status: Never Smoker    Smokeless tobacco: Never Used   Vaping Use    Vaping Use: Never used   Substance and Sexual Activity    Alcohol use: No    Drug use: No    Sexual activity: Yes     Partners: Male   Other Topics Concern    None   Social History Narrative    None     Social Determinants of Health     Financial Resource Strain:     Difficulty of Paying Living Expenses: Not on file   Food Insecurity:     Worried About Running Out of Food in the Last Year: Not on file    Hyacinth of Food in the Last Year: Not on file   Transportation Needs:     Lack of Transportation (Medical): Not on file    Lack of Transportation (Non-Medical): Not on file   Physical Activity:     Days of Exercise per Week: Not on file    Minutes of Exercise per Session: Not on file   Stress:     Feeling of Stress : Not on file   Social Connections:     Frequency of Communication with Friends and Family: Not on file    Frequency of Social Gatherings with Friends and Family: Not on file    Attends Sabianist Services: Not on file    Active Member of [address] [place] [address] [employer] or Organizations: Not on file    Attends Club or Organization Meetings: Not on file    Marital Status: Not on file   Intimate Partner Violence:     Fear of Current or Ex-Partner: Not on file    Emotionally Abused: Not on file    Physically Abused: Not on file    Sexually Abused: Not on file   Housing Stability:     Unable to Pay for Housing in the Last Year: Not on file    Number of [place] in the Last Year: Not on file    Unstable Housing in the Last Year: Not on file       Current Outpatient Medications   Medication Sig Dispense Refill    busPIRone (BUSPAR) 15 MG tablet Take 15 mg by mouth in the morning and at bedtime      acetaminophen (AMINOFEN) 325 MG tablet Take 2 tablets by mouth every 6 hours as needed for Pain 120 tablet 0    atorvastatin (LIPITOR) 20 MG tablet       omeprazole (PRILOSEC) 40 MG delayed release capsule Take 40 mg by mouth daily      escitalopram (LEXAPRO) 20 MG tablet Take 20 mg by mouth daily      clonazePAM (KLONOPIN) 0.5 MG tablet Take 1 tablet by mouth 2 times daily as needed for Anxiety for up to 3 days.  6 tablet 0    metoclopramide (REGLAN) 10 MG tablet Take 1 tablet by mouth 3 times daily as needed (nausea and vomiting) 30 tablet 0    ondansetron (ZOFRAN ODT) 4 MG disintegrating tablet Take 1 tablet by mouth every 8 hours as needed for Nausea 20 tablet 0     No current facility-administered medications for this visit. Allergies   Allergen Reactions    Cyclobenzaprine      anger    Prednisone      Rapid heart rate    Benadryl [Diphenhydramine] Anxiety     Severe anxiety and agitation. States can take it oral but not IV    Nsaids Itching     Pt states she can tolerate toradol as of 12/1/21       Review of Systems:  See above      Physical Exam:   Resp 16   Ht 5' (1.524 m)   Wt 230 lb (104.3 kg)   BMI 44.92 kg/m²        Gait is Antalgic. The patient can bear weight on the injured extremity. Gen/Psych: Examinationreveals a pleasant individual in no acute distress. The patient is oriented to time, place and person. The patient's mood and affect are appropriate.     Lymph: The lymphatic examination bilaterally reveals allareas to be without enlargement or induration.      Skin intact without lymphadenopathy, discoloration, or abnormal temperature.      Vascular: There is intact, symmetric circulation in both lowerextremities. right leg/knee exam:  Leg alignment:     neutral  Quadriceps/hamstring atrophy:   no  Knee effusion:    mild   Knee erythema:   no  ROM:     2-100°    Increased ER at 30 deg's:  no  Varus laxity at 0 and 30 deg's: no  Valguslaxity at 0 and 30 deg's: no  Recurvatum:    no  Tenderness at:   Medial joint line, popliteal fossa  Knee strength is 5/5 flexion and extension  There is + L2-S1 motor and sensory function. Outside record review: MRI report reviewed from [facility] back in October 2021. Imaging studies:  4 views of the right knee taken and reviewed in the office today show mild degenerative changes affecting the medial compartment of the right knee with intact space laterally and within the patellofemoral compartment. No evidence of any fractures, no dislocations.   The official read and interpretation of these x-rays will be done by the the [facility]       Impression:     Diagnosis Orders   1. Primary osteoarthritis of right knee  [place] ARTHROCENTESIS ASPIR&/INJ MAJOR JT/BURSA W/O US           Plan:     Injection given in right knee  Work on range of motion exercises and stretching exercises of the right leg. Okay to use topical joint rub such as Voltaren gel or Biofreeze as needed  Follow-up in 4 to 6 weeks for reevaluation of treatment. Right knee injection procedure note    Pre-procedure diagnosis:  Right knee DJD    Post-procedure diagnosis:  same    Procedure: The planned procedure/risks/benefits/alternatives were discussed with the patient. Risks include, but are not limited to, increased pain, drug reaction, infection, bleeding, lack of improvement, neurovascular injury, and increased blood sugar levels. The patient understood and all of their questions were answered. The right knee was prepped with alcohol, then a 22 gauge needle was advanced into the inferior-medial joint without difficulty. The joint was then injected with 1 ml 1% lidocaine, 1ml of Kenalog (40 mg/ml), 1ml of 0.5% bupivacaine. The injection site was cleansed with isopropyl alcohol and a band-aid was placed. Complications:  None, the patient tolerated the procedure well. Instructions: The patient was advised to rest the knee and decrease activity for the next 24 to 48 hours. May use prescription or OTC pain relievers as needed for any post-injection pain as well as ice.

## 2022-03-09 NOTE — PATIENT INSTRUCTIONS
Continue to weight bear as tolerated  Continue range of motion  Ice and elevate as needed  Tylenol or Motrin for pain  Injection given today in the office   Rest for 24-48 hours  Follow up in 6 weeks    Patient Education        Knee Arthritis: Exercises  Introduction  Here are some examples of exercises for you to try. The exercises may be suggested for a condition or for rehabilitation. Start each exercise slowly. Ease off the exercises if you start to have pain. You will be told when to start these exercises and which ones will work best for you. How to do the exercises  Knee flexion with heel slide    1. Lie on your back with your knees bent. 2. Slide your heel back by bending your affected knee as far as you can. Then hook your other foot around your ankle to help pull your heel even farther back. 3. Hold for about 6 seconds, then rest for up to 10 seconds. 4. Repeat 8 to 12 times. 5. Switch legs and repeat steps 1 through 4, even if only one knee is sore. Quad sets    1. Sit with your affected leg straight and supported on the floor or a firm bed. Place a small, rolled-up towel under your knee. Your other leg should be bent, with that foot flat on the floor. 2. Tighten the thigh muscles of your affected leg by pressing the back of your knee down into the towel. 3. Hold for about 6 seconds, then rest for up to 10 seconds. 4. Repeat 8 to 12 times. 5. Switch legs and repeat steps 1 through 4, even if only one knee is sore. Straight-leg raises to the front    1. Lie on your back with your good knee bent so that your foot rests flat on the floor. Your affected leg should be straight. Make sure that your low back has a normal curve. You should be able to slip your hand in between the floor and the small of your back, with your palm touching the floor and your back touching the back of your hand. 2. Tighten the thigh muscles in your affected leg by pressing the back of your knee flat down to the floor. Hold your knee straight. 3. Keeping the thigh muscles tight and your leg straight, lift your affected leg up so that your heel is about 12 inches off the floor. Hold for about 6 seconds, then lower slowly. 4. Relax for up to 10 seconds between repetitions. 5. Repeat 8 to 12 times. 6. Switch legs and repeat steps 1 through 5, even if only one knee is sore. Active knee flexion    1. Lie on your stomach with your knees straight. If your kneecap is uncomfortable, roll up a washcloth and put it under your leg just above your kneecap. 2. Lift the foot of your affected leg by bending the knee so that you bring the foot up toward your buttock. If this motion hurts, try it without bending your knee quite as far. This may help you avoid any painful motion. 3. Slowly move your leg up and down. 4. Repeat 8 to 12 times. 5. Switch legs and repeat steps 1 through 4, even if only one knee is sore. Quadriceps stretch (facedown)    1. Lie flat on your stomach, and rest your face on the floor. 2. Wrap a towel or belt strap around the lower part of your affected leg. Then use the towel or belt strap to slowly pull your heel toward your buttock until you feel a stretch. 3. Hold for about 15 to 30 seconds, then relax your leg against the towel or belt strap. 4. Repeat 2 to 4 times. 5. Switch legs and repeat steps 1 through 4, even if only one knee is sore. Stationary exercise bike    1. If you do not have a stationary exercise bike at home, you can find one to ride at your local health club or community center. 2. Adjust the height of the bike seat so that your knee is slightly bent when your leg is extended downward. If your knee hurts when the pedal reaches the top, you can raise the seat so that your knee does not bend as much. 3. Start slowly. At first, try to do 5 to 10 minutes of cycling with little to no resistance.  Then increase your time and the resistance bit by bit until you can do 20 to 30 minutes without pain.  4. If you start to have pain, rest your knee until your pain gets back to the level that is normal for you. Or cycle for less time or with less effort. Follow-up care is a key part of your treatment and safety. Be sure to make and go to all appointments, and call your doctor if you are having problems. It's also a good idea to know your test results and keep a list of the medicines you take. Where can you learn more? Go to [email or web address] and sign in to your [employer] account. Enter C159 in the [employer] box to learn more about \"Knee Arthritis: Exercises. \"     If you do not have an account, please click on the \"Sign Up Now\" link. Current as of: July 1, 2021               Content Version: 13.1  © 3916-4417 [employer]wise, Incorporated. Care instructions adapted under license by [facility] ([facility]). If you have questions about a medical condition or this instruction, always ask your healthcare professional. [place] [address] any warranty or liability for your use of this information.

## 2022-03-13 ENCOUNTER — PATIENT MESSAGE (OUTPATIENT)
Dept: ORTHOPEDIC SURGERY | Age: 47
End: 2022-03-13

## 2022-03-13 DIAGNOSIS — M17.11 PRIMARY OSTEOARTHRITIS OF RIGHT KNEE: Primary | ICD-10-CM

## 2022-03-13 DIAGNOSIS — M25.361 KNEE INSTABILITY, RIGHT: ICD-10-CM

## 2022-03-14 RX ORDER — TRAMADOL HYDROCHLORIDE 50 MG/1
50 TABLET ORAL EVERY 6 HOURS PRN
Qty: 28 TABLET | Refills: 0 | Status: SHIPPED | OUTPATIENT
Start: 2022-03-14 | End: 2022-03-21

## 2022-03-14 NOTE — TELEPHONE ENCOUNTER
Patient called checking on the status of receiving a response to her [employer] message. Please advise.

## 2022-03-23 ENCOUNTER — HOSPITAL ENCOUNTER (OUTPATIENT)
Dept: MRI IMAGING | Age: 47
Discharge: HOME | End: 2022-03-23
Payer: COMMERCIAL

## 2022-03-23 DIAGNOSIS — M25.361 KNEE INSTABILITY, RIGHT: ICD-10-CM

## 2022-03-23 PROCEDURE — 73721 MRI JNT OF LWR EXTRE W/O DYE: CPT

## 2022-03-28 ENCOUNTER — OFFICE VISIT (OUTPATIENT)
Dept: ORTHOPEDIC SURGERY | Age: 47
End: 2022-03-28
Payer: COMMERCIAL

## 2022-03-28 VITALS
HEIGHT: 60 IN | RESPIRATION RATE: 16 BRPM | BODY MASS INDEX: 45.16 KG/M2 | OXYGEN SATURATION: 97 % | HEART RATE: 87 BPM | WEIGHT: 230 LBS

## 2022-03-28 DIAGNOSIS — M25.361 PATELLAR INSTABILITY OF RIGHT KNEE: Primary | ICD-10-CM

## 2022-03-28 PROCEDURE — G8417 CALC BMI ABV UP PARAM F/U: HCPCS | Performed by: PHYSICIAN ASSISTANT

## 2022-03-28 PROCEDURE — G8427 DOCREV CUR MEDS BY ELIG CLIN: HCPCS | Performed by: PHYSICIAN ASSISTANT

## 2022-03-28 PROCEDURE — G8484 FLU IMMUNIZE NO ADMIN: HCPCS | Performed by: PHYSICIAN ASSISTANT

## 2022-03-28 PROCEDURE — 1036F TOBACCO NON-USER: CPT | Performed by: PHYSICIAN ASSISTANT

## 2022-03-28 PROCEDURE — 99212 OFFICE O/P EST SF 10 MIN: CPT | Performed by: PHYSICIAN ASSISTANT

## 2022-03-28 NOTE — PROGRESS NOTES
Review of Systems   Constitutional: Negative. HENT: Negative. Eyes: Negative. Respiratory: Negative. Cardiovascular: Negative. Gastrointestinal: Negative. Genitourinary: Negative. Musculoskeletal: Positive for arthralgias and myalgias. Skin: Negative. Neurological: Negative. Psychiatric/Behavioral: Negative. HPI:  Tracey Pittman is a 41 y.o. female that returns to the office today to go over MRI of her right knee. She continues to have a feeling of instability in the right knee as if the patella wants to go out of place. She states that she has been dealing with this for over a year. Her initial issue started when her dog ran into her knee and then more recently when she was coming down a ladder and missed a step. She states that the knee just feels \"sloppy\".     Past Medical History:   Diagnosis Date    Anxiety     Anxiety     Depression     Headache     migraines    Palpitation     Thyroid disease        Past Surgical History:   Procedure Laterality Date    BREAST REDUCTION SURGERY      HYSTERECTOMY      THYROIDECTOMY, PARTIAL      1992    TYMPANOSTOMY TUBE PLACEMENT         Family History   Problem Relation Age of Onset    Cancer Mother     Heart Disease Mother     Cancer Father     Heart Disease Father        Social History     Socioeconomic History    Marital status:      Spouse name: None    Number of children: None    Years of education: None    Highest education level: None   Occupational History    None   Tobacco Use    Smoking status: Never Smoker    Smokeless tobacco: Never Used   Vaping Use    Vaping Use: Never used   Substance and Sexual Activity    Alcohol use: No    Drug use: No    Sexual activity: Yes     Partners: Male   Other Topics Concern    None   Social History Narrative    None     Social Determinants of Health     Financial Resource Strain:     Difficulty of Paying Living Expenses: Not on file   Food Insecurity:     Worried About [facility] in the Last Year: Not on file    [place]acinth of Food in the Last Year: Not on file   Transportation Needs:     Lack of Transportation (Medical): Not on file    Lack of Transportation (Non-Medical): Not on file   Physical Activity:     Days of Exercise per Week: Not on file    Minutes of Exercise per Session: Not on file   Stress:     Feeling of Stress : Not on file   Social Connections:     Frequency of Communication with Friends and Family: Not on file    Frequency of Social Gatherings with Friends and Family: Not on file    Attends Religion Services: Not on file    Active Member of [address] [place] [address] or Organizations: Not on file    Attends Club or Organization Meetings: Not on file    Marital Status: Not on file   Intimate Partner Violence:     Fear of Current or Ex-Partner: Not on file    Emotionally Abused: Not on file    Physically Abused: Not on file    Sexually Abused: Not on file   Housing Stability:     Unable to Pay for Housing in the Last Year: Not on file    Number of [place] in the Last Year: Not on file    Unstable Housing in the Last Year: Not on file       Current Outpatient Medications   Medication Sig Dispense Refill    busPIRone (BUSPAR) 15 MG tablet Take 15 mg by mouth in the morning and at bedtime      acetaminophen (AMINOFEN) 325 MG tablet Take 2 tablets by mouth every 6 hours as needed for Pain 120 tablet 0    atorvastatin (LIPITOR) 20 MG tablet       omeprazole (PRILOSEC) 40 MG delayed release capsule Take 40 mg by mouth daily      escitalopram (LEXAPRO) 20 MG tablet Take 20 mg by mouth daily      clonazePAM (KLONOPIN) 0.5 MG tablet Take 1 tablet by mouth 2 times daily as needed for Anxiety for up to 3 days.  6 tablet 0    metoclopramide (REGLAN) 10 MG tablet Take 1 tablet by mouth 3 times daily as needed (nausea and vomiting) 30 tablet 0    ondansetron (ZOFRAN ODT) 4 MG disintegrating tablet Take 1 tablet by mouth every 8 hours as needed for Nausea 20 tablet 0     No current facility-administered medications for this visit. Allergies   Allergen Reactions    Cyclobenzaprine      anger    Prednisone      Rapid heart rate    Benadryl [Diphenhydramine] Anxiety     Severe anxiety and agitation. States can take it oral but not IV    Nsaids Itching     Pt states she can tolerate toradol as of 12/1/21       Review of Systems:  See above      Physical Exam:   Pulse 87   Resp 16   Ht 5' (1.524 m)   Wt 230 lb (104.3 kg)   SpO2 97%   BMI 44.92 kg/m²        Gait is Antalgic. The patient can bear weight on the injured extremity. Gen/Psych: Examinationreveals a pleasant individual in no acute distress. The patient is oriented to time, place and person. The patient's mood and affect are appropriate.     Lymph: The lymphatic examination bilaterally reveals allareas to be without enlargement or induration.      Skin intact without lymphadenopathy, discoloration, or abnormal temperature.      Vascular: There is intact, symmetric circulation in both lowerextremities. right leg/knee exam:  Leg alignment:     neutral  Quadriceps/hamstring atrophy:   no  Knee effusion:    mild   Knee erythema:   no  ROM:     2-100°    Increased ER at 30 deg's:  no  Varus laxity at 0 and 30 deg's: no  Valguslaxity at 0 and 30 deg's: no  Recurvatum:    no  Tenderness at:   Medial patellar retinaculum, lateral patella  Knee strength is 5/5 flexion and extension  There is + L2-S1 motor and sensory function. Outside record review: MRI report reviewed from [facility] back in October 2021. Imaging studies:  MRI right knee 3/23/2022  FINDINGS:   MENISCI: Medial and lateral menisci appear intact and normal in morphology. No parameniscal cysts are seen.       CRUCIATE LIGAMENTS: ACL and PCL appear intact and unremarkable.       EXTENSOR MECHANISM: Quadriceps and patellar tendons appear intact and   unremarkable.  Medial and lateral patellar retinacula appear intact.    However, there is mild thickening of the femoral aspect of the medial   patellar retinaculum/medial patellofemoral ligament with minimal intrinsic T2   hyperintensity and minimal surrounding edema.       LATERAL COLLATERAL LIGAMENT COMPLEX: Lateral collateral ligament complex   appears intact and unremarkable.  Popliteus tendon appears intact.       MEDIAL COLLATERAL LIGAMENT COMPLEX: Medial collateral ligament appears intact   but thickened, especially proximally.  No significant intrinsic T2   hyperintensity is seen but there is minimal surrounding edema.       KNEE JOINT: Small to moderate knee joint effusion.  No intra-articular loose   bodies are seen.  Small to moderate Baker's cyst.  Varix of the tibioperoneal   venous trunk.  Mild tricompartmental degenerative changes, most significant   to the medial compartment.  No focal full-thickness chondral defects or   osteochondral lesions.       BONE MARROW: No evidence for occult fracture.  No suspicious focal bony   lesions.           Impression   Subacute to chronic low-grade MCL sprain along with subacute to chronic   low-grade injury of the femoral aspect of medial patellar retinaculum/medial   patellofemoral ligament.       Mild tricompartmental degenerative changes to the knee.       Small to moderate knee joint effusion.       Small to moderate Baker's cyst.         Impression:     Diagnosis Orders   1.  Patellar instability of right knee         Plan:   Patient Instructions   Weightbearing and activities as tolerated in brace  May take Ibuprofen or Motrin as needed  Rest, ice, and elevate as needed  Follow up with Dr. Mable Veliz

## 2022-03-28 NOTE — PATIENT INSTRUCTIONS
Weightbearing and activities as tolerated in brace  May take Ibuprofen or Motrin as needed  Rest, ice, and elevate as needed  Follow up with Dr. Viola Moser

## 2022-04-04 ENCOUNTER — TELEPHONE (OUTPATIENT)
Dept: ORTHOPEDIC SURGERY | Age: 47
End: 2022-04-04

## 2022-04-13 ENCOUNTER — OFFICE VISIT (OUTPATIENT)
Dept: ORTHOPEDIC SURGERY | Age: 47
End: 2022-04-13
Payer: COMMERCIAL

## 2022-04-13 ENCOUNTER — TELEPHONE (OUTPATIENT)
Dept: ORTHOPEDIC SURGERY | Age: 47
End: 2022-04-13

## 2022-04-13 VITALS
BODY MASS INDEX: 45.16 KG/M2 | WEIGHT: 230 LBS | HEIGHT: 60 IN | RESPIRATION RATE: 18 BRPM | HEART RATE: 91 BPM | OXYGEN SATURATION: 99 %

## 2022-04-13 DIAGNOSIS — M22.41 CHONDROMALACIA, PATELLA, RIGHT: Primary | ICD-10-CM

## 2022-04-13 PROCEDURE — 1036F TOBACCO NON-USER: CPT | Performed by: ORTHOPAEDIC SURGERY

## 2022-04-13 PROCEDURE — 99203 OFFICE O/P NEW LOW 30 MIN: CPT | Performed by: ORTHOPAEDIC SURGERY

## 2022-04-13 PROCEDURE — G8427 DOCREV CUR MEDS BY ELIG CLIN: HCPCS | Performed by: ORTHOPAEDIC SURGERY

## 2022-04-13 PROCEDURE — G8417 CALC BMI ABV UP PARAM F/U: HCPCS | Performed by: ORTHOPAEDIC SURGERY

## 2022-04-13 RX ORDER — PANTOPRAZOLE SODIUM 40 MG/1
40 TABLET, DELAYED RELEASE ORAL DAILY
COMMUNITY
Start: 2022-01-18

## 2022-04-13 RX ORDER — ZOLPIDEM TARTRATE 5 MG/1
5 TABLET ORAL NIGHTLY
COMMUNITY
Start: 2022-04-12

## 2022-04-13 NOTE — PROGRESS NOTES
Patient returns to the office with right knee pain. Pt stated that this knee pain has been prevalent for about a year following a fall onto the knee. Pt stated that she has had increased instability and weakness in the knee causing her to fall often. Pt stated that she was given an injection early March that gave no relief. Pt has been wearing a knee immobilizer since the last visit and stated it helps with the instability. Pt stated often after prolonged standing or walking her knee will swell and she has been treating it with ice, rest and tramadol.

## 2022-04-13 NOTE — TELEPHONE ENCOUNTER
Pt called in stating that she forgot to ask for pain medication at her appt today and would like a refill

## 2022-04-14 ASSESSMENT — ENCOUNTER SYMPTOMS
EYE REDNESS: 0
BACK PAIN: 0
ABDOMINAL PAIN: 0
COLOR CHANGE: 0
CHEST TIGHTNESS: 0

## 2022-04-14 NOTE — PROGRESS NOTES
Subjective:      Patient ID: Ryann Graves is a 41 y.o. female. Patient returns to the office with right knee pain. Pt stated that this knee pain has been prevalent for about a year following a fall onto the knee. Pt stated that she has had increased instability and weakness in the knee causing her to fall often. Pt stated that she was given an injection early March that gave no relief. Pt has been wearing a knee immobilizer since the last visit and stated it helps with the instability. Pt stated often after prolonged standing or walking her knee will swell and she has been treating it with ice, rest and tramadol. She comes in today for her first visit with me in regards to her right knee pain. She states that over the past year since she fell she has continued having a deep, aching and grinding pain primarily along the anterior aspect of her right knee. She also continues to have persistent swelling in the right knee. She states that the pain has not gotten any better with the knee immobilizer. She states that she is not currently having much in the way of instability in the right patella. Patient denies any new injury to the involved extremity/ joint, denies numbness or tingling in the involved extremity and denies fever or chills. Review of Systems   Constitutional: Negative for activity change, chills and fever. HENT: Negative for congestion and drooling. Eyes: Negative for redness. Respiratory: Negative for chest tightness. Cardiovascular: Negative for chest pain. Gastrointestinal: Negative for abdominal pain. Endocrine: Negative for cold intolerance and heat intolerance. Musculoskeletal: Positive for arthralgias, gait problem, joint swelling and myalgias. Negative for back pain. Skin: Negative for color change, pallor, rash and wound. Neurological: Negative for weakness and numbness. Psychiatric/Behavioral: Negative for confusion.        Past Medical History:   Diagnosis Date    Anxiety     Anxiety     Depression     Headache     migraines    Palpitation     Thyroid disease        Objective:   Physical Exam  Constitutional:       Appearance: She is well-developed. HENT:      Head: Normocephalic. Eyes:      Pupils: Pupils are equal, round, and reactive to light. Pulmonary:      Effort: Pulmonary effort is normal.   Musculoskeletal:         General: Swelling and tenderness present. No deformity. Normal range of motion. Cervical back: Normal range of motion. Right hip: Normal.      Left hip: Normal.      Right knee: Bony tenderness and crepitus present. No swelling, deformity, effusion, erythema, ecchymosis or lacerations. Normal range of motion. Tenderness present over the patellar tendon. No medial joint line, lateral joint line, MCL or LCL tenderness. No LCL laxity or MCL laxity. Normal alignment and normal patellar mobility. Left knee: Normal. No swelling, deformity, effusion, erythema, ecchymosis, lacerations or bony tenderness. Normal range of motion. No tenderness. No medial joint line, lateral joint line, MCL, LCL or patellar tendon tenderness. No LCL laxity or MCL laxity. Normal alignment and normal patellar mobility. Skin:     General: Skin is warm and dry. Capillary Refill: Capillary refill takes less than 2 seconds. Coloration: Skin is not pale. Findings: No erythema or rash. Neurological:      Mental Status: She is alert and oriented to person, place, and time. Right knee-Skin intact with no erythema, ecchymosis or lacerations present. 0-120  Significant patellofemoral crepitus  Negative apprehension sign. XRAY  X-ray MRI of the right knee from March 23, 2022 reviewed by me today in the office demonstrates age appropriate bone density throughout with moderate lateral tracking of the patella, large knee effusion, significant cartilage loss under the patella, medial lateral meniscus intact, ACL PCL intact.       Assessment: Right knee patellofemoral syndrome  Right knee chondromalacia patella      Plan:      I discussed with her today her MRI findings. I explained to her that she does have maltracking in her patella as well as diffuse cartilage loss. At this point given her persistent symptoms and with her MRI findings I recommend surgical treatment. I discussed with her today performing right knee arthroscopy with chondroplasty and possible lateral release and unless her patella is unstable then we would just proceed with chondroplasty. I do not believe she is a candidate for MPFL reconstruction at this point. I explained risks, benefits, possible complications of the procedure and answered all questions for the patient. I explained postoperative rehabilitation protocol and expectations with the patient today. The patient understands and consents to the procedure. Patient will follow up with their primary care physician prior to surgical treatment for preoperative clearance. We will schedule surgery at soonest convenience. Continue weight-bearing as tolerated. Continue range of motion exercises as instructed. Ice and elevate as needed. Tylenol or Motrin for pain.   Follow up in 2 weeks postop            Irma Salmeron, DO

## 2022-04-30 ENCOUNTER — HOSPITAL ENCOUNTER (EMERGENCY)
Age: 47
Discharge: HOME | End: 2022-04-30
Attending: EMERGENCY MEDICINE
Payer: COMMERCIAL

## 2022-04-30 ENCOUNTER — APPOINTMENT (OUTPATIENT)
Dept: CT IMAGING | Age: 47
End: 2022-04-30
Payer: COMMERCIAL

## 2022-04-30 VITALS
TEMPERATURE: 98.5 F | HEART RATE: 72 BPM | OXYGEN SATURATION: 100 % | DIASTOLIC BLOOD PRESSURE: 68 MMHG | SYSTOLIC BLOOD PRESSURE: 135 MMHG | RESPIRATION RATE: 18 BRPM

## 2022-04-30 DIAGNOSIS — R10.12 LEFT UPPER QUADRANT ABDOMINAL PAIN: Primary | ICD-10-CM

## 2022-04-30 LAB
ALBUMIN SERPL-MCNC: 4.5 GM/DL (ref 3.4–5)
ALP BLD-CCNC: 93 IU/L (ref 40–129)
ALT SERPL-CCNC: 6 U/L (ref 10–40)
ANION GAP SERPL CALCULATED.3IONS-SCNC: 13 MMOL/L (ref 4–16)
AST SERPL-CCNC: 26 IU/L (ref 15–37)
BACTERIA: ABNORMAL /HPF
BASOPHILS ABSOLUTE: 0.1 K/CU MM
BASOPHILS RELATIVE PERCENT: 0.7 % (ref 0–1)
BILIRUB SERPL-MCNC: 1 MG/DL (ref 0–1)
BILIRUBIN URINE: NEGATIVE MG/DL
BLOOD, URINE: NEGATIVE
BUN BLDV-MCNC: 15 MG/DL (ref 6–23)
CALCIUM SERPL-MCNC: 9.5 MG/DL (ref 8.3–10.6)
CAST TYPE: NEGATIVE /HPF
CHLORIDE BLD-SCNC: 101 MMOL/L (ref 99–110)
CLARITY: ABNORMAL
CO2: 25 MMOL/L (ref 21–32)
COLOR: YELLOW
CREAT SERPL-MCNC: 0.8 MG/DL (ref 0.6–1.1)
CRYSTAL TYPE: NEGATIVE /HPF
DIFFERENTIAL TYPE: ABNORMAL
EOSINOPHILS ABSOLUTE: 0.2 K/CU MM
EOSINOPHILS RELATIVE PERCENT: 2.4 % (ref 0–3)
EPITHELIAL CELLS, UA: ABNORMAL /HPF
GFR AFRICAN AMERICAN: >60 ML/MIN/1.73M2
GFR NON-AFRICAN AMERICAN: >60 ML/MIN/1.73M2
GLUCOSE BLD-MCNC: 88 MG/DL (ref 70–99)
GLUCOSE, URINE: NEGATIVE MG/DL
HCT VFR BLD CALC: 44 % (ref 37–47)
HEMOGLOBIN: 13.5 GM/DL (ref 12.5–16)
IMMATURE NEUTROPHIL %: 0.5 % (ref 0–0.43)
KETONES, URINE: NEGATIVE MG/DL
LEUKOCYTE ESTERASE, URINE: NEGATIVE
LIPASE: 31 IU/L (ref 13–60)
LYMPHOCYTES ABSOLUTE: 2 K/CU MM
LYMPHOCYTES RELATIVE PERCENT: 24.1 % (ref 24–44)
MCH RBC QN AUTO: 28.8 PG (ref 27–31)
MCHC RBC AUTO-ENTMCNC: 30.7 % (ref 32–36)
MCV RBC AUTO: 93.8 FL (ref 78–100)
MONOCYTES ABSOLUTE: 0.4 K/CU MM
MONOCYTES RELATIVE PERCENT: 5.4 % (ref 0–4)
NITRITE URINE, QUANTITATIVE: NEGATIVE
PDW BLD-RTO: 13 % (ref 11.7–14.9)
PH, URINE: 6 (ref 5–8)
PLATELET # BLD: 272 K/CU MM (ref 140–440)
PMV BLD AUTO: 10.8 FL (ref 7.5–11.1)
POTASSIUM SERPL-SCNC: 4.6 MMOL/L (ref 3.5–5.1)
PROTEIN UA: NEGATIVE MG/DL
RBC # BLD: 4.69 M/CU MM (ref 4.2–5.4)
RBC URINE: NEGATIVE /HPF (ref 0–6)
SEGMENTED NEUTROPHILS ABSOLUTE COUNT: 5.5 K/CU MM
SEGMENTED NEUTROPHILS RELATIVE PERCENT: 66.9 % (ref 36–66)
SODIUM BLD-SCNC: 139 MMOL/L (ref 135–145)
SPECIFIC GRAVITY UA: 1.02 (ref 1–1.03)
TOTAL IMMATURE NEUTOROPHIL: 0.04 K/CU MM
TOTAL PROTEIN: 7.1 GM/DL (ref 6.4–8.2)
TROPONIN T: <0.01 NG/ML
UROBILINOGEN, URINE: 0.2 MG/DL (ref 0.2–1)
WBC # BLD: 8.2 K/CU MM (ref 4–10.5)
WBC UA: NEGATIVE /HPF (ref 0–5)

## 2022-04-30 PROCEDURE — 96375 TX/PRO/DX INJ NEW DRUG ADDON: CPT

## 2022-04-30 PROCEDURE — 81001 URINALYSIS AUTO W/SCOPE: CPT

## 2022-04-30 PROCEDURE — 85025 COMPLETE CBC W/AUTO DIFF WBC: CPT

## 2022-04-30 PROCEDURE — 93005 ELECTROCARDIOGRAM TRACING: CPT | Performed by: EMERGENCY MEDICINE

## 2022-04-30 PROCEDURE — 83690 ASSAY OF LIPASE: CPT

## 2022-04-30 PROCEDURE — 96374 THER/PROPH/DIAG INJ IV PUSH: CPT

## 2022-04-30 PROCEDURE — 84484 ASSAY OF TROPONIN QUANT: CPT

## 2022-04-30 PROCEDURE — 99285 EMERGENCY DEPT VISIT HI MDM: CPT

## 2022-04-30 PROCEDURE — 74177 CT ABD & PELVIS W/CONTRAST: CPT

## 2022-04-30 PROCEDURE — 6360000002 HC RX W HCPCS: Performed by: EMERGENCY MEDICINE

## 2022-04-30 PROCEDURE — 80053 COMPREHEN METABOLIC PANEL: CPT

## 2022-04-30 PROCEDURE — 6360000004 HC RX CONTRAST MEDICATION: Performed by: EMERGENCY MEDICINE

## 2022-04-30 PROCEDURE — 6370000000 HC RX 637 (ALT 250 FOR IP): Performed by: EMERGENCY MEDICINE

## 2022-04-30 RX ORDER — ACETAMINOPHEN 325 MG/1
650 TABLET ORAL ONCE
Status: COMPLETED | OUTPATIENT
Start: 2022-04-30 | End: 2022-04-30

## 2022-04-30 RX ORDER — AMOXICILLIN 500 MG/1
CAPSULE ORAL
COMMUNITY
Start: 2022-04-28 | End: 2022-05-13 | Stop reason: ALTCHOICE

## 2022-04-30 RX ORDER — DICYCLOMINE HYDROCHLORIDE 10 MG/1
10 CAPSULE ORAL 3 TIMES DAILY
Qty: 15 CAPSULE | Refills: 3 | Status: SHIPPED | OUTPATIENT
Start: 2022-04-30 | End: 2022-05-13 | Stop reason: ALTCHOICE

## 2022-04-30 RX ORDER — SUMATRIPTAN 100 MG/1
100 TABLET, FILM COATED ORAL
Qty: 9 TABLET | Refills: 0 | Status: SHIPPED | OUTPATIENT
Start: 2022-04-30 | End: 2022-04-30

## 2022-04-30 RX ORDER — MORPHINE SULFATE 4 MG/ML
4 INJECTION, SOLUTION INTRAMUSCULAR; INTRAVENOUS ONCE
Status: COMPLETED | OUTPATIENT
Start: 2022-04-30 | End: 2022-04-30

## 2022-04-30 RX ORDER — ONDANSETRON 2 MG/ML
4 INJECTION INTRAMUSCULAR; INTRAVENOUS EVERY 30 MIN PRN
Status: DISCONTINUED | OUTPATIENT
Start: 2022-04-30 | End: 2022-04-30 | Stop reason: HOSPADM

## 2022-04-30 RX ADMIN — ACETAMINOPHEN 650 MG: 325 TABLET ORAL at 13:24

## 2022-04-30 RX ADMIN — MORPHINE SULFATE 4 MG: 4 INJECTION INTRAVENOUS at 11:35

## 2022-04-30 RX ADMIN — ONDANSETRON 4 MG: 2 INJECTION INTRAMUSCULAR; INTRAVENOUS at 11:35

## 2022-04-30 RX ADMIN — IOPAMIDOL 100 ML: 755 INJECTION, SOLUTION INTRAVENOUS at 13:20

## 2022-04-30 ASSESSMENT — PAIN SCALES - GENERAL
PAINLEVEL_OUTOF10: 5
PAINLEVEL_OUTOF10: 10
PAINLEVEL_OUTOF10: 7

## 2022-04-30 ASSESSMENT — PAIN DESCRIPTION - ORIENTATION
ORIENTATION: ANTERIOR
ORIENTATION: LEFT;UPPER

## 2022-04-30 ASSESSMENT — PAIN DESCRIPTION - DESCRIPTORS
DESCRIPTORS: ACHING;DISCOMFORT
DESCRIPTORS: SHARP
DESCRIPTORS: SHARP

## 2022-04-30 ASSESSMENT — PAIN DESCRIPTION - LOCATION
LOCATION: ABDOMEN
LOCATION: ABDOMEN
LOCATION: HEAD

## 2022-04-30 NOTE — ED PROVIDER NOTES
Emergency Department Encounter    Patient: Cary Negron  MRN: [id number]  : 1975  Date of Evaluation: 2022  ED Provider:  Rachana Avila MD    Triage Chief Complaint:   Abdominal Pain (upper pain x 4 days with nausea pt thinks its her gall bladder)    [place]:  Cary Negron is a 41 y.o. female that presents with complaint of upper abdominal pain, over most of her upper abdomen but was worse over the left side, radiates toward the left flank. No specific lower abdomen pain. No dysuria or hematuria. No urinary frequency or urgency. No vomiting but has had nausea. No diarrhea. No chest pain or shortness of breath. No pain with deep inspiration. No fevers. Pain has been ongoing over the last 4 days, and has not improved and has been constant. 10 out of 10. Denies other complaints. Nothing makes it better or worse. Does take medicine for GERD, has not had any issues with that recently. No previous abdominal surgeries other than hysterectomy      ROS - see HPI, below listed is current ROS at time of my eval:  10 systems reviewed and negative except as above.        Past Medical History:   Diagnosis Date    Anxiety     Anxiety     Depression     Headache     migraines    Palpitation     Thyroid disease      Past Surgical History:   Procedure Laterality Date    BREAST REDUCTION SURGERY      HYSTERECTOMY      THYROIDECTOMY, PARTIAL      1992    TYMPANOSTOMY TUBE PLACEMENT       Family History   Problem Relation Age of Onset    Cancer Mother     Heart Disease Mother     Cancer Father     Heart Disease Father      Social History     Socioeconomic History    Marital status:      Spouse name: Not on file    Number of children: Not on file    Years of education: Not on file    Highest education level: Not on file   Occupational History    Not on file   Tobacco Use    Smoking status: Never Smoker    Smokeless tobacco: Never Used   Vaping Use    Vaping Use: Never used   Substance and Sexual Activity    Alcohol use: No    Drug use: No    Sexual activity: Yes     Partners: Male   Other Topics Concern    Not on file   Social History Narrative    Not on file     Social Determinants of Health     Financial Resource Strain:     Difficulty of Paying Living Expenses: Not on file   Food Insecurity:     Worried About Running Out of Food in the Last Year: Not on file    Hyacinth of Food in the Last Year: Not on file   Transportation Needs:     Lack of Transportation (Medical): Not on file    Lack of Transportation (Non-Medical):  Not on file   Physical Activity:     Days of Exercise per Week: Not on file    Minutes of Exercise per Session: Not on file   Stress:     Feeling of Stress : Not on file   Social Connections:     Frequency of Communication with Friends and Family: Not on file    Frequency of Social Gatherings with Friends and Family: Not on file    Attends Alevism Services: Not on file    Active Member of [address] [place] [address] or Organizations: Not on file    Attends Club or Organization Meetings: Not on file    Marital Status: Not on file   Intimate Partner Violence:     Fear of Current or Ex-Partner: Not on file    Emotionally Abused: Not on file    Physically Abused: Not on file    Sexually Abused: Not on file   Housing Stability:     Unable to Pay for Housing in the Last Year: Not on file    [facility] in the [facility]: Not on file    Unstable Housing in the Last Year: Not on file     Current Facility-Administered Medications   Medication Dose Route Frequency Provider Last Rate Last Admin    ondansetron (ZOFRAN) injection 4 mg  4 mg IntraVENous Q30 Min MICKEY Liang MD   4 mg at 04/30/22 1130     Current Outpatient Medications   Medication Sig Dispense Refill    SUMAtriptan (IMITREX) 100 MG tablet Take 1 tablet by mouth once as needed for Migraine 9 tablet 0    dicyclomine (BENTYL) 10 MG capsule Take 1 capsule by mouth 3 times daily As needed for abdominal pain 15 capsule 3    amoxicillin (AMOXIL) 500 MG capsule       zolpidem (AMBIEN) 5 MG tablet       pantoprazole (PROTONIX) 40 MG tablet Take 40 mg by mouth daily      busPIRone (BUSPAR) 15 MG tablet Take 15 mg by mouth in the morning and at bedtime      acetaminophen (AMINOFEN) 325 MG tablet Take 2 tablets by mouth every 6 hours as needed for Pain 120 tablet 0    atorvastatin (LIPITOR) 20 MG tablet       omeprazole (PRILOSEC) 40 MG delayed release capsule Take 40 mg by mouth daily      escitalopram (LEXAPRO) 20 MG tablet Take 20 mg by mouth daily      clonazePAM (KLONOPIN) 0.5 MG tablet Take 1 tablet by mouth 2 times daily as needed for Anxiety for up to 3 days. (Patient not taking: Reported on 4/13/2022) 6 tablet 0     Allergies   Allergen Reactions    Cyclobenzaprine      anger    Prednisone      Rapid heart rate    Trazodone      anger    Benadryl [Diphenhydramine] Anxiety     Severe anxiety and agitation. States can take it oral but not IV    Nsaids Itching     Pt states she can tolerate toradol as of 12/1/21       Nursing Notes Reviewed    Physical Exam:  ED Triage Vitals [04/30/22 1130]   Enc Vitals Group      /84      Pulse 85      Resp 21      Temp 98.5 °F (36.9 °C)      Temp Source Oral      SpO2 98 %      Weight       Height       Head Circumference       Peak Flow       Pain Score       Pain Loc       Pain Edu? Excl. in [address]? My pulse ox interpretation is - normal    General appearance:  No acute distress. Skin:  Warm. Dry. Eye:  Extraocular movements intact. Ears, nose, mouth and throat:  Oral mucosa moist   Neck:  Trachea midline. Extremity:  No swelling. Normal ROM     Heart:  Regular rate and rhythm, normal S1 & S2, no extra heart sounds. Perfusion:  intact  Respiratory:  Lungs clear to auscultation bilaterally. Respirations nonlabored. Abdominal:  Normal bowel sounds. Soft.   Mild to moderate tenderness over left upper quadrant without rebound or guarding, nontender over the lower abdomen. Mild tenderness over the epigastrium. Negative Mixon's non distended.   Neurological:  Alert and oriented           Psychiatric:  Appropriate    I have reviewed and interpreted all of the currently available lab results from this visit (if applicable):  Results for orders placed or performed during the hospital encounter of 04/30/22   CBC with Auto Differential   Result Value Ref Range    WBC 8.2 4.0 - 10.5 K/CU MM    RBC 4.69 4.2 - 5.4 M/CU MM    Hemoglobin 13.5 12.5 - 16.0 GM/DL    Hematocrit 44.0 37 - 47 %    MCV 93.8 78 - 100 FL    MCH 28.8 27 - 31 PG    MCHC 30.7 (L) 32.0 - 36.0 %    RDW 13.0 11.7 - 14.9 %    Platelets 423 383 - 168 K/CU MM    MPV 10.8 7.5 - 11.1 FL    Differential Type AUTOMATED DIFFERENTIAL     Segs Relative 66.9 (H) 36 - 66 %    Lymphocytes % 24.1 24 - 44 %    Monocytes % 5.4 (H) 0 - 4 %    Eosinophils % 2.4 0 - 3 %    Basophils % 0.7 0 - 1 %    Segs Absolute 5.5 K/CU MM    Lymphocytes Absolute 2.0 K/CU MM    Monocytes Absolute 0.4 K/CU MM    Eosinophils Absolute 0.2 K/CU MM    Basophils Absolute 0.1 K/CU MM    Immature Neutrophil % 0.5 (H) 0 - 0.43 %    Total Immature Neutrophil 0.04 K/CU MM   Comprehensive Metabolic Panel   Result Value Ref Range    Sodium 139 135 - 145 MMOL/L    Potassium 4.6 3.5 - 5.1 MMOL/L    Chloride 101 99 - 110 mMol/L    CO2 25 21 - 32 MMOL/L    BUN 15 6 - 23 MG/DL    CREATININE 0.8 0.6 - 1.1 MG/DL    Glucose 88 70 - 99 MG/DL    Calcium 9.5 8.3 - 10.6 MG/DL    Albumin 4.5 3.4 - 5.0 GM/DL    Total Protein 7.1 6.4 - 8.2 GM/DL    Total Bilirubin 1.0 0.0 - 1.0 MG/DL    ALT 6 (L) 10 - 40 U/L    AST 26 15 - 37 IU/L    Alkaline Phosphatase 93 40 - 129 IU/L    GFR Non-African American >60 >60 mL/min/1.73m2    GFR African American >60 >60 mL/min/1.73m2    Anion Gap 13 4 - 16   Troponin   Result Value Ref Range    Troponin T <0.010 <0.01 NG/ML   Lipase   Result Value Ref Range    Lipase 31 13 - 60 IU/L   Urinalysis   Result Value Ref Range    Color, UA YELLOW YELLOW    Clarity, UA SL HAZY CLEAR    Glucose, Urine NEGATIVE NEGATIVE MG/DL    Bilirubin Urine NEGATIVE NEGATIVE MG/DL    Ketones, Urine NEGATIVE NEGATIVE MG/DL    Specific Gravity, UA 1.020 1.001 - 1.035    Blood, Urine NEGATIVE NEGATIVE    pH, Urine 6.0 5.0 - 8.0    Protein, UA NEGATIVE NEGATIVE MG/DL    Urobilinogen, Urine 0.2 0.2 - 1.0 MG/DL    Nitrite Urine, Quantitative NEGATIVE NEGATIVE    Leukocyte Esterase, Urine NEGATIVE NEGATIVE    RBC, UA NEGATIVE 0 - 6 /HPF    WBC, UA NEGATIVE 0 - 5 /HPF    Epithelial Cells, UA 2 TO 3 /HPF    Cast Type NEGATIVE (A) NO CAST FORMS SEEN /HPF    Bacteria, UA FEW NEGATIVE /HPF    Crystal Type NEGATIVE NEGATIVE /HPF   EKG 12 Lead   Result Value Ref Range    Ventricular Rate 78 BPM    Atrial Rate 78 BPM    P-R Interval 148 ms    QRS Duration 86 ms    Q-T Interval 422 ms    QTc Calculation (Bazett) 481 ms    P Axis 31 degrees    R Axis 7 degrees    T Axis 19 degrees    Diagnosis       Normal sinus rhythm  Possible Inferior infarct , age undetermined  Abnormal ECG  When compared with ECG of 01-DEC-2021 15:50,  Nonspecific T wave abnormality no longer evident in Lateral leads        Radiographs (if obtained):  Radiologist's Report Reviewed:  No results found. EKG (if obtained): (All EKG's are interpreted by myself in the absence of a cardiologist)  Normal sinus rhythm with rate of 78 bpm, normal normals. No ST elevation. No previous to compare      MDM:  53-year-old female with history as above presents with epigastric and left upper quadrant pain. EKG as above. I did include a troponin with her labs, though she is not having any chest pain or shortness of breath. Has had constant symptoms for 4 days. Plan for labs and imaging and then reassess. Morphine and Zofran ordered for symptom control    Rechecked prior to going over for CT, informed of her labs.   Her pain is improved, though she does have a slight headache and so I will give a dose of Tylenol. Patient's labs are unremarkable, her CT shows no acute abnormalities. Plan will be for discharge home. I will give her Bentyl for use as needed. She did ask if I could give her a prescription for Imitrex that she has had more frequent migraines than previous. I have will provide this. She already has an appointment with her PCP on Tuesday, and can follow-up with her. Plan will be for discharge, all questions answered, given return precautions. Discharged in stable condition    Clinical Impression:  1. Left upper quadrant abdominal pain      Disposition referral (if applicable):  Isaias Arriaza, [address] [facility] [address]  [phone]    Schedule an appointment as soon as possible for a visit       [facility] Emergency Department  [address] [place] [address]  [phone]    If symptoms worsen    Disposition medications (if applicable):  Discharge Medication List as of 4/30/2022  1:47 PM      START taking these medications    Details   SUMAtriptan (IMITREX) 100 MG tablet Take 1 tablet by mouth once as needed for Migraine, Disp-9 tablet, R-0Normal      dicyclomine (BENTYL) 10 MG capsule Take 1 capsule by mouth 3 times daily As needed for abdominal pain, Disp-15 capsule, R-3Normal           ED Provider Disposition Time  DISPOSITION Decision To Discharge 04/30/2022 01:45:56 PM      Comment: Please note this report has been produced using speech recognition software and may contain errors related to that system including errors in grammar, punctuation, and spelling, as well as words and phrases that may be inappropriate. Efforts were made to edit the dictations.         Moo Taveras MD  04/30/22 5687

## 2022-05-01 LAB
EKG ATRIAL RATE: 78 BPM
EKG DIAGNOSIS: NORMAL
EKG P AXIS: 31 DEGREES
EKG P-R INTERVAL: 148 MS
EKG Q-T INTERVAL: 422 MS
EKG QRS DURATION: 86 MS
EKG QTC CALCULATION (BAZETT): 481 MS
EKG R AXIS: 7 DEGREES
EKG T AXIS: 19 DEGREES
EKG VENTRICULAR RATE: 78 BPM

## 2022-05-01 PROCEDURE — 93010 ELECTROCARDIOGRAM REPORT: CPT | Performed by: INTERNAL MEDICINE

## 2022-05-09 ENCOUNTER — TELEPHONE (OUTPATIENT)
Dept: ORTHOPEDIC SURGERY | Age: 47
End: 2022-05-09

## 2022-05-13 RX ORDER — LANOLIN ALCOHOL/MO/W.PET/CERES
1000 CREAM (GRAM) TOPICAL DAILY
COMMUNITY
End: 2022-09-16

## 2022-05-13 RX ORDER — ACETAMINOPHEN 160 MG
1 TABLET,DISINTEGRATING ORAL DAILY
COMMUNITY
End: 2022-09-16

## 2022-05-13 NOTE — PROGRESS NOTES
.Surgery @ [facility] on 5/24/22 you will be called 5/23/22 with times               1. Do not eat or drink anything after midnight - unless instructed by your doctor prior to surgery. This includes                   no water, chewing gum or mints. 2. Follow your directions as prescribed by the doctor for your procedure and medications. Take Buspar & Protonix morning of surgery with sips of water. 3. Check with your Doctor regarding stopping vitamins, supplements, blood thinners (Plavix, Coumadin, Lovenox, Effient, Pradaxa, Xarelto, Fragmin or                   other blood thinners) and follow their instructions. No NSAIDS, vitamins of supplements with in 7 days of surgery. 4. Do not smoke, and do not drink any alcoholic beverages 24 hours prior to surgery. This includes NA Beer. 5. You may brush your teeth and gargle the morning of surgery. DO NOT SWALLOW WATER   6. You MUST make arrangements for a responsible adult to take you home after your surgery and be able to check on you every couple                   hours for the day. You will not be allowed to leave alone or drive yourself home. It is strongly suggested someone stay with you the first 24                   hrs. Your surgery will be cancelled if you do not have a ride home. 7. Please wear simple, loose fitting clothing to the hospital.  Shanna Arina not bring valuables (money, credit cards, checkbooks, etc.) Do not wear any                   makeup (including no eye makeup) or nail polish on your fingers or toes. 8. DO NOT wear any jewelry or piercings on day of surgery. All body piercing jewelry must be removed. 9. If you have dentures, they will be removed before going to the OR; we will provide you a container. If you wear contact lenses or glasses,                  they will be removed; please bring a case for them. 10. If you  have a Living Will and Durable Power of  for Healthcare, please bring in a copy. 11. Please bring picture ID,  insurance card, paperwork from the doctors office    (H & P, Consent, & card for implantable devices). 12. Take a shower the morning of your procedure with Hibiclens or an anti-bacterial soap. Do not apply any make-up, deodorant, lotion, oil or powder. 13.  Enter thru the main entrance wearing a mask on the day of surgery.

## 2022-05-16 ENCOUNTER — ANESTHESIA EVENT (OUTPATIENT)
Dept: OPERATING ROOM | Age: 47
End: 2022-05-16
Payer: COMMERCIAL

## 2022-05-16 NOTE — ANESTHESIA PRE PROCEDURE
Department of Anesthesiology  Preprocedure Note       Name:  Millicent Byrnes   Age:  41 y.o.  :  1975                                          MRN:  [id number]         Date:  2022      Surgeon: Naomi Zuluaga):  Tory Birmingham DO    Procedure: Procedure(s):  RIGHT KNEE ARTHROSCOPY AND CHONDROPLASTY WITH POSSIBLE LATERAL RELEASE    Medications prior to admission:   Prior to Admission medications    Medication Sig Start Date End Date Taking? Authorizing Provider   vitamin B-12 (CYANOCOBALAMIN) 1000 MCG tablet Take 1,000 mcg by mouth daily   Yes Historical Provider, MD   Cholecalciferol (VITAMIN D3) 50 MCG ( UT) CAPS Take 1 capsule by mouth daily   Yes Historical Provider, MD   SUMAtriptan (IMITREX) 100 MG tablet Take 1 tablet by mouth once as needed for Migraine 22  Markell Macario MD   zolpidem (AMBIEN) 5 MG tablet Take 5 mg by mouth nightly. 22   Historical Provider, MD   pantoprazole (PROTONIX) 40 MG tablet Take 40 mg by mouth daily 22   Historical Provider, MD   busPIRone (BUSPAR) 15 MG tablet Take 15 mg by mouth in the morning and at bedtime    Historical Provider, MD   acetaminophen (AMINOFEN) 325 MG tablet Take 2 tablets by mouth every 6 hours as needed for Pain 21   Silvia Hernandez MD   atorvastatin (LIPITOR) 20 MG tablet  3/16/21   Historical Provider, MD   escitalopram (LEXAPRO) 20 MG tablet Take 20 mg by mouth daily 3/17/21   Historical Provider, MD       Current medications:    No current facility-administered medications for this encounter. Current Outpatient Medications   Medication Sig Dispense Refill    vitamin B-12 (CYANOCOBALAMIN) 1000 MCG tablet Take 1,000 mcg by mouth daily      Cholecalciferol (VITAMIN D3) 50 MCG ( UT) CAPS Take 1 capsule by mouth daily      SUMAtriptan (IMITREX) 100 MG tablet Take 1 tablet by mouth once as needed for Migraine 9 tablet 0    zolpidem (AMBIEN) 5 MG tablet Take 5 mg by mouth nightly.        pantoprazole (PROTONIX) 40 MG tablet Take 40 mg by mouth daily      busPIRone (BUSPAR) 15 MG tablet Take 15 mg by mouth in the morning and at bedtime      acetaminophen (AMINOFEN) 325 MG tablet Take 2 tablets by mouth every 6 hours as needed for Pain 120 tablet 0    atorvastatin (LIPITOR) 20 MG tablet       escitalopram (LEXAPRO) 20 MG tablet Take 20 mg by mouth daily         Allergies: Allergies   Allergen Reactions    Cyclobenzaprine      anger    Prednisone      Rapid heart rate    Trazodone      anger    Benadryl [Diphenhydramine] Anxiety     Severe anxiety and agitation. States can take it oral but not IV    Nsaids Nausea And Vomiting     Pt states she can tolerate toradol as of 12/1/21       Problem List:    Patient Active Problem List   Diagnosis Code    TIA (transient ischemic attack) G45.9    Paresthesia of tongue R44.8    Migraine headache without aura X70.273    Complicated migraine H92. 109    Varicose veins of lower extremities with complications, right I50.258    Status post phlebectomy Z98.890       Past Medical History:        Diagnosis Date    Anxiety     Depression     GERD (gastroesophageal reflux disease)     Headache     migraines    Hyperlipidemia     Palpitation     PONV (postoperative nausea and vomiting)     Thyroid disease     Vitamin D deficiency        Past Surgical History:        Procedure Laterality Date    BREAST REDUCTION SURGERY Bilateral 2007    HYSTERECTOMY  2007    THYROIDECTOMY, PARTIAL      1992    TYMPANOSTOMY TUBE PLACEMENT      As a child up till 14       Social History:    Social History     Tobacco Use    Smoking status: Never Smoker    Smokeless tobacco: Never Used   Substance Use Topics    Alcohol use:  No                                Counseling given: Not Answered      Vital Signs (Current):   Vitals:    05/13/22 1317   Weight: 240 lb (108.9 kg)   Height: 5' 1\" (1.549 m)                                              BP Readings from Last 3 Encounters:   04/30/22 135/68   03/03/22 137/74   12/01/21 122/64       NPO Status:                                                                                 BMI:   Wt Readings from Last 3 Encounters:   04/13/22 230 lb (104.3 kg)   03/28/22 230 lb (104.3 kg)   03/09/22 230 lb (104.3 kg)     Body mass index is 45.35 kg/m². CBC:   Lab Results   Component Value Date    WBC 8.2 04/30/2022    RBC 4.69 04/30/2022    HGB 13.5 04/30/2022    HCT 44.0 04/30/2022    MCV 93.8 04/30/2022    RDW 13.0 04/30/2022     04/30/2022       CMP:   Lab Results   Component Value Date     04/30/2022    K 4.6 04/30/2022     04/30/2022    CO2 25 04/30/2022    BUN 15 04/30/2022    CREATININE 0.8 04/30/2022    GFRAA >60 04/30/2022    LABGLOM >60 04/30/2022    GLUCOSE 88 04/30/2022    PROT 7.1 04/30/2022    PROT 7.0 01/14/2011    CALCIUM 9.5 04/30/2022    BILITOT 1.0 04/30/2022    ALKPHOS 93 04/30/2022    AST 26 04/30/2022    ALT 6 04/30/2022       POC Tests: No results for input(s): POCGLU, POCNA, POCK, POCCL, POCBUN, POCHEMO, POCHCT in the last 72 hours. Coags:   Lab Results   Component Value Date    PROTIME 9.3 05/24/2011    INR 0.80 05/24/2011    APTT 30.2 05/24/2011       HCG (If Applicable):   Lab Results   Component Value Date    PREGTESTUR NEGATIVE 01/21/2014        ABGs: No results found for: PHART, PO2ART, GVG1LJC, SSK5DZG, BEART, I6OUEMCX     Type & Screen (If Applicable):  No results found for: LABABO, LABRH    Drug/Infectious Status (If Applicable):  No results found for: HIV, HEPCAB    COVID-19 Screening (If Applicable):   Lab Results   Component Value Date    COVID19 DETECTED 11/24/2021           Anesthesia Evaluation  Patient summary reviewed   history of anesthetic complications: PONV.   Airway: Mallampati: II  TM distance: <3 FB   Neck ROM: full  Mouth opening: > = 3 FB   Dental:    (+) upper dentures      Pulmonary:                              Cardiovascular:  Exercise tolerance: good (>4 METS),   (+) hyperlipidemia      ECG reviewed                     ROS comment: Normal sinus rhythm   Possible Inferior infarct , age undetermined   Abnormal ECG   When compared with ECG of 01-DEC-2021 15:50,   Nonspecific T wave abnormality no longer evident in Lateral leads   Confirmed by [facility] Torrey MCGOVERN ([id number]) on 5/1/2022 3:39:20 PM      Neuro/Psych:   (+) headaches: migraine headaches, depression/anxiety              ROS comment: Denies tongue numbness and tia  GI/Hepatic/Renal:   (+) GERD: well controlled, morbid obesity          Endo/Other:    (+) : arthritis:., .                 Abdominal:             Vascular:   + PVD, aortic or cerebral, . Other Findings:           Anesthesia Plan      general     ASA 3     (Risks benefits of geta and sab discussed pt desired to proceed with geta   Iv toradol ok )  Induction: intravenous. MIPS: Postoperative opioids intended and Prophylactic antiemetics administered. Anesthetic plan and risks discussed with patient. Use of blood products discussed with patient whom consented to blood products. Plan discussed with CRNA.     Attending anesthesiologist reviewed and agrees with Preprocedure content                JOSÉ Hartmann - CRNA   5/16/2022

## 2022-05-19 NOTE — PROGRESS NOTES
I received a TEAMS message form Tanya Judge At Dr. Jeffry Mcdaniel office in regard to a patient. The conversation is as follows:    Anuel Duran L8:30 AM  Good Morning Ladies ? ? I have patient  calling stating a nurse called her and told her which meds to stop but never gave her time of surgery or whether she needs pre testing, her surgery is next 22. I looked in her chart and I don' see where you guys even called her. So, my question is will she need prior labs? Barnard 8:31 AM  Let me look into it, but can you give me a name and  please? Anuel Duran L8:31 AM  Sorry, that was wrong patient #, the correct on is : [id number]    Ezequiel Montiel 1975 by Sal Shaw L8:32 AM  Ezeuqiel Montiel 1975    Brewerasiya Lyman did this persons PAT 6 days ago and gave her instructions. We don't give time until day before, because sometimes the surgery time has to change. I will call her a find out what she needs and explain that to her. Anuel Duran L8:40 AM  Thank You so much, I appreciate your help    DANIELE Bourne [facility] AM  I just talked to her and explained that she has already had PAT assessment over the phone, she doesn't need any testing because she had labs and a EKG on 22. I also let her know that she will receive a call the day before between 1430 and 1600 to confirm surgery and arrival times.

## 2022-05-23 NOTE — PROGRESS NOTES
Called and confirmed with patient surgery at [facility]  on 5/24/22 at 1120 with an arrival time of   0820. Come into the Main entrance and check in. You can bring two people with you and wear a mask. Patient verbalized understanding.

## 2022-05-24 ENCOUNTER — ANESTHESIA (OUTPATIENT)
Dept: OPERATING ROOM | Age: 47
End: 2022-05-24
Payer: COMMERCIAL

## 2022-05-24 ENCOUNTER — HOSPITAL ENCOUNTER (OUTPATIENT)
Age: 47
Setting detail: OUTPATIENT SURGERY
Discharge: HOME | End: 2022-05-24
Attending: ORTHOPAEDIC SURGERY | Admitting: ORTHOPAEDIC SURGERY
Payer: COMMERCIAL

## 2022-05-24 VITALS
BODY MASS INDEX: 45.31 KG/M2 | HEART RATE: 75 BPM | HEIGHT: 61 IN | TEMPERATURE: 97.9 F | RESPIRATION RATE: 16 BRPM | SYSTOLIC BLOOD PRESSURE: 122 MMHG | OXYGEN SATURATION: 97 % | WEIGHT: 240 LBS | DIASTOLIC BLOOD PRESSURE: 60 MMHG

## 2022-05-24 DIAGNOSIS — Z98.890 S/P RIGHT KNEE ARTHROSCOPY: Primary | ICD-10-CM

## 2022-05-24 PROCEDURE — 2500000003 HC RX 250 WO HCPCS: Performed by: NURSE ANESTHETIST, CERTIFIED REGISTERED

## 2022-05-24 PROCEDURE — 3600000014 HC SURGERY LEVEL 4 ADDTL 15MIN: Performed by: ORTHOPAEDIC SURGERY

## 2022-05-24 PROCEDURE — 3700000001 HC ADD 15 MINUTES (ANESTHESIA): Performed by: ORTHOPAEDIC SURGERY

## 2022-05-24 PROCEDURE — 7100000010 HC PHASE II RECOVERY - FIRST 15 MIN: Performed by: ORTHOPAEDIC SURGERY

## 2022-05-24 PROCEDURE — 7100000000 HC PACU RECOVERY - FIRST 15 MIN: Performed by: ORTHOPAEDIC SURGERY

## 2022-05-24 PROCEDURE — 3600000004 HC SURGERY LEVEL 4 BASE: Performed by: ORTHOPAEDIC SURGERY

## 2022-05-24 PROCEDURE — 2580000003 HC RX 258: Performed by: ORTHOPAEDIC SURGERY

## 2022-05-24 PROCEDURE — 7100000001 HC PACU RECOVERY - ADDTL 15 MIN: Performed by: ORTHOPAEDIC SURGERY

## 2022-05-24 PROCEDURE — 6360000002 HC RX W HCPCS: Performed by: NURSE ANESTHETIST, CERTIFIED REGISTERED

## 2022-05-24 PROCEDURE — 6360000002 HC RX W HCPCS: Performed by: ANESTHESIOLOGY

## 2022-05-24 PROCEDURE — 6360000002 HC RX W HCPCS: Performed by: ORTHOPAEDIC SURGERY

## 2022-05-24 PROCEDURE — 29877 ARTHRS KNEE SURG DBRDMT/SHVG: CPT | Performed by: ORTHOPAEDIC SURGERY

## 2022-05-24 PROCEDURE — 2720000010 HC SURG SUPPLY STERILE: Performed by: ORTHOPAEDIC SURGERY

## 2022-05-24 PROCEDURE — 7100000011 HC PHASE II RECOVERY - ADDTL 15 MIN: Performed by: ORTHOPAEDIC SURGERY

## 2022-05-24 PROCEDURE — 2709999900 HC NON-CHARGEABLE SUPPLY: Performed by: ORTHOPAEDIC SURGERY

## 2022-05-24 PROCEDURE — 3700000000 HC ANESTHESIA ATTENDED CARE: Performed by: ORTHOPAEDIC SURGERY

## 2022-05-24 PROCEDURE — 6370000000 HC RX 637 (ALT 250 FOR IP): Performed by: ORTHOPAEDIC SURGERY

## 2022-05-24 RX ORDER — SODIUM CHLORIDE 0.9 % (FLUSH) 0.9 %
5-40 SYRINGE (ML) INJECTION PRN
Status: DISCONTINUED | OUTPATIENT
Start: 2022-05-24 | End: 2022-05-24 | Stop reason: HOSPADM

## 2022-05-24 RX ORDER — MIDAZOLAM HYDROCHLORIDE 1 MG/ML
INJECTION INTRAMUSCULAR; INTRAVENOUS PRN
Status: DISCONTINUED | OUTPATIENT
Start: 2022-05-24 | End: 2022-05-24 | Stop reason: SDUPTHER

## 2022-05-24 RX ORDER — ONDANSETRON 2 MG/ML
4 INJECTION INTRAMUSCULAR; INTRAVENOUS EVERY 6 HOURS PRN
Status: CANCELLED | OUTPATIENT
Start: 2022-05-24

## 2022-05-24 RX ORDER — SODIUM CHLORIDE 0.9 % (FLUSH) 0.9 %
5-40 SYRINGE (ML) INJECTION EVERY 12 HOURS SCHEDULED
Status: DISCONTINUED | OUTPATIENT
Start: 2022-05-24 | End: 2022-05-24 | Stop reason: HOSPADM

## 2022-05-24 RX ORDER — SODIUM CHLORIDE 0.9 % (FLUSH) 0.9 %
5-40 SYRINGE (ML) INJECTION EVERY 12 HOURS SCHEDULED
Status: CANCELLED | OUTPATIENT
Start: 2022-05-24

## 2022-05-24 RX ORDER — OXYCODONE HYDROCHLORIDE 5 MG/1
5 TABLET ORAL PRN
Status: DISCONTINUED | OUTPATIENT
Start: 2022-05-24 | End: 2022-05-24 | Stop reason: HOSPADM

## 2022-05-24 RX ORDER — SODIUM CHLORIDE 0.9 % (FLUSH) 0.9 %
5-40 SYRINGE (ML) INJECTION PRN
Status: CANCELLED | OUTPATIENT
Start: 2022-05-24

## 2022-05-24 RX ORDER — ONDANSETRON 2 MG/ML
4 INJECTION INTRAMUSCULAR; INTRAVENOUS
Status: COMPLETED | OUTPATIENT
Start: 2022-05-24 | End: 2022-05-24

## 2022-05-24 RX ORDER — HYDROCODONE BITARTRATE AND ACETAMINOPHEN 5; 325 MG/1; MG/1
1 TABLET ORAL EVERY 6 HOURS PRN
Qty: 15 TABLET | Refills: 0 | Status: SHIPPED | OUTPATIENT
Start: 2022-05-24 | End: 2022-05-31

## 2022-05-24 RX ORDER — SODIUM CHLORIDE, SODIUM LACTATE, POTASSIUM CHLORIDE, CALCIUM CHLORIDE 600; 310; 30; 20 MG/100ML; MG/100ML; MG/100ML; MG/100ML
INJECTION, SOLUTION INTRAVENOUS CONTINUOUS
Status: CANCELLED | OUTPATIENT
Start: 2022-05-24

## 2022-05-24 RX ORDER — OXYCODONE HYDROCHLORIDE 5 MG/1
5 TABLET ORAL EVERY 4 HOURS PRN
Status: CANCELLED | OUTPATIENT
Start: 2022-05-24

## 2022-05-24 RX ORDER — FENTANYL CITRATE 50 UG/ML
50 INJECTION, SOLUTION INTRAMUSCULAR; INTRAVENOUS EVERY 5 MIN PRN
Status: DISCONTINUED | OUTPATIENT
Start: 2022-05-24 | End: 2022-05-24 | Stop reason: HOSPADM

## 2022-05-24 RX ORDER — ACETAMINOPHEN 500 MG
1000 TABLET ORAL ONCE
Status: COMPLETED | OUTPATIENT
Start: 2022-05-24 | End: 2022-05-24

## 2022-05-24 RX ORDER — CEPHALEXIN 250 MG/1
250 CAPSULE ORAL 4 TIMES DAILY
Qty: 4 CAPSULE | Refills: 0 | Status: SHIPPED | OUTPATIENT
Start: 2022-05-24 | End: 2022-05-25

## 2022-05-24 RX ORDER — SODIUM CHLORIDE 9 MG/ML
INJECTION, SOLUTION INTRAVENOUS PRN
Status: DISCONTINUED | OUTPATIENT
Start: 2022-05-24 | End: 2022-05-24 | Stop reason: HOSPADM

## 2022-05-24 RX ORDER — CEFAZOLIN SODIUM 2 G/100ML
2000 INJECTION, SOLUTION INTRAVENOUS
Status: COMPLETED | OUTPATIENT
Start: 2022-05-24 | End: 2022-05-24

## 2022-05-24 RX ORDER — SODIUM CHLORIDE 9 MG/ML
INJECTION, SOLUTION INTRAVENOUS PRN
Status: CANCELLED | OUTPATIENT
Start: 2022-05-24

## 2022-05-24 RX ORDER — LABETALOL HYDROCHLORIDE 5 MG/ML
10 INJECTION, SOLUTION INTRAVENOUS
Status: DISCONTINUED | OUTPATIENT
Start: 2022-05-24 | End: 2022-05-24 | Stop reason: HOSPADM

## 2022-05-24 RX ORDER — OXYCODONE HYDROCHLORIDE 5 MG/1
10 TABLET ORAL PRN
Status: DISCONTINUED | OUTPATIENT
Start: 2022-05-24 | End: 2022-05-24 | Stop reason: HOSPADM

## 2022-05-24 RX ORDER — FENTANYL CITRATE 50 UG/ML
25 INJECTION, SOLUTION INTRAMUSCULAR; INTRAVENOUS EVERY 5 MIN PRN
Status: DISCONTINUED | OUTPATIENT
Start: 2022-05-24 | End: 2022-05-24 | Stop reason: HOSPADM

## 2022-05-24 RX ORDER — ROPIVACAINE HYDROCHLORIDE 5 MG/ML
INJECTION, SOLUTION EPIDURAL; INFILTRATION; PERINEURAL
Status: COMPLETED | OUTPATIENT
Start: 2022-05-24 | End: 2022-05-24

## 2022-05-24 RX ORDER — SODIUM CHLORIDE 9 MG/ML
25 INJECTION, SOLUTION INTRAVENOUS PRN
Status: DISCONTINUED | OUTPATIENT
Start: 2022-05-24 | End: 2022-05-24 | Stop reason: HOSPADM

## 2022-05-24 RX ORDER — ONDANSETRON 2 MG/ML
4 INJECTION INTRAMUSCULAR; INTRAVENOUS
Status: DISCONTINUED | OUTPATIENT
Start: 2022-05-24 | End: 2022-05-24 | Stop reason: HOSPADM

## 2022-05-24 RX ORDER — SODIUM CHLORIDE, SODIUM LACTATE, POTASSIUM CHLORIDE, CALCIUM CHLORIDE 600; 310; 30; 20 MG/100ML; MG/100ML; MG/100ML; MG/100ML
INJECTION, SOLUTION INTRAVENOUS CONTINUOUS
Status: DISCONTINUED | OUTPATIENT
Start: 2022-05-24 | End: 2022-05-24 | Stop reason: HOSPADM

## 2022-05-24 RX ORDER — ONDANSETRON 4 MG/1
4 TABLET, ORALLY DISINTEGRATING ORAL EVERY 8 HOURS PRN
Status: CANCELLED | OUTPATIENT
Start: 2022-05-24

## 2022-05-24 RX ORDER — KETOROLAC TROMETHAMINE 30 MG/ML
INJECTION, SOLUTION INTRAMUSCULAR; INTRAVENOUS PRN
Status: DISCONTINUED | OUTPATIENT
Start: 2022-05-24 | End: 2022-05-24 | Stop reason: SDUPTHER

## 2022-05-24 RX ORDER — OXYCODONE HYDROCHLORIDE 5 MG/1
10 TABLET ORAL EVERY 4 HOURS PRN
Status: CANCELLED | OUTPATIENT
Start: 2022-05-24

## 2022-05-24 RX ORDER — LIDOCAINE HYDROCHLORIDE 20 MG/ML
INJECTION, SOLUTION EPIDURAL; INFILTRATION; INTRACAUDAL; PERINEURAL PRN
Status: DISCONTINUED | OUTPATIENT
Start: 2022-05-24 | End: 2022-05-24 | Stop reason: SDUPTHER

## 2022-05-24 RX ORDER — HYDRALAZINE HYDROCHLORIDE 20 MG/ML
10 INJECTION INTRAMUSCULAR; INTRAVENOUS
Status: DISCONTINUED | OUTPATIENT
Start: 2022-05-24 | End: 2022-05-24 | Stop reason: HOSPADM

## 2022-05-24 RX ORDER — ONDANSETRON 2 MG/ML
INJECTION INTRAMUSCULAR; INTRAVENOUS PRN
Status: DISCONTINUED | OUTPATIENT
Start: 2022-05-24 | End: 2022-05-24 | Stop reason: SDUPTHER

## 2022-05-24 RX ORDER — FENTANYL CITRATE 50 UG/ML
INJECTION, SOLUTION INTRAMUSCULAR; INTRAVENOUS PRN
Status: DISCONTINUED | OUTPATIENT
Start: 2022-05-24 | End: 2022-05-24 | Stop reason: SDUPTHER

## 2022-05-24 RX ORDER — PROPOFOL 10 MG/ML
INJECTION, EMULSION INTRAVENOUS PRN
Status: DISCONTINUED | OUTPATIENT
Start: 2022-05-24 | End: 2022-05-24 | Stop reason: SDUPTHER

## 2022-05-24 RX ADMIN — FENTANYL CITRATE 50 MCG: 50 INJECTION, SOLUTION INTRAMUSCULAR; INTRAVENOUS at 12:20

## 2022-05-24 RX ADMIN — LIDOCAINE HYDROCHLORIDE 50 MG: 20 INJECTION, SOLUTION EPIDURAL; INFILTRATION; INTRACAUDAL; PERINEURAL at 11:11

## 2022-05-24 RX ADMIN — FENTANYL CITRATE 100 MCG: 50 INJECTION, SOLUTION INTRAMUSCULAR; INTRAVENOUS at 11:11

## 2022-05-24 RX ADMIN — ONDANSETRON 4 MG: 2 INJECTION INTRAMUSCULAR; INTRAVENOUS at 11:05

## 2022-05-24 RX ADMIN — CEFAZOLIN SODIUM 2000 MG: 2 INJECTION, SOLUTION INTRAVENOUS at 11:01

## 2022-05-24 RX ADMIN — ONDANSETRON 4 MG: 2 INJECTION INTRAMUSCULAR; INTRAVENOUS at 12:17

## 2022-05-24 RX ADMIN — FENTANYL CITRATE 50 MCG: 50 INJECTION, SOLUTION INTRAMUSCULAR; INTRAVENOUS at 12:37

## 2022-05-24 RX ADMIN — KETOROLAC TROMETHAMINE 30 MG: 30 INJECTION, SOLUTION INTRAMUSCULAR; INTRAVENOUS at 11:45

## 2022-05-24 RX ADMIN — MIDAZOLAM 2 MG: 1 INJECTION INTRAMUSCULAR; INTRAVENOUS at 11:08

## 2022-05-24 RX ADMIN — PROPOFOL 20 MG: 10 INJECTION, EMULSION INTRAVENOUS at 11:40

## 2022-05-24 RX ADMIN — ACETAMINOPHEN 1000 MG: 500 TABLET ORAL at 09:23

## 2022-05-24 RX ADMIN — FENTANYL CITRATE 50 MCG: 50 INJECTION, SOLUTION INTRAMUSCULAR; INTRAVENOUS at 11:40

## 2022-05-24 RX ADMIN — SODIUM CHLORIDE, POTASSIUM CHLORIDE, SODIUM LACTATE AND CALCIUM CHLORIDE: 600; 310; 30; 20 INJECTION, SOLUTION INTRAVENOUS at 09:10

## 2022-05-24 RX ADMIN — PROPOFOL 150 MG: 10 INJECTION, EMULSION INTRAVENOUS at 11:11

## 2022-05-24 ASSESSMENT — PAIN DESCRIPTION - DESCRIPTORS
DESCRIPTORS: BURNING
DESCRIPTORS: BURNING
DESCRIPTORS: DISCOMFORT
DESCRIPTORS: BURNING
DESCRIPTORS: THROBBING

## 2022-05-24 ASSESSMENT — PAIN SCALES - GENERAL
PAINLEVEL_OUTOF10: 5
PAINLEVEL_OUTOF10: 7
PAINLEVEL_OUTOF10: 10
PAINLEVEL_OUTOF10: 10
PAINLEVEL_OUTOF10: 2

## 2022-05-24 ASSESSMENT — PAIN DESCRIPTION - FREQUENCY
FREQUENCY: CONTINUOUS
FREQUENCY: CONTINUOUS

## 2022-05-24 ASSESSMENT — PAIN DESCRIPTION - ORIENTATION
ORIENTATION: RIGHT

## 2022-05-24 ASSESSMENT — PAIN DESCRIPTION - PAIN TYPE
TYPE: SURGICAL PAIN
TYPE: SURGICAL PAIN

## 2022-05-24 ASSESSMENT — PAIN DESCRIPTION - LOCATION
LOCATION: KNEE

## 2022-05-24 ASSESSMENT — PAIN - FUNCTIONAL ASSESSMENT
PAIN_FUNCTIONAL_ASSESSMENT: 0-10
PAIN_FUNCTIONAL_ASSESSMENT: ACTIVITIES ARE NOT PREVENTED

## 2022-05-24 NOTE — H&P
Past Medical History:   Diagnosis Date    Anxiety      Anxiety      Depression      Headache       migraines    Palpitation      Thyroid disease              No current facility-administered medications on file prior to encounter. Current Outpatient Medications on File Prior to Encounter   Medication Sig Dispense Refill    vitamin B-12 (CYANOCOBALAMIN) 1000 MCG tablet Take 1,000 mcg by mouth daily      Cholecalciferol (VITAMIN D3) 50 MCG (2000 UT) CAPS Take 1 capsule by mouth daily      zolpidem (AMBIEN) 5 MG tablet Take 5 mg by mouth nightly.  pantoprazole (PROTONIX) 40 MG tablet Take 40 mg by mouth daily      busPIRone (BUSPAR) 15 MG tablet Take 15 mg by mouth in the morning and at bedtime      acetaminophen (AMINOFEN) 325 MG tablet Take 2 tablets by mouth every 6 hours as needed for Pain 120 tablet 0    atorvastatin (LIPITOR) 20 MG tablet       escitalopram (LEXAPRO) 20 MG tablet Take 20 mg by mouth daily       Allergies   Allergen Reactions    Cyclobenzaprine      anger    Prednisone      Rapid heart rate    Trazodone      anger    Benadryl [Diphenhydramine] Anxiety     Severe anxiety and agitation. States can take it oral but not IV    Nsaids Nausea And Vomiting     Pt states she can tolerate toradol as of 12/1/21     Past Surgical History:   Procedure Laterality Date    BREAST REDUCTION SURGERY Bilateral 2007    HYSTERECTOMY  2007    THYROIDECTOMY, PARTIAL      1992    TYMPANOSTOMY TUBE PLACEMENT      As a child up till 1992     Social History     Tobacco Use    Smoking status: Never Smoker    Smokeless tobacco: Never Used   Vaping Use    Vaping Use: Never used   Substance Use Topics    Alcohol use: No    Drug use: No     Family History   Problem Relation Age of Onset    Cancer Mother     Heart Disease Mother     Cancer Father     Heart Disease Father        Objective:   Physical Exam  Constitutional:       Appearance: She is well-developed.    HENT:      Head: Normocephalic. Eyes:      Pupils: Pupils are equal, round, and reactive to light. Pulmonary:      Effort: Pulmonary effort is normal.   Musculoskeletal:         General: Swelling and tenderness present. No deformity. Normal range of motion. Cervical back: Normal range of motion. Right hip: Normal.      Left hip: Normal.      Right knee: Bony tenderness and crepitus present. No swelling, deformity, effusion, erythema, ecchymosis or lacerations. Normal range of motion. Tenderness present over the patellar tendon. No medial joint line, lateral joint line, MCL or LCL tenderness. No LCL laxity or MCL laxity. Normal alignment and normal patellar mobility. Left knee: Normal. No swelling, deformity, effusion, erythema, ecchymosis, lacerations or bony tenderness. Normal range of motion. No tenderness. No medial joint line, lateral joint line, MCL, LCL or patellar tendon tenderness. No LCL laxity or MCL laxity. Normal alignment and normal patellar mobility. Skin:     General: Skin is warm and dry. Capillary Refill: Capillary refill takes less than 2 seconds. Coloration: Skin is not pale. Findings: No erythema or rash. Neurological:      Mental Status: She is alert and oriented to person, place, and time. Right knee-Skin intact with no erythema, ecchymosis or lacerations present.   0-120  Significant patellofemoral crepitus  Negative apprehension sign.     XRAY  X-ray MRI of the right knee from March 23, 2022 reviewed by me today in the office demonstrates age appropriate bone density throughout with moderate lateral tracking of the patella, large knee effusion, significant cartilage loss under the patella, medial lateral meniscus intact, ACL PCL intact.     /83   Pulse 85   Temp 97.7 °F (36.5 °C) (Temporal)   Resp 20   Ht 5' 1\" (1.549 m)   Wt 240 lb (108.9 kg)   SpO2 99%   BMI 45.35 kg/m²        Assessment:   Right knee patellofemoral syndrome  Right knee chondromalacia patella                Plan:   I discussed with her today her MRI findings. I explained to her that she does have maltracking in her patella as well as diffuse cartilage loss. At this point given her persistent symptoms and with her MRI findings I recommend surgical treatment. I discussed with her today performing right knee arthroscopy with chondroplasty and possible lateral release and unless her patella is unstable then we would just proceed with chondroplasty. I do not believe she is a candidate for MPFL reconstruction at this point. I explained risks, benefits, possible complications of the procedure and answered all questions for the patient. I explained postoperative rehabilitation protocol and expectations with the patient today. The patient understands and consents to the procedure. Patient will follow up with their primary care physician prior to surgical treatment for preoperative clearance. We will schedule surgery at soonest convenience. Continue weight-bearing as tolerated. Continue range of motion exercises as instructed. Ice and elevate as needed. Tylenol or Motrin for pain. Follow up in 2 weeks postop    The patient was counseled at length about the risks of omkar Covid-19 during their perioperative period and any recovery window from their procedure. The patient was made aware that omkar Covid-19  may worsen their prognosis for recovering from their procedure  and lend to a higher morbidity and/or mortality risk. All material risks, benefits, and reasonable alternatives including postponing the procedure were discussed. The patient does wish to proceed with the procedure at this time.       Pt seen and examined, No change in H+P.                       TORIE MARTINEZ DO

## 2022-05-24 NOTE — PROGRESS NOTES
1159 Patient arrived to PACU, monitors placed and alarms on. Report received from [address]. 4734 Medicated for complaint of nausea. [phone] Medicated for complaint of pain  1237 Stated nausea has improved. Medicated for complaint of pain.     [address] chips, denies any nausea  1300 Transported to room SD 14 at 1300

## 2022-05-24 NOTE — OP NOTE
DATE OF PROCEDURE:   5/24/2022     PREOPERATIVE DIAGNOSIS:  Right knee patellofemoral syndrome. POSTOPERATIVE DIAGNOSES:  1. Right knee grade IV chondromalacia of the femoral trochlea and grade II chondromalacia of the patella and medial tibial plateau. PROCEDURES:  1. Diagnostic and operative arthroscopy of right knee with chondroplasty of the femoral trochlea, patella and medial tibial plateau. ATTENDING SURGEON:  Christel Camacho DO.     FIRST ASSISTANT: KAREN Silva    ANESTHESIA:  General.     ESTIMATED BLOOD LOSS:  5 mL. TOTAL TOURNIQUET TIME:  14 minutes. FLUIDS:  800 mL of crystalloids. INDICATION FOR PROCEDURE:  The patient is a 77-year-old female with  long-standing history of right knee pain. She underwent conservative treatment   with no relief of her symptoms. MRI was subsequently obtained, which showed   evidence of a patellofemoral maltracking as well as arthritic change. Given her persistent   symptoms despite conservative treatment and with her MRI findings, I recommended surgical treatment. I  explained the risks, benefits, and possible complications of the  procedure to the patient, and after answering all of her questions, she  consented to undergo the above procedure. DESCRIPTION OF PROCEDURE:  The patient was seen and evaluated in the  preoperative holding area where the right lower extremity was signed in  her presence. At this point, care of the patient was turned over to  Anesthesia Team and transported back to the operative suite. She was  placed supine on the operating table with the right lower extremity in  the leg buckner. General anesthesia was applied, and once adequate  anesthesia was obtained, the right lower extremity was prepped and  draped in usual sterile fashion. Preoperative antibiotics were  administered. At this point, a timeout was performed and all in  attendance were in agreement.      I exsanguinated the right lower extremity with the use of an Esmarch and  tourniquet was inflated to 280 mmHg. I then made anterior medial and  anterior lateral portal incisions and started and inserted arthroscopic  instrumentation into the knee joint through the anterolateral portal.   At this point, I performed a standard diagnostic arthroscopy evaluating  the patellofemoral compartment, medial and lateral compartments of the  knee, as well as the medial and lateral gutters of the knee. Within the patellofemoral compartment and found there to be grade 2  arthritic changes on the patella with extensive grade 4 arthritic change on the femoral trochlea. I then used the Arthrocare  wand to perform a chondroplasty of the patella and femoral trochlea debriding loose articular cartilage   back to solid stable border circumferentially. I then evaluated the femoral notch and found ACL and  PCL to be intact. I then turned my attention to the lateral compartment. Within the  lateral compartment, I found  the cartilage on the femoral condyle and tibial plateau to be virtually intact. I then evaluated the lateral meniscus  and found the meniscus to be intact. I then turned my attention to the medial compartment of the knee. Within the medial compartment I found there to be a moderate sized lesion of grade 2 arthritic change on the tibial plateau with stable cartilage borders. The cartilage on the femoral condyle was virtually intact. The medial meniscus was probed and found to be intact. I then evaluated the medial and lateral gutters of the knee and found there to be no additional pathology present. Arthroscopic instrumentation was then removed of the knee. Excess fluid was then drained from the knee. Skin incisions were then closed using 3-0 nylon suture. Tourniquet was then deflated for total of 14 minutes and adequate hemostasis was maintained. I then injected 30 mL of 0.5% plain ropivacaine into the knee joint.   I then applied a sterile soft dressing to the right lower extremity. The patient was then awakened from anesthesia and transported to PACU in stable condition. She appeared to have tolerated the procedure well. PROGNOSIS:  At this point, she will be discharged to home with a short  course of oral antibiotics as well as pain medication. She can have  immediate weightbearing as tolerated and range of motion as tolerated on the right leg and I will see her back in the office in 2 weeks for  suture removal and continue to monitor her prognosis in outpatient  setting for resolution of her symptoms.            Irma 97, DO

## 2022-05-24 NOTE — BRIEF OP NOTE
Brief Postoperative Note      Patient: Cary Negron  YOB: 1975  MRN: [id number]    Date of Procedure: 5/24/2022    Pre-Op Diagnosis: Chondromalacia of right patella [M22.41] Lateral subluxation of right patella, initial encounter [G19.887N]    Post-Op Diagnosis: Same       Procedure(s):  RIGHT KNEE ARTHROSCOPY AND CHONDROPLASTY WITH POSSIBLE LATERAL RELEASE    Surgeon(s):  Kvng Coyle DO    Assistant:  Physician Assistant: Sarika Chappell PA-C    Anesthesia: General    Estimated Blood Loss (mL): Minimal    Complications: None    Specimens:   * No specimens in log *    Implants:  * No implants in log *      Drains: * No LDAs found *    Findings: R knee OA    Electronically signed by Irma Salmeron DO on 5/24/2022 at 11:52 AM

## 2022-05-24 NOTE — PROGRESS NOTES
1310 Report given to this nurse from Maisha Alvarez., SUSY. Patient A&O x4. Beverage of choice offered to patient. Call light in reach and bed in lowest position. 1400 IV removed. Discharge instructions given to  Kingston Adams. 1407 Patient sitting on side of bed getting dressed. 1430 Patient escorted to car via wheelchair where  is taking the patient home.

## 2022-05-26 NOTE — ANESTHESIA POSTPROCEDURE EVALUATION
Department of Anesthesiology  Postprocedure Note    Patient: Brenda Rodrigues  MRN: [id number]  YOB: 1975  Date of evaluation: 5/26/2022  Time:  2:03 PM     Procedure Summary     Date: 05/24/22 Room / Location: [address]    Anesthesia Start: 1108 Anesthesia Stop: 2479    Procedure: RIGHT KNEE ARTHROSCOPY WITH CHONDROPLASTY (Right Knee) Diagnosis:       Chondromalacia of right patella      Lateral subluxation of right patella, initial encounter      (Chondromalacia of right patella [M22.41] Lateral subluxation of right patella, initial encounter [id number])    Surgeons: Fady Hughes DO Responsible Provider: Sejal Rg MD    Anesthesia Type: general ASA Status: 3          Anesthesia Type: No value filed. Reyes Phase I: Reyes Score: 10    Reyes Phase II: Reyes Score: 10    Last vitals: Reviewed and per EMR flowsheets.        Anesthesia Post Evaluation    Patient location during evaluation: PACU  Patient participation: complete - patient participated  Level of consciousness: sleepy but conscious  Pain score: 2  Airway patency: patent  Nausea & Vomiting: no nausea and no vomiting  Complications: no  Cardiovascular status: hemodynamically stable  Respiratory status: acceptable  Hydration status: euvolemic

## 2022-05-31 ENCOUNTER — TELEPHONE (OUTPATIENT)
Dept: ORTHOPEDIC SURGERY | Age: 47
End: 2022-05-31

## 2022-06-01 DIAGNOSIS — Z98.890 S/P RIGHT KNEE ARTHROSCOPY: Primary | ICD-10-CM

## 2022-06-01 RX ORDER — HYDROCODONE BITARTRATE AND ACETAMINOPHEN 5; 325 MG/1; MG/1
1 TABLET ORAL EVERY 6 HOURS PRN
Qty: 15 TABLET | Refills: 0 | Status: SHIPPED | OUTPATIENT
Start: 2022-06-01 | End: 2022-06-08

## 2022-06-10 ENCOUNTER — OFFICE VISIT (OUTPATIENT)
Dept: ORTHOPEDIC SURGERY | Age: 47
End: 2022-06-10

## 2022-06-10 VITALS
DIASTOLIC BLOOD PRESSURE: 80 MMHG | HEART RATE: 82 BPM | BODY MASS INDEX: 45.31 KG/M2 | WEIGHT: 240 LBS | RESPIRATION RATE: 15 BRPM | SYSTOLIC BLOOD PRESSURE: 116 MMHG | HEIGHT: 61 IN

## 2022-06-10 DIAGNOSIS — Z98.890 S/P RIGHT KNEE ARTHROSCOPY: Primary | ICD-10-CM

## 2022-06-10 PROCEDURE — 99024 POSTOP FOLLOW-UP VISIT: CPT | Performed by: PHYSICIAN ASSISTANT

## 2022-06-10 NOTE — PATIENT INSTRUCTIONS
Continue weight-bearing as tolerated. Continue range of motion exercises as instructed. Ice and elevate as needed. Tylenol or Motrin for pain.   Sutures removed today   Follow up in 4-6 weeks

## 2022-06-10 NOTE — PROGRESS NOTES
Date of surgery:  5/24/2022  Surgeon: Dr. Sarah Lucas    History:  Ms. Eather Cranker is here in follow up regarding her right knee arthroscopy with chondroplasty. She states that she is doing very well and is able to bend the knee and straighten the knee more than she has in a long time. Her pain today is a 1/10. She is having no complications, no dizziness shortness of breath or chest pain. Physical:  Vitals:    06/10/22 0804   BP: 116/80   Pulse: 82   Resp: 15   Weight: 240 lb (108.9 kg)   Height: 5' 1\" (1.549 m)     Right knee range of motion 0-120 degrees  No pain with range of motion  No significant joint effusion  Arthroscopic portal sites are well-healed    Impression: Status post right knee arthroscopy with chondroplasty-doing well    Plan:   Patient Instructions   Continue weight-bearing as tolerated. Continue range of motion exercises as instructed. Ice and elevate as needed. Tylenol or Motrin for pain.   Sutures removed today   Follow up in 4-6 weeks

## 2022-06-10 NOTE — PROGRESS NOTES
Patient returns to the office today for s/p check of the right knee scope DOS 5/24/22. Pt states pain today is a 1/10. Overall she is doing well and has no concerns at this time.

## 2022-07-22 ENCOUNTER — APPOINTMENT (OUTPATIENT)
Dept: GENERAL RADIOLOGY | Age: 47
End: 2022-07-22
Payer: COMMERCIAL

## 2022-07-22 ENCOUNTER — HOSPITAL ENCOUNTER (EMERGENCY)
Age: 47
Discharge: HOME | End: 2022-07-22
Attending: EMERGENCY MEDICINE
Payer: COMMERCIAL

## 2022-07-22 VITALS
DIASTOLIC BLOOD PRESSURE: 65 MMHG | SYSTOLIC BLOOD PRESSURE: 127 MMHG | HEART RATE: 79 BPM | TEMPERATURE: 98.3 F | HEIGHT: 60 IN | BODY MASS INDEX: 47.12 KG/M2 | WEIGHT: 240 LBS | OXYGEN SATURATION: 99 % | RESPIRATION RATE: 11 BRPM

## 2022-07-22 DIAGNOSIS — R07.9 CHEST PAIN, UNSPECIFIED TYPE: Primary | ICD-10-CM

## 2022-07-22 LAB
ADENOVIRUS DETECTION BY PCR: NOT DETECTED
ANION GAP SERPL CALCULATED.3IONS-SCNC: 14 MMOL/L (ref 4–16)
BASE EXCESS MIXED: 1.6 (ref 0–2.3)
BASE EXCESS: ABNORMAL (ref 0–2.4)
BASOPHILS ABSOLUTE: 0.1 K/CU MM
BASOPHILS RELATIVE PERCENT: 0.9 % (ref 0–1)
BORDETELLA PARAPERTUSSIS BY PCR: NOT DETECTED
BORDETELLA PERTUSSIS PCR: NOT DETECTED
BUN BLDV-MCNC: 11 MG/DL (ref 6–23)
CALCIUM SERPL-MCNC: 9.4 MG/DL (ref 8.3–10.6)
CHLAMYDOPHILA PNEUMONIA PCR: NOT DETECTED
CHLORIDE BLD-SCNC: 103 MMOL/L (ref 99–110)
CO2: 25 MMOL/L (ref 21–32)
CO2: 29 MMOL/L (ref 21–32)
CORONAVIRUS 229E PCR: NOT DETECTED
CORONAVIRUS HKU1 PCR: NOT DETECTED
CORONAVIRUS NL63 PCR: NOT DETECTED
CORONAVIRUS OC43 PCR: NOT DETECTED
CREAT SERPL-MCNC: 0.7 MG/DL (ref 0.6–1.1)
D DIMER: <200 NG/ML(DDU)
DIFFERENTIAL TYPE: ABNORMAL
EKG ATRIAL RATE: 91 BPM
EKG DIAGNOSIS: NORMAL
EKG P AXIS: 42 DEGREES
EKG P-R INTERVAL: 122 MS
EKG Q-T INTERVAL: 386 MS
EKG QRS DURATION: 80 MS
EKG QTC CALCULATION (BAZETT): 474 MS
EKG R AXIS: 24 DEGREES
EKG T AXIS: 27 DEGREES
EKG VENTRICULAR RATE: 91 BPM
EOSINOPHILS ABSOLUTE: 0.4 K/CU MM
EOSINOPHILS RELATIVE PERCENT: 5.1 % (ref 0–3)
GFR AFRICAN AMERICAN: >60 ML/MIN/1.73M2
GFR AFRICAN AMERICAN: >60 ML/MIN/1.73M2
GFR NON-AFRICAN AMERICAN: >60 ML/MIN/1.73M2
GFR NON-AFRICAN AMERICAN: >60 ML/MIN/1.73M2
GLUCOSE BLD-MCNC: 100 MG/DL (ref 70–99)
GLUCOSE BLD-MCNC: 100 MG/DL (ref 70–99)
HCO3 ARTERIAL: 27.8 MMOL/L (ref 18–23)
HCT VFR BLD CALC: 43.1 % (ref 37–47)
HEMOGLOBIN: 13.6 GM/DL (ref 12.5–16)
HUMAN METAPNEUMOVIRUS PCR: NOT DETECTED
IMMATURE NEUTROPHIL %: 0.3 % (ref 0–0.43)
INFLUENZA A BY PCR: NOT DETECTED
INFLUENZA A H1 (2009) PCR: NOT DETECTED
INFLUENZA A H1 PANDEMIC PCR: NOT DETECTED
INFLUENZA A H3 PCR: NOT DETECTED
INFLUENZA B BY PCR: NOT DETECTED
LYMPHOCYTES ABSOLUTE: 1.8 K/CU MM
LYMPHOCYTES RELATIVE PERCENT: 24.1 % (ref 24–44)
MAGNESIUM: 2.1 MG/DL (ref 1.8–2.4)
MCH RBC QN AUTO: 29.2 PG (ref 27–31)
MCHC RBC AUTO-ENTMCNC: 31.6 % (ref 32–36)
MCV RBC AUTO: 92.5 FL (ref 78–100)
MONOCYTES ABSOLUTE: 0.4 K/CU MM
MONOCYTES RELATIVE PERCENT: 5 % (ref 0–4)
MYCOPLASMA PNEUMONIAE PCR: NOT DETECTED
O2 SATURATION: 81.1 % (ref 96–97)
PARAINFLUENZA 1 PCR: NOT DETECTED
PARAINFLUENZA 2 PCR: NOT DETECTED
PARAINFLUENZA 3 PCR: NOT DETECTED
PARAINFLUENZA 4 PCR: NOT DETECTED
PCO2 ARTERIAL: 47.9 MMHG (ref 32–45)
PDW BLD-RTO: 13.3 % (ref 11.7–14.9)
PH BLOOD: 7.37 (ref 7.34–7.45)
PHOSPHORUS: 3.2 MG/DL (ref 2.5–4.9)
PLATELET # BLD: 284 K/CU MM (ref 140–440)
PMV BLD AUTO: 11.2 FL (ref 7.5–11.1)
PO2 ARTERIAL: 47.2 MMHG (ref 75–100)
POC CALCIUM: 1.19 MMOL/L (ref 1.12–1.32)
POC CHLORIDE: 106 MMOL/L (ref 98–109)
POC CREATININE: 0.8 MG/DL (ref 0.6–1.1)
POTASSIUM SERPL-SCNC: 3.8 MMOL/L (ref 3.5–4.5)
POTASSIUM SERPL-SCNC: 4 MMOL/L (ref 3.5–5.1)
PRO-BNP: 25.02 PG/ML
RBC # BLD: 4.66 M/CU MM (ref 4.2–5.4)
RHINOVIRUS ENTEROVIRUS PCR: NOT DETECTED
RSV PCR: NOT DETECTED
SARS-COV-2: NOT DETECTED
SEGMENTED NEUTROPHILS ABSOLUTE COUNT: 4.8 K/CU MM
SEGMENTED NEUTROPHILS RELATIVE PERCENT: 64.6 % (ref 36–66)
SODIUM BLD-SCNC: 142 MMOL/L (ref 135–145)
SODIUM BLD-SCNC: 145 MMOL/L (ref 138–146)
SOURCE, BLOOD GAS: ABNORMAL
TOTAL IMMATURE NEUTOROPHIL: 0.02 K/CU MM
TROPONIN T: <0.01 NG/ML
WBC # BLD: 7.4 K/CU MM (ref 4–10.5)

## 2022-07-22 PROCEDURE — 82565 ASSAY OF CREATININE: CPT

## 2022-07-22 PROCEDURE — 85025 COMPLETE CBC W/AUTO DIFF WBC: CPT

## 2022-07-22 PROCEDURE — 93010 ELECTROCARDIOGRAM REPORT: CPT | Performed by: INTERNAL MEDICINE

## 2022-07-22 PROCEDURE — 85379 FIBRIN DEGRADATION QUANT: CPT

## 2022-07-22 PROCEDURE — 84100 ASSAY OF PHOSPHORUS: CPT

## 2022-07-22 PROCEDURE — 0202U NFCT DS 22 TRGT SARS-COV-2: CPT

## 2022-07-22 PROCEDURE — 85018 HEMOGLOBIN: CPT

## 2022-07-22 PROCEDURE — 83880 ASSAY OF NATRIURETIC PEPTIDE: CPT

## 2022-07-22 PROCEDURE — 99285 EMERGENCY DEPT VISIT HI MDM: CPT

## 2022-07-22 PROCEDURE — 80051 ELECTROLYTE PANEL: CPT

## 2022-07-22 PROCEDURE — 84484 ASSAY OF TROPONIN QUANT: CPT

## 2022-07-22 PROCEDURE — 85014 HEMATOCRIT: CPT

## 2022-07-22 PROCEDURE — 82803 BLOOD GASES ANY COMBINATION: CPT

## 2022-07-22 PROCEDURE — 82962 GLUCOSE BLOOD TEST: CPT

## 2022-07-22 PROCEDURE — 71045 X-RAY EXAM CHEST 1 VIEW: CPT

## 2022-07-22 PROCEDURE — 80048 BASIC METABOLIC PNL TOTAL CA: CPT

## 2022-07-22 PROCEDURE — 93005 ELECTROCARDIOGRAM TRACING: CPT | Performed by: EMERGENCY MEDICINE

## 2022-07-22 PROCEDURE — 83735 ASSAY OF MAGNESIUM: CPT

## 2022-07-22 RX ORDER — KETOROLAC TROMETHAMINE 30 MG/ML
30 INJECTION, SOLUTION INTRAMUSCULAR; INTRAVENOUS ONCE
Status: DISCONTINUED | OUTPATIENT
Start: 2022-07-22 | End: 2022-07-22 | Stop reason: HOSPADM

## 2022-07-22 RX ORDER — NAPROXEN 500 MG/1
500 TABLET ORAL 2 TIMES DAILY
Qty: 20 TABLET | Refills: 0 | Status: SHIPPED | OUTPATIENT
Start: 2022-07-22 | End: 2022-09-16

## 2022-07-22 RX ORDER — PROCHLORPERAZINE EDISYLATE 5 MG/ML
10 INJECTION INTRAMUSCULAR; INTRAVENOUS ONCE
Status: DISCONTINUED | OUTPATIENT
Start: 2022-07-22 | End: 2022-07-22 | Stop reason: HOSPADM

## 2022-07-22 ASSESSMENT — HEART SCORE: ECG: 0

## 2022-07-22 NOTE — ED PROVIDER NOTES
Emergency Department Encounter  Location: [address]    Patient: Cruz Stauffer  MRN: [id number]  : 1975  Date of evaluation: 2022  ED Provider: Radha Olmedo DO, FACEP    Chief Complaint:    Chest Pain (Heaviness since she woke up this morning) and Tingling (All over body )    [place]:  Cruz Stauffer is a 41 y.o. female that presents to the emergency department with complaints of chest heaviness and tingling in her arms and legs she is also complaining of a headache. States this started 2 days ago. She has taken Imitrex which did seem to help her headache yesterday. She has had nausea but no vomiting. She denies fever or chills. She denies congestion or cough. She states the heaviness in her chest does not seem to get worse with movement. She denies being around anyone with COVID. She took some ibuprofen and Tylenol with minimal relief. ROS - see HPI, below listed is current ROS at time of my eval:  At least 10 systems reviewed and otherwise acutely negative except as in the [address]   General:  No fevers, no chills, no weakness  Eyes:  No recent vison changes, no discharge  ENT:  No sore throat, no nasal congestion, no hearing changes  Cardiovascular: Positive for chest pain, no palpitations  Respiratory:  No shortness of breath, no cough, no wheezing  Gastrointestinal:  No pain, no nausea, no vomiting, no diarrhea  Musculoskeletal:  No muscle pain, no joint pain  Skin:  No rash, no pruritis, no easy bruising  Neurologic:  No speech problems, positive for headache, no extremity numbness, no extremity tingling, no extremity weakness  Psychiatric:  No anxiety  Genitourinary:  No dysuria, no hematuria  Endocrine:  No unexpected weight gain, no unexpected weight loss  Extremities:  no edema, no pain    Past Medical History:   Diagnosis Date    Anxiety     Depression     GERD (gastroesophageal reflux disease)     Headache     migraines    Hyperlipidemia     Palpitation PONV (postoperative nausea and vomiting)     Thyroid disease     Vitamin D deficiency      Past Surgical History:   Procedure Laterality Date    BREAST REDUCTION SURGERY Bilateral 2007    HYSTERECTOMY (CERVIX STATUS UNKNOWN)  2007    KNEE ARTHROSCOPY Right 5/24/2022    RIGHT KNEE ARTHROSCOPY WITH CHONDROPLASTY performed by Gaston Mathis DO at [address]      As a child up till 1992     Family History   Problem Relation Age of Onset    Cancer Mother     Heart Disease Mother     Cancer Father     Heart Disease Father      Social History     Socioeconomic History    Marital status:      Spouse name: Not on file    Number of children: Not on file    Years of education: Not on file    Highest education level: Not on file   Occupational History    Not on file   Tobacco Use    Smoking status: Never    Smokeless tobacco: Never   Vaping Use    Vaping Use: Never used   Substance and Sexual Activity    Alcohol use: No    Drug use: No    Sexual activity: Yes     Partners: Male   Other Topics Concern    Not on file   Social History Narrative    Not on file     Social Determinants of Health     Financial Resource Strain: Not on file   Food Insecurity: Not on file   Transportation Needs: Not on file   Physical Activity: Not on file   Stress: Not on file   Social Connections: Not on file   Intimate Partner Violence: Not on file   Housing Stability: Not on file     Current Facility-Administered Medications   Medication Dose Route Frequency Provider Last Rate Last Admin    ketorolac (TORADOL) injection 30 mg  30 mg IntraVENous Once Mustapha Franz DO        prochlorperazine (COMPAZINE) injection 10 mg  10 mg IntraVENous Once Jaja Contreras, DO         Current Outpatient Medications   Medication Sig Dispense Refill    naproxen (NAPROSYN) 500 MG tablet Take 1 tablet by mouth in the morning and 1 tablet before bedtime.  20 tablet 0    vitamin B-12 (CYANOCOBALAMIN) 1000 MCG tablet Take 1,000 mcg by mouth daily      Cholecalciferol (VITAMIN D3) 50 MCG (2000 UT) CAPS Take 1 capsule by mouth daily      SUMAtriptan (IMITREX) 100 MG tablet Take 1 tablet by mouth once as needed for Migraine 9 tablet 0    zolpidem (AMBIEN) 5 MG tablet Take 5 mg by mouth nightly. pantoprazole (PROTONIX) 40 MG tablet Take 40 mg by mouth daily      busPIRone (BUSPAR) 15 MG tablet Take 15 mg by mouth in the morning and at bedtime      acetaminophen (AMINOFEN) 325 MG tablet Take 2 tablets by mouth every 6 hours as needed for Pain 120 tablet 0    atorvastatin (LIPITOR) 20 MG tablet       escitalopram (LEXAPRO) 20 MG tablet Take 20 mg by mouth daily       Allergies   Allergen Reactions    Cyclobenzaprine      anger    Prednisone      Rapid heart rate    Trazodone      anger    Benadryl [Diphenhydramine] Anxiety     Severe anxiety and agitation. States can take it oral but not IV    Nsaids Nausea And Vomiting     Pt states she can tolerate toradol as of 12/1/21       Nursing Notes Reviewed    Physical Exam:  ED Triage Vitals   Enc Vitals Group      BP --       Heart Rate 07/22/22 1526 89      Resp 07/22/22 1526 18      Temp 07/22/22 1526 98.3 °F (36.8 °C)      Temp Source 07/22/22 1526 Oral      SpO2 --       Weight 07/22/22 1524 240 lb (108.9 kg)      Height 07/22/22 1524 5' (1.524 m)      Head Circumference --       Peak Flow --       Pain Score --       Pain Loc --       Pain Edu? --       Excl. in [address]? --      GENERAL APPEARANCE: Awake and alert. Cooperative. No acute distress. Nontoxic in appearance  HEAD: Normocephalic. Atraumatic. EYES: EOM's grossly intact. Sclera anicteric. ENT: Tolerates saliva. No trismus. NECK: Supple. Trachea midline. CARDIO: RRR. Radial pulse 2+. Chest wall is nontender  LUNGS: Respirations unlabored. CTAB. No wheezes rales or rhonchi  ABDOMEN: Soft. Non-distended. Non-tender. No guarding or rebound  EXTREMITIES: No acute deformities.   No pain to her calves  SKIN: Warm and dry. NEUROLOGICAL: No gross facial drooping. Moves all 4 extremities spontaneously. PSYCHIATRIC: Normal mood.      Labs:  Results for orders placed or performed during the hospital encounter of 07/22/22   Respiratory Panel, Molecular, with COVID-19 (Restricted: peds pts or suitable admitted adults)    Specimen: Nasopharyngeal   Result Value Ref Range    Adenovirus Detection by PCR NOT DETECTED NOT DETECTED    Coronavirus 229E PCR NOT DETECTED NOT DETECTED    Coronavirus HKU1 PCR NOT DETECTED NOT DETECTED    Coronavirus NL63 PCR NOT DETECTED NOT DETECTED    Coronavirus OC43 PCR NOT DETECTED NOT DETECTED    SARS-CoV-2 NOT DETECTED NOT DETECTED    Human Metapneumovirus PCR NOT DETECTED NOT DETECTED    Rhinovirus Enterovirus PCR NOT DETECTED NOT DETECTED    Influenza A by PCR NOT DETECTED NOT DETECTED    Influenza A H1 Pandemic PCR NOT DETECTED NOT DETECTED    Influenza A H1 (2009) PCR NOT DETECTED NOT DETECTED    Influenza A H3 PCR NOT DETECTED NOT DETECTED    Influenza B by PCR NOT DETECTED NOT DETECTED    Parainfluenza 1 PCR NOT DETECTED NOT DETECTED    Parainfluenza 2 PCR NOT DETECTED NOT DETECTED    Parainfluenza 3 PCR NOT DETECTED NOT DETECTED    Parainfluenza 4 PCR NOT DETECTED NOT DETECTED    RSV PCR NOT DETECTED NOT DETECTED    Bordetella parapertussis by PCR NOT DETECTED NOT DETECTED    B Pertussis by PCR NOT DETECTED NOT DETECTED    Chlamydophila Pneumonia PCR NOT DETECTED NOT DETECTED    Mycoplasma pneumo by PCR NOT DETECTED NOT DETECTED   Brain Natriuretic Peptide   Result Value Ref Range    Pro-BNP 25.02 <300 PG/ML   CBC with Auto Differential   Result Value Ref Range    WBC 7.4 4.0 - 10.5 K/CU MM    RBC 4.66 4.2 - 5.4 M/CU MM    Hemoglobin 13.6 12.5 - 16.0 GM/DL    Hematocrit 43.1 37 - 47 %    MCV 92.5 78 - 100 FL    MCH 29.2 27 - 31 PG    MCHC 31.6 (L) 32.0 - 36.0 %    RDW 13.3 11.7 - 14.9 %    Platelets 441 273 - 990 K/CU MM    MPV 11.2 (H) 7.5 - 11.1 FL    Differential Type AUTOMATED DIFFERENTIAL     Segs Relative 64.6 36 - 66 %    Lymphocytes % 24.1 24 - 44 %    Monocytes % 5.0 (H) 0 - 4 %    Eosinophils % 5.1 (H) 0 - 3 %    Basophils % 0.9 0 - 1 %    Segs Absolute 4.8 K/CU MM    Lymphocytes Absolute 1.8 K/CU MM    Monocytes Absolute 0.4 K/CU MM    Eosinophils Absolute 0.4 K/CU MM    Basophils Absolute 0.1 K/CU MM    Immature Neutrophil % 0.3 0 - 0.43 %    Total Immature Neutrophil 0.02 K/CU MM   Troponin   Result Value Ref Range    Troponin T <0.010 <0.01 NG/ML   Critical Care Panel   Result Value Ref Range    Sodium 142 135 - 145 MMOL/L    Potassium 4.0 3.5 - 5.1 MMOL/L    Chloride 103 99 - 110 mMol/L    CO2 25 21 - 32 MMOL/L    Anion Gap 14 4 - 16    BUN 11 6 - 23 MG/DL    Creatinine 0.7 0.6 - 1.1 MG/DL    Glucose 100 (H) 70 - 99 MG/DL    Calcium 9.4 8.3 - 10.6 MG/DL    GFR Non-African American >60 >60 mL/min/1.73m2    GFR African American >60 >60 mL/min/1.73m2    Phosphorus 3.2 2.5 - 4.9 MG/DL    Magnesium 2.1 1.8 - 2.4 mg/dl   D-Dimer, Quantitative   Result Value Ref Range    D-Dimer, Quant <200 <230 NG/mL(DDU)   EKG 12 Lead   Result Value Ref Range    Ventricular Rate 91 BPM    Atrial Rate 91 BPM    P-R Interval 122 ms    QRS Duration 80 ms    Q-T Interval 386 ms    QTc Calculation (Bazett) 474 ms    P Axis 42 degrees    R Axis 24 degrees    T Axis 27 degrees    Diagnosis       Normal sinus rhythm  Cannot rule out Anterior infarct , age undetermined  Abnormal ECG  When compared with ECG of 30-APR-2022 11:58,  No significant change was found  Confirmed by JUSTIN Donnelly ([id number]) on 7/22/2022 5:11:31 PM         EKG (if obtained): (All EKG's are interpreted by myself in the absence of a cardiologist)  The Ekg interpreted by me in the absence of a cardiologist shows. normal sinus rhythm with a rate of 91  Axis is   Normal  QTc is   474  Intervals and Durations are unremarkable. No specific ST-T wave changes appreciated. No evidence of acute ischemia.    No significant change from prior EKG dated 30 April 2022]    Radiographs (if obtained):  [] The following radiograph was interpreted by myself in the absence of a radiologist:  [x] Radiologist's Report reviewed at time of ED visit:  XR CHEST PORTABLE   Final Result   No acute abnormality. ED Course and MDM:  Patient presents to the ER complaining of chest pain. Here in the ER work-up is negative. D-dimer is normal cardiac work-up is normal EKG shows no acute findings chest x-ray is negative. She is complaining of heaviness in her chest and periodic tingling. She is wondering if this may be secondary to anxiety. She states she has a lot of stress at home. Right now the work-up is negative and I think she can be safely discharged. I will have her follow-up with her primary caregiver return to the emergency department if her condition worsens. Her heart score is 2. She is instructed to return for any worsening signs and symptoms and to follow-up with soon as possible. She is given a prescription for naproxen. Final Impression:  1. Chest pain, unspecified type      DISPOSITION Decision To Discharge    Patient referred to:  Susan Milian, Parker Yeung [address] [facility]  [phone]    Schedule an appointment as soon as possible for a visit in 1 week  For follow up    [facility] Emergency Department  [facility] [address]  [facility]  [phone]  Go to   If symptoms worsen  Discharge medications:  New Prescriptions    NAPROXEN (NAPROSYN) 500 MG TABLET    Take 1 tablet by mouth in the morning and 1 tablet before bedtime.      (Please note that portions of this note may have been completed with a voice recognition program. Efforts were made to edit the dictations but occasionally words are mis-transcribed.)    Etta Hernandez DO, [address]  Board certified in [address] [place] [address]  07/22/22 8912

## 2022-09-16 ENCOUNTER — HOSPITAL ENCOUNTER (EMERGENCY)
Age: 47
Discharge: HOME | End: 2022-09-16
Attending: EMERGENCY MEDICINE
Payer: COMMERCIAL

## 2022-09-16 VITALS
HEIGHT: 60 IN | RESPIRATION RATE: 18 BRPM | TEMPERATURE: 100.2 F | HEART RATE: 93 BPM | BODY MASS INDEX: 47.12 KG/M2 | SYSTOLIC BLOOD PRESSURE: 128 MMHG | DIASTOLIC BLOOD PRESSURE: 72 MMHG | OXYGEN SATURATION: 97 % | WEIGHT: 240 LBS

## 2022-09-16 DIAGNOSIS — U07.1 COVID-19: Primary | ICD-10-CM

## 2022-09-16 LAB — SARS-COV-2, NAAT: DETECTED

## 2022-09-16 PROCEDURE — 87635 SARS-COV-2 COVID-19 AMP PRB: CPT

## 2022-09-16 PROCEDURE — 99283 EMERGENCY DEPT VISIT LOW MDM: CPT

## 2022-09-16 PROCEDURE — 6370000000 HC RX 637 (ALT 250 FOR IP): Performed by: EMERGENCY MEDICINE

## 2022-09-16 RX ORDER — BENZONATATE 100 MG/1
100 CAPSULE ORAL ONCE
Status: COMPLETED | OUTPATIENT
Start: 2022-09-16 | End: 2022-09-16

## 2022-09-16 RX ORDER — BENZONATATE 100 MG/1
100 CAPSULE ORAL 3 TIMES DAILY PRN
Qty: 30 CAPSULE | Refills: 0 | Status: SHIPPED | OUTPATIENT
Start: 2022-09-16 | End: 2022-09-26

## 2022-09-16 RX ORDER — GUAIFENESIN 100 MG/5ML
200 SOLUTION ORAL ONCE
Status: COMPLETED | OUTPATIENT
Start: 2022-09-16 | End: 2022-09-16

## 2022-09-16 RX ORDER — IBUPROFEN 800 MG/1
800 TABLET ORAL EVERY 8 HOURS PRN
Qty: 24 TABLET | Refills: 0 | Status: SHIPPED | OUTPATIENT
Start: 2022-09-16

## 2022-09-16 RX ORDER — GUAIFENESIN/DEXTROMETHORPHAN 100-10MG/5
5 SYRUP ORAL 4 TIMES DAILY PRN
Qty: 120 ML | Refills: 0 | Status: SHIPPED | OUTPATIENT
Start: 2022-09-16 | End: 2022-09-26

## 2022-09-16 RX ORDER — IBUPROFEN 400 MG/1
800 TABLET ORAL ONCE
Status: COMPLETED | OUTPATIENT
Start: 2022-09-16 | End: 2022-09-16

## 2022-09-16 RX ADMIN — IBUPROFEN 800 MG: 400 TABLET, FILM COATED ORAL at 13:17

## 2022-09-16 RX ADMIN — BENZONATATE 100 MG: 100 CAPSULE ORAL at 13:17

## 2022-09-16 RX ADMIN — GUAIFENESIN 200 MG: 200 SOLUTION ORAL at 13:17

## 2022-09-16 ASSESSMENT — PAIN SCALES - GENERAL
PAINLEVEL_OUTOF10: 8
PAINLEVEL_OUTOF10: 8

## 2022-09-16 ASSESSMENT — PAIN - FUNCTIONAL ASSESSMENT: PAIN_FUNCTIONAL_ASSESSMENT: 0-10

## 2022-09-16 ASSESSMENT — PAIN DESCRIPTION - LOCATION
LOCATION: THROAT
LOCATION: THROAT

## 2022-09-16 NOTE — ED NOTES
Patient discharging home, AVS reviewed with no questions at this time. Patient instructed to follow upper discharge instructions and to return for worsening symptoms. Respirations equal and unlabored,skin PWD.      Joana Echavarria RN  09/16/22 5439

## 2022-09-16 NOTE — ED PROVIDER NOTES
EMERGENCY DEPARTMENT ENCOUNTER      CHIEF COMPLAINT:   Sore throat  Headache  Congestion  Short of breath    HPI: Ruchi Smith is a 41 y.o. female who presents to the ED complaining with multiple complaints including a headache, sore throat, decreased appetite, cough, shortness of breath and diarrhea. The patient states her symptoms started a few days ago. Her headache has been constant. She has a sore throat and a decreased appetite. She has an intermittent cough and has been short of breath. She was having diarrhea but this is improved. She rates her throat pain as 8 out of 10. She is able to eat, drink and swallow but it is painful. There are no exacerbating or alleviating factors. She denies any other complaints. REVIEW OF SYSTEMS:   Constitutional: See HPI  Eyes:  Denies change in visual acuity  HENT: See HPI  Respiratory: See HPI  Cardiovascular:  Denies chest pain or edema  GI: See HPI  :  Denies dysuria  Musculoskeletal: See HPI  Integument:  Denies rash  Neurologic: See HPI  \"Remaining review of systems reviewed and negative. I have reviewed the nursing triage documentation and agree unless otherwise noted below. \"      PAST MEDICAL HISTORY:   Past Medical History:   Diagnosis Date    Anxiety     Depression     GERD (gastroesophageal reflux disease)     Headache     migraines    Hyperlipidemia     Palpitation     PONV (postoperative nausea and vomiting)     Thyroid disease     Vitamin D deficiency        CURRENT MEDICATIONS:   Home medications reviewed.     SURGICAL HISTORY:   Past Surgical History:   Procedure Laterality Date    BREAST REDUCTION SURGERY Bilateral 2007    HYSTERECTOMY (CERVIX STATUS UNKNOWN)  2007    KNEE ARTHROSCOPY Right 5/24/2022    RIGHT KNEE ARTHROSCOPY WITH CHONDROPLASTY performed by Tory March DO at [place] [address]., [address]      As a child up till [facility]:   Family History   Problem Relation Age of Onset    Cancer Mother     Heart Disease Mother     Cancer Father     Heart Disease Father        SOCIAL HISTORY:   Social History     Socioeconomic History    Marital status:      Spouse name: Not on file    Number of children: Not on file    Years of education: Not on file    Highest education level: Not on file   Occupational History    Not on file   Tobacco Use    Smoking status: Never    Smokeless tobacco: Never   Vaping Use    Vaping Use: Never used   Substance and Sexual Activity    Alcohol use: No    Drug use: No    Sexual activity: Yes     Partners: Male   Other Topics Concern    Not on file   Social History Narrative    Not on file     Social Determinants of Health     Financial Resource Strain: Not on file   Food Insecurity: Not on file   Transportation Needs: Not on file   Physical Activity: Not on file   Stress: Not on file   Social Connections: Not on file   Intimate Partner Violence: Not on file   Housing Stability: Not on file       ALLERGIES: Cyclobenzaprine, Prednisone, Trazodone, Benadryl [diphenhydramine], and Nsaids    PHYSICAL EXAM:  VITAL SIGNS:   ED Triage Vitals [09/16/22 1156]   Enc Vitals Group      /72      Heart Rate 93      Resp 18      Temp 100.2 °F (37.9 °C)      Temp Source Oral      SpO2 97 %      Weight       Height       Head Circumference       Peak Flow       Pain Score       Pain Loc       Pain Edu? Excl. in [address]? Constitutional:  Non-toxic appearance, appears to not feel well  HENT: Normocephalic, Atraumatic, Bilateral external ears normal, Oropharynx moist, There is bilateral tonsillar erythema and swelling with no exudates, Uvula is midline, No asymmetry, No tongue elevation, Nose normal.  Eyes:  PERRL, Conjunctiva normal, No discharge. Neck: Normal range of motion, No tenderness, Supple, No stridor, No lymphadenopathy.   Cardiovascular:  Normal heart rate, Normal rhythm  Pulmonary/Chest:  Normal breath sounds, No respiratory distress, No wheezing  Abdomen: Bowel sounds normal, Soft, No tenderness, No masses, No pulsatile masses  Extremities:  Normal range of motion, Intact distal pulses, No edema, No tenderness  Skin:  Warm, Dry, No erythema, No rash      EKG Interpretation  None    Radiology / Procedures:  Labs Reviewed   COVID-19, RAPID - Abnormal; Notable for the following components:       Result Value    SARS-CoV-2, NAAT DETECTED (*)     All other components within normal limits       ED COURSE & MEDICAL DECISION MAKING:  Pertinent Labs & Imaging studies reviewed. (See chart for details)  On exam, the patient is afebrile and nontoxic appearing. She is hemodynamically stable and neurologically intact. Throat is erythematous with no asymmetry or tongue elevation. Labs are obtained and the patient is positive for COVID. She was treated with Robitussin, Tessalon Perles and Motrin. I suspect that the patient has symptoms due to COVID-19. I have a low suspicion for airway obstruction, epiglottitis, Jorge's angina, deep space infection of the neck, peritonsillar abscess, or sepsis. I feel that the patient is stable for outpatient management with follow up in 2-3 days. The patient is given return precautions. The patient verbalized understanding, was agreeable with plan, and the patient was discharged home in stable condition. Clinical Impression:  1.  COVID-19        Disposition referral (if applicable):  [place] [address] [facility] [address]  [phone]    Schedule an appointment as soon as possible for a visit       [facility] Emergency Department  [address] [place] [address]  [phone]  Go to   If symptoms worsen    Disposition medications (if applicable):  Discharge Medication List as of 9/16/2022  1:20 PM        START taking these medications    Details   ibuprofen (ADVIL;MOTRIN) 800 MG tablet Take 1 tablet by mouth every 8 hours as needed for Pain, Disp-24 tablet, R-0Normal benzonatate (TESSALON PERLES) 100 MG capsule Take 1 capsule by mouth 3 times daily as needed for Cough, Disp-30 capsule, R-0Normal      guaiFENesin-dextromethorphan (ROBITUSSIN DM) 100-10 MG/5ML syrup Take 5 mLs by mouth 4 times daily as needed for Cough, Disp-120 mL, R-0Normal               Comment: Please note this report has been produced using speech recognition software and may contain errors related to that system including errors in grammar, punctuation, and spelling, as well as words and phrases that may be inappropriate. If there are any questions or concerns please feel free to contact the dictating provider for clarification.           Mandy Hagen MD  09/20/22 5611

## 2022-10-04 ENCOUNTER — OFFICE VISIT MH/BH (OUTPATIENT)
Dept: BARIATRICS/WEIGHT MGMT | Age: 47
End: 2022-10-04
Payer: COMMERCIAL

## 2022-10-04 VITALS
SYSTOLIC BLOOD PRESSURE: 124 MMHG | DIASTOLIC BLOOD PRESSURE: 80 MMHG | HEART RATE: 88 BPM | BODY MASS INDEX: 48.79 KG/M2 | HEIGHT: 60 IN | WEIGHT: 248.5 LBS | OXYGEN SATURATION: 99 %

## 2022-10-04 DIAGNOSIS — E66.01 MORBID OBESITY WITH BMI OF 45.0-49.9, ADULT (HCC): Primary | ICD-10-CM

## 2022-10-04 DIAGNOSIS — Z79.899 MEDICATION MANAGEMENT: ICD-10-CM

## 2022-10-04 LAB
ALCOHOL URINE: NORMAL
AMPHETAMINE SCREEN, URINE: NEGATIVE
BARBITURATE SCREEN, URINE: NEGATIVE
BENZODIAZEPINE SCREEN, URINE: NEGATIVE
BUPRENORPHINE URINE: NEGATIVE
COCAINE METABOLITE SCREEN URINE: NEGATIVE
FENTANYL SCREEN, URINE: NORMAL
GABAPENTIN SCREEN, URINE: NORMAL
MDMA URINE: NEGATIVE
METHADONE SCREEN, URINE: NEGATIVE
METHAMPHETAMINE, URINE: NEGATIVE
OPIATE SCREEN URINE: NEGATIVE
OXYCODONE SCREEN URINE: NEGATIVE
PHENCYCLIDINE SCREEN URINE: NEGATIVE
PROPOXYPHENE SCREEN, URINE: NORMAL
SYNTHETIC CANNABINOIDS(K2) SCREEN, URINE: NORMAL
THC SCREEN, URINE: NEGATIVE
TRAMADOL SCREEN URINE: NORMAL
TRICYCLIC ANTIDEPRESSANTS, UR: NEGATIVE

## 2022-10-04 PROCEDURE — 1036F TOBACCO NON-USER: CPT | Performed by: NURSE PRACTITIONER

## 2022-10-04 PROCEDURE — 80305 DRUG TEST PRSMV DIR OPT OBS: CPT | Performed by: NURSE PRACTITIONER

## 2022-10-04 PROCEDURE — G8427 DOCREV CUR MEDS BY ELIG CLIN: HCPCS | Performed by: NURSE PRACTITIONER

## 2022-10-04 PROCEDURE — G8417 CALC BMI ABV UP PARAM F/U: HCPCS | Performed by: NURSE PRACTITIONER

## 2022-10-04 PROCEDURE — 99204 OFFICE O/P NEW MOD 45 MIN: CPT | Performed by: NURSE PRACTITIONER

## 2022-10-04 PROCEDURE — G8484 FLU IMMUNIZE NO ADMIN: HCPCS | Performed by: NURSE PRACTITIONER

## 2022-10-04 RX ORDER — PHENTERMINE HYDROCHLORIDE 37.5 MG/1
37.5 TABLET ORAL
Qty: 30 TABLET | Refills: 0 | Status: SHIPPED | OUTPATIENT
Start: 2022-10-04 | End: 2022-11-03

## 2022-10-04 ASSESSMENT — ENCOUNTER SYMPTOMS
EYES NEGATIVE: 1
GASTROINTESTINAL NEGATIVE: 1
RESPIRATORY NEGATIVE: 1
ALLERGIC/IMMUNOLOGIC NEGATIVE: 1

## 2022-10-04 NOTE — PROGRESS NOTES
Chief Complaint   Patient presents with    Weight Management     Np 1st med wm visit. Armando Carlos SUBJECTIVE:    HPI: Patient is here with complaints of weight gain and inability to lose weight per self. Inquiring about weight loss medications to assist with their weight loss goal.    Obesity with a BMI of Body mass index is 48.53 kg/m². Luc Levin Patient has failed to lose 5% of body weight for many years. Any history of glaucoma? Denies    Any history of drug abuse? Denies    Any history of CAD, uncontrolled HTN, arrhythmias, stroke, or CHF? ? Denies    Any history of hyperthyroidism? Hypothyroidism controlled with medication    Any current or recent use of MAOIs? Denies    Current dietary measures: not currently    Current exercise measures: not currently    I have reviewed the patient's(pertinent information to this visit) medical history, family history(scanned in  the [address] [place] [address] under \"patient questioner\"), social history and review of systems with the patient today in the office.           Past Surgical History:   Procedure Laterality Date    BREAST REDUCTION SURGERY Bilateral 2007    HYSTERECTOMY (CERVIX STATUS UNKNOWN)  2007    KNEE ARTHROSCOPY Right 5/24/2022    RIGHT KNEE ARTHROSCOPY WITH CHONDROPLASTY performed by Jose Eduardo Thacker DO at [address] [place] [address]      As a child up till 1992       Past Medical History:   Diagnosis Date    Anxiety     Depression     GERD (gastroesophageal reflux disease)     Headache     migraines    Hyperlipidemia     Palpitation     PONV (postoperative nausea and vomiting)     Thyroid disease     Vitamin D deficiency        Family History   Problem Relation Age of Onset    Cancer Mother     Heart Disease Mother     Cancer Father     Heart Disease Father        Social History     Socioeconomic History    Marital status:      Spouse name: Not on file    Number of children: Not on file    Years of education: Not on file    Highest education level: Not on file   Occupational History    Not on file   Tobacco Use    Smoking status: Never    Smokeless tobacco: Never   Vaping Use    Vaping Use: Never used   Substance and Sexual Activity    Alcohol use: No    Drug use: No    Sexual activity: Yes     Partners: Male   Other Topics Concern    Not on file   Social History Narrative    Not on file     Social Determinants of Health     Financial Resource Strain: Not on file   Food Insecurity: Not on file   Transportation Needs: Not on file   Physical Activity: Not on file   Stress: Not on file   Social Connections: Not on file   Intimate Partner Violence: Not on file   Housing Stability: Not on file       Current Outpatient Medications   Medication Sig Dispense Refill    phentermine (ADIPEX-P) 37.5 MG tablet Take 1 tablet by mouth every morning (before breakfast) for 30 days. BMI 44. 30 tablet 0    ibuprofen (ADVIL;MOTRIN) 800 MG tablet Take 1 tablet by mouth every 8 hours as needed for Pain 24 tablet 0    zolpidem (AMBIEN) 5 MG tablet Take 5 mg by mouth nightly. pantoprazole (PROTONIX) 40 MG tablet Take 40 mg by mouth daily      busPIRone (BUSPAR) 15 MG tablet Take 15 mg by mouth in the morning and at bedtime      acetaminophen (AMINOFEN) 325 MG tablet Take 2 tablets by mouth every 6 hours as needed for Pain 120 tablet 0    atorvastatin (LIPITOR) 20 MG tablet       escitalopram (LEXAPRO) 20 MG tablet Take 20 mg by mouth daily      SUMAtriptan (IMITREX) 100 MG tablet Take 1 tablet by mouth once as needed for Migraine 9 tablet 0     No current facility-administered medications for this visit. Allergies   Allergen Reactions    Cyclobenzaprine      anger    Prednisone      Rapid heart rate    Trazodone      anger    Benadryl [Diphenhydramine] Anxiety     Severe anxiety and agitation.  States can take it oral but not IV    Nsaids Nausea And Vomiting     Pt states she can tolerate toradol as of 12/1/21       Review of Systems: Review of Systems   Constitutional:  Positive for fatigue. HENT: Negative. Eyes: Negative. Respiratory: Negative. Cardiovascular: Negative. Gastrointestinal: Negative. Endocrine: Negative. Genitourinary: Negative. Musculoskeletal: Negative. Skin: Negative. Allergic/Immunologic: Negative. Neurological:  Positive for headaches. Hematological: Negative. Psychiatric/Behavioral:  The patient is nervous/anxious. OBJECTIVE:  Physical Exam:    /80 (Site: Right Upper Arm, Position: Sitting, Cuff Size: Medium Adult)   Pulse 88   Ht 5' (1.524 m)   Wt 248 lb 8 oz (112.7 kg)   SpO2 99%   BMI 48.53 kg/m²      Physical Exam  Constitutional:       Appearance: Normal appearance. She is obese. HENT:      Head: Normocephalic. Nose: Nose normal.      Mouth/Throat:      Pharynx: Oropharynx is clear. Eyes:      Conjunctiva/sclera: Conjunctivae normal.      Pupils: Pupils are equal, round, and reactive to light. Cardiovascular:      Rate and Rhythm: Normal rate. Pulses: Normal pulses. Pulmonary:      Effort: Pulmonary effort is normal.   Abdominal:      Palpations: Abdomen is soft. Musculoskeletal:         General: Normal range of motion. Cervical back: Normal range of motion. Skin:     General: Skin is warm and dry. Capillary Refill: Capillary refill takes less than 2 seconds. Neurological:      General: No focal deficit present. Mental Status: She is alert and oriented to person, place, and time. Psychiatric:         Mood and Affect: Mood normal.         Behavior: Behavior normal.         ASSESSMENT:  1. Morbid obesity with BMI of 45.0-49.9, adult ([facility] [address]  - Start tracking calories; about 0468-8435 daily  - 64 oz water daily  - Increase activity as able  - Referral RD    2.  Medication management  - Recent lab results reviewed  - Recent EKG reviewed  - UDS done in office and negative  - Meets criteria to start Adipex  - Start in am and take as directed  - Call for any concerns or side effects  - RTC one month with weight loss    PLAN:    - Recent Labs, UDS, and EKG reviewed and WNL    - Call for adverse side effects or concerns    - BMI is over 30, or over 27 with weight-related risk factors. - Pt aware Adipex can only be used short term (3 months). - Pt aware no breaks in treatment allowed or must be off for 6 months. - Pt aware that weight must be lost at each visit or medication will be discontinued. - Pt is aware that in order to be successful, must combine Adipex with appropriate diet and exercise plan. - Pt provided with medication handout that covers use; side effects (common side effects include insomnia, dry mouth, constipation, nervousness, increased heart rate, hypertension);    precautions; and interactions. - Pt aware must meet monthly in person while on this medication.     - Check [employer] [facility] Reporting System. Any concerns: NONE Reported      Current birth control measures include: hysterectomy    - If elderly or renal impairment, will use lower dose (15 mg daily) and avoid all together if GFR is less than 15. N/A     - RTC in one month    Orders Placed This Encounter   Procedures    Amb Referral to Nutrition Services    POCT Rapid Drug Screen          Orders Placed This Encounter   Medications    phentermine (ADIPEX-P) 37.5 MG tablet     Sig: Take 1 tablet by mouth every morning (before breakfast) for 30 days. BMI 44. Dispense:  30 tablet     Refill:  0          Follow Up:  Return in about 1 month (around 11/4/2022).       Margret Washburn, APRN - CNP

## 2022-10-20 ENCOUNTER — PATIENT MESSAGE (OUTPATIENT)
Dept: BARIATRICS/WEIGHT MGMT | Age: 47
End: 2022-10-20

## 2022-10-21 NOTE — TELEPHONE ENCOUNTER
Spoke with Hubert Peñaloza, suggested taking 1/2 dose and increasing her water intake to get at least 64 oz. Patient stated she already gets at least 64 oz of water and she will call the office if the symptoms still occur. I reminded the patient of the follow up appointment on 11/01/2022 @ 11:45.

## 2022-10-21 NOTE — TELEPHONE ENCOUNTER
From: Annamaria Fonseca  To: Heidy Avilapia  Sent: 10/20/2022 9:54 PM EDT  Subject: Adapex    Good evening! I wanted to send you a message on the adapex. I am feeling pretty bad in my chest. My heart seems to be racing alot. I can't eat I am nauseous. Last night I was having pains in my chest finally got it to calm down. I feel very exhausted but then again it's hard to sleep. I am just feeling strange not sure how to explain in. I tried giving it time but it just seems yo not get any better. Should I stop taking the adapex? Is there something else I could try? I feel like I am giving up and that's not what I want. I just hate this feeling especially having anxiety pretty bad anyway. What can we do? Sorry to message so late.

## 2022-11-01 DIAGNOSIS — Z00.8 EVALUATION BY PSYCHIATRIC SERVICE REQUIRED: Primary | ICD-10-CM

## 2022-11-30 ENCOUNTER — HOSPITAL ENCOUNTER (EMERGENCY)
Age: 47
Discharge: HOME | End: 2022-11-30
Payer: COMMERCIAL

## 2022-11-30 VITALS
BODY MASS INDEX: 45.31 KG/M2 | TEMPERATURE: 97.9 F | RESPIRATION RATE: 16 BRPM | SYSTOLIC BLOOD PRESSURE: 136 MMHG | HEART RATE: 94 BPM | DIASTOLIC BLOOD PRESSURE: 74 MMHG | HEIGHT: 61 IN | OXYGEN SATURATION: 98 % | WEIGHT: 240 LBS

## 2022-11-30 DIAGNOSIS — F41.1 GENERALIZED ANXIETY DISORDER: Primary | ICD-10-CM

## 2022-11-30 DIAGNOSIS — F32.A DEPRESSION, UNSPECIFIED DEPRESSION TYPE: ICD-10-CM

## 2022-11-30 PROCEDURE — 99283 EMERGENCY DEPT VISIT LOW MDM: CPT

## 2022-11-30 RX ORDER — LORAZEPAM 1 MG/1
1 TABLET ORAL 3 TIMES DAILY PRN
Qty: 12 TABLET | Refills: 0 | Status: SHIPPED | OUTPATIENT
Start: 2022-11-30 | End: 2022-12-05

## 2022-11-30 NOTE — ED NOTES
Ramez Metzger,  with mental health services at bedside at this time.       Bravo Nicolas RN  11/30/22 6328

## 2022-11-30 NOTE — ED NOTES
Chief Complaint:      Anxiety and depression    Provisional Diagnosis:   Hx anxiety and depression     Risk, Psychosocial and Contextual Factors: (homeless, lack of social support etc.):       Current MH Treatment:     Outpatient appointment 12/08    Present Suicidal Behavior:    Verbal:  Denies   Attempt:  Denies     Access to Weapons:  Denies     C-SSRS Current Suicide Risk: Low, Moderate or High:      No risk     Past Suicidal Behavior:    Verbal:  Denies   Attempts:  Denies     Self-Injurious/Self-Mutilation: (Specify)  Denies     Traumatic Event Within Past 2 Weeks: (Specify)   Denies     Current Abuse:  (Specify)  Denies     Legal: (Specify)  Denies     Violence: (Specify)  Denies     Protective Factors: Outpatient david in a week    Risk Factors:   Hx anxiety and depression     Clinical Summary:    Pt presents to ED with anxiety and depression. States that she has an outpatient appointment on 12/08 but would like a few meds to help her manage until then. Denies SAHRA  Denies AVH  States her sleep and appetite are okay and normal   AO4  Denies drug and alcohol use     Pt is calm and cooperative   Good eye contact  Fair insight and judgment     MSW provided resources and went over pt options re outpatient medication management and therapist. Discussed that current outpatient appointment is most likely the earliest that she can get in but discussed other outpatient providers. Pt verbalized understanding. Pt is adamant that she is not suicidal or homicidal. Adamant that she is safe for discharge. Encouraged pt to return to hospital if she begins to feel suicidal or homicidal. Pt verbalized understanding and agreement. Level of Care Disposition:      Consulted with medical provider. Patient is medically stabilized. Consulted with patients RN about abnormalities or medical concerns. No abnormalities or medical concerns noted. Consulted with BENJAMIN Barboza.  Patient to be discharged and is to follow up with outpatient mental health.             BENJY Blake  11/30/22 [id number]

## 2022-11-30 NOTE — ED PROVIDER NOTES
[address] ENCOUNTER        Pt Name: Devan Carrillo  MRN: [id number]  [place] 1975  Date of evaluation: 11/30/2022  Provider: JOSÉ Barrios - BENJAMIN  PCP: KAREN Arroyo     I have discussed the care of this patient with my supervising physician Dr. Xiomara Santana who is in agreement with the plan of care. Triage CHIEF COMPLAINT       Chief Complaint   Patient presents with    Anxiety         HISTORY OF PRESENT ILLNESS      Chief Complaint: worsening anxiety/depression symptoms    Devan Carrillo is a 41 y.o. female who presents for evaluation of worsening symptoms of anxiety and depression for one week. She has a history of anxiety and depression, is presenting taking buspar and lexapro. She has been feeling a sense of numbness emotionally, having difficulty sleeping, and panic attacks. She has bene communicating with her PCP these symptoms, PCP recommended she [facility] to [facility] but she does not want to do inpatient care so PCP recommended she come to ED for evaluation. She denies any specific suicidal intent but states \"sometimes I wish I could quit breathing\". No HI, hallucinations. She has been having issues recently of feeling paranoid that people are out to get her. Denies alcohol, illicit drug use. Nursing Notes were all reviewed and agreed with or any disagreements were addressed in the HPI.     REVIEW OF SYSTEMS     Constitutional:   Denies fever, chills, weight loss or weakness   HENT:   Denies URI symptoms  Cardiovascular:   Denies chest pain, palpitations   Respiratory:  Denies cough or shortness of breath    GI:   Denies abdominal pain, nausea, vomiting, or diarrhea  Musculoskeletal:   Denies neck, back, or extremity pain  Skin:   Denies rash  Neurologic:   Denies headache, focal weakness or sensory changes     PAST MEDICAL HISTORY     Past Medical History:   Diagnosis Date    Anxiety Depression     GERD (gastroesophageal reflux disease)     Headache     migraines    Hyperlipidemia     Palpitation     PONV (postoperative nausea and vomiting)     Thyroid disease     Vitamin D deficiency        SURGICAL HISTORY     Past Surgical History:   Procedure Laterality Date    BREAST REDUCTION SURGERY Bilateral 2007    HYSTERECTOMY (CERVIX STATUS UNKNOWN)  2007    KNEE ARTHROSCOPY Right 5/24/2022    RIGHT KNEE ARTHROSCOPY WITH CHONDROPLASTY performed by Mayur Portillo DO at [facility]      As a child up till [address]       Previous Medications    ACETAMINOPHEN (AMINOFEN) 325 MG TABLET    Take 2 tablets by mouth every 6 hours as needed for Pain    ATORVASTATIN (LIPITOR) 20 MG TABLET        BUSPIRONE (BUSPAR) 15 MG TABLET    Take 15 mg by mouth in the morning and at bedtime    ESCITALOPRAM (LEXAPRO) 20 MG TABLET    Take 20 mg by mouth daily    IBUPROFEN (ADVIL;MOTRIN) 800 MG TABLET    Take 1 tablet by mouth every 8 hours as needed for Pain    PANTOPRAZOLE (PROTONIX) 40 MG TABLET    Take 40 mg by mouth daily    SUMATRIPTAN (IMITREX) 100 MG TABLET    Take 1 tablet by mouth once as needed for Migraine    ZOLPIDEM (AMBIEN) 5 MG TABLET    Take 5 mg by mouth nightly.         ALLERGIES     Cyclobenzaprine, Prednisone, Trazodone, Benadryl [diphenhydramine], and Nsaids    FAMILYHISTORY       Family History   Problem Relation Age of Onset    Cancer Mother     Heart Disease Mother     Cancer Father     Heart Disease Father         SOCIAL HISTORY       Social History     Socioeconomic History    Marital status:      Spouse name: None    Number of children: None    Years of education: None    Highest education level: None   Tobacco Use    Smoking status: Never    Smokeless tobacco: Never   Vaping Use    Vaping Use: Never used   Substance and Sexual Activity    Alcohol use: No    Drug use: No    Sexual activity: Yes     Partners: Male       SCREENINGS    Farnaz Coma Scale  Eye Opening: Spontaneous  Best Verbal Response: Oriented  Best Motor Response: Obeys commands  Farnaz Coma Scale Score: 15      PHYSICAL EXAM       ED Triage Vitals [11/30/22 1320]   BP Temp Temp Source Heart Rate Resp SpO2 Height Weight   136/74 97.9 °F (36.6 °C) Oral 94 16 98 % 5' 1\" (1.549 m) 240 lb (108.9 kg)      Constitutional:  Well developed, Well nourished. No distress  HENT:  Normocephalic, Atraumatic  Neck/Lymphatics: supple  Cardiovascular:   RRR,  no murmurs/rubs/gallops. Respiratory:  Nonlabored breathing. Normal breath sounds, No wheezing  Abdomen: Bowel sounds normal, Soft, No tenderness, no masses. Musculoskeletal:  Bilateral upper and lower extremity ROM intact without pain or obvious deficit  Integument:   Warm, Dry, No rashes. Neurologic:  Alert & oriented , No focal deficits noted. Psychiatric:  Affect normal, Mood normal.     DIAGNOSTIC RESULTS   LABS:    Labs Reviewed - No data to display    When ordered, only abnormal lab results are displayed. All other labs were within normal range or not returned as of this dictation. EKG: When ordered, EKG's are interpreted by the Emergency Department Physician in the absence of a cardiologist.  Please see their note for interpretation of EKG. RADIOLOGY:   Non-plain film images such as CT, Ultrasound and MRI are read by the radiologist. Plain radiographic images are visualized and preliminarily interpreted by the  ED Provider with the below findings:    Interpretation perthe Radiologist below, if available at the time of this note:    No orders to display     No results found.       PROCEDURES   Unless otherwise noted below, none         CRITICAL CARE   CRITICAL CARE NOTE:  N/A    CONSULTS:  IP CONSULT TO PSYCHOLOGY      EMERGENCY DEPARTMENT COURSE and MDM:   Vitals:    Vitals:    11/30/22 1320   BP: 136/74   Pulse: 94   Resp: 16   Temp: 97.9 °F (36.6 °C)   TempSrc: Oral   SpO2: 98%   Weight: 240 lb (108.9 kg)   Height: 5' 1\" (1.549 m)       Patient was given thefollowing medications:  Medications - No data to display      Is this patient to be included in the SEP-1 Core Measure due to severe sepsis or septic shock? No   Exclusion criteria - the patient is NOT to be included for SEP-1 Core Measure due to: Infection is not suspected    MDM:  Patient presents as above. Emergent etiologies considered. Patient seen and examined. Work-up initiated secondary to presentation, physical exam findings, vital signs and medical chart review. In brief, patient presented for evaluation of worsening anxiety and depression symptoms over the course of the past week. She is already medicated with BuSpar and Lexapro but feels they are inadequately managing her symptoms. She has been communicating with primary care who is unfortunately on vacation this week thus suggested she come in for evaluation. During my history, she denied any SI/HI. She expressed concern particularly over her anxiety being out of control and states in the past she had Ativan that she could take occasionally as needed but had not needed that for a long period of time and thus her primary care had not refilled it. She was evaluated by the mental health crisis counselor who agreed that the patient is not actively suicidal or homicidal and would be appropriate for discharge. She also feels that it is safe for the patient to wait a primary care appointment next week if we were able to provide her a small amount of medication to help with symptom control until she can be seen. I feel comfortable prescribing the patient a small amount of Ativan and feels safe with her being discharged. Encouraged her to return for reevaluation immediately if she develops any worsening symptoms, thoughts of self-harm or harming anyone else. CLINICAL IMPRESSION      1. Generalized anxiety disorder    2.  Depression, unspecified depression type DISPOSITION/PLAN   DISPOSITION Decision To Discharge 11/30/2022 04:01:18 PM      PATIENT REFERREDTO:  No follow-up provider specified.     DISCHARGE MEDICATIONS:  New Prescriptions    No medications on file       DISCONTINUED MEDICATIONS:  Discontinued Medications    No medications on file              (Please note that portions ofthis note were completed with a voice recognition program.  Efforts were made to edit the dictations but occasionally words are mis-transcribed.)    JOSÉ Lopez CNP (electronically signed)             JOSÉ Baptiste CNP  11/30/22 [facility], APRN - CNP  11/30/22 0804

## 2022-11-30 NOTE — ED NOTES
Patient discharged to home at this time. Discharge instructions and follow up care discussed, patient voices understanding.       Adrian Wells RN  11/30/22 3606

## 2022-12-15 ENCOUNTER — HOSPITAL ENCOUNTER (EMERGENCY)
Age: 47
Discharge: HOME | End: 2022-12-15
Attending: EMERGENCY MEDICINE
Payer: COMMERCIAL

## 2022-12-15 ENCOUNTER — APPOINTMENT (OUTPATIENT)
Dept: GENERAL RADIOLOGY | Age: 47
End: 2022-12-15
Payer: COMMERCIAL

## 2022-12-15 VITALS
OXYGEN SATURATION: 96 % | RESPIRATION RATE: 18 BRPM | SYSTOLIC BLOOD PRESSURE: 145 MMHG | DIASTOLIC BLOOD PRESSURE: 89 MMHG | HEART RATE: 94 BPM | TEMPERATURE: 98 F | HEIGHT: 61 IN | WEIGHT: 249 LBS | BODY MASS INDEX: 47.01 KG/M2

## 2022-12-15 DIAGNOSIS — S39.012A LUMBOSACRAL STRAIN, INITIAL ENCOUNTER: ICD-10-CM

## 2022-12-15 DIAGNOSIS — M62.838 SPASM OF MUSCLE: ICD-10-CM

## 2022-12-15 DIAGNOSIS — S29.019A THORACIC MYOFASCIAL STRAIN, INITIAL ENCOUNTER: Primary | ICD-10-CM

## 2022-12-15 PROCEDURE — 6370000000 HC RX 637 (ALT 250 FOR IP): Performed by: EMERGENCY MEDICINE

## 2022-12-15 PROCEDURE — 72072 X-RAY EXAM THORAC SPINE 3VWS: CPT

## 2022-12-15 PROCEDURE — 72100 X-RAY EXAM L-S SPINE 2/3 VWS: CPT

## 2022-12-15 PROCEDURE — 99283 EMERGENCY DEPT VISIT LOW MDM: CPT

## 2022-12-15 RX ORDER — TIZANIDINE 4 MG/1
4 TABLET ORAL ONCE
Status: COMPLETED | OUTPATIENT
Start: 2022-12-15 | End: 2022-12-15

## 2022-12-15 RX ORDER — LORAZEPAM 1 MG/1
1 TABLET ORAL EVERY 8 HOURS PRN
COMMUNITY
Start: 2022-12-08 | End: 2022-12-18

## 2022-12-15 RX ORDER — HYDROCODONE BITARTRATE AND ACETAMINOPHEN 5; 325 MG/1; MG/1
1-2 TABLET ORAL EVERY 8 HOURS PRN
Qty: 12 TABLET | Refills: 0 | Status: SHIPPED | OUTPATIENT
Start: 2022-12-15 | End: 2022-12-18

## 2022-12-15 RX ORDER — TIZANIDINE 4 MG/1
4 TABLET ORAL EVERY 8 HOURS PRN
Qty: 9 TABLET | Refills: 0 | Status: SHIPPED | OUTPATIENT
Start: 2022-12-15 | End: 2022-12-18

## 2022-12-15 RX ORDER — HYDROCODONE BITARTRATE AND ACETAMINOPHEN 5; 325 MG/1; MG/1
2 TABLET ORAL ONCE
Status: COMPLETED | OUTPATIENT
Start: 2022-12-15 | End: 2022-12-15

## 2022-12-15 RX ORDER — QUETIAPINE FUMARATE 25 MG/1
25 TABLET, FILM COATED ORAL NIGHTLY
COMMUNITY
Start: 2022-12-08

## 2022-12-15 RX ADMIN — HYDROCODONE BITARTRATE AND ACETAMINOPHEN 2 TABLET: 5; 325 TABLET ORAL at 19:46

## 2022-12-15 RX ADMIN — TIZANIDINE 4 MG: 4 TABLET ORAL at 19:46

## 2022-12-15 ASSESSMENT — ENCOUNTER SYMPTOMS
BOWEL INCONTINENCE: 0
EYES NEGATIVE: 1
GASTROINTESTINAL NEGATIVE: 1
ABDOMINAL PAIN: 0
RESPIRATORY NEGATIVE: 1
ABDOMINAL SWELLING: 0
BACK PAIN: 1

## 2022-12-15 ASSESSMENT — PAIN DESCRIPTION - ORIENTATION: ORIENTATION: UPPER

## 2022-12-15 ASSESSMENT — PAIN SCALES - GENERAL
PAINLEVEL_OUTOF10: 8
PAINLEVEL_OUTOF10: 8

## 2022-12-15 ASSESSMENT — PAIN DESCRIPTION - LOCATION: LOCATION: BACK

## 2022-12-15 ASSESSMENT — PAIN DESCRIPTION - DESCRIPTORS: DESCRIPTORS: SHARP;ACHING

## 2022-12-15 ASSESSMENT — PAIN - FUNCTIONAL ASSESSMENT: PAIN_FUNCTIONAL_ASSESSMENT: 0-10
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
## 2022-12-16 NOTE — ED NOTES
Pt discharged with instructions and prescriptions. Discussed when and how to take medications and pt stated understanding.   Pt walked out of the [id number], RN  12/15/22 2025

## 2023-03-30 ENCOUNTER — HOSPITAL ENCOUNTER (EMERGENCY)
Age: 48
Discharge: HOME | End: 2023-03-30
Attending: STUDENT IN AN ORGANIZED HEALTH CARE EDUCATION/TRAINING PROGRAM
Payer: COMMERCIAL

## 2023-03-30 VITALS
RESPIRATION RATE: 18 BRPM | HEART RATE: 98 BPM | DIASTOLIC BLOOD PRESSURE: 79 MMHG | TEMPERATURE: 98 F | SYSTOLIC BLOOD PRESSURE: 139 MMHG | OXYGEN SATURATION: 100 %

## 2023-03-30 DIAGNOSIS — J06.9 ACUTE UPPER RESPIRATORY INFECTION: Primary | ICD-10-CM

## 2023-03-30 DIAGNOSIS — J02.9 ACUTE PHARYNGITIS, UNSPECIFIED ETIOLOGY: ICD-10-CM

## 2023-03-30 LAB
SARS-COV-2 RDRP RESP QL NAA+PROBE: NOT DETECTED
SOURCE: NORMAL

## 2023-03-30 PROCEDURE — 99283 EMERGENCY DEPT VISIT LOW MDM: CPT

## 2023-03-30 PROCEDURE — 87430 STREP A AG IA: CPT

## 2023-03-30 PROCEDURE — 87635 SARS-COV-2 COVID-19 AMP PRB: CPT

## 2023-03-30 PROCEDURE — 87081 CULTURE SCREEN ONLY: CPT

## 2023-03-30 PROCEDURE — 6360000002 HC RX W HCPCS: Performed by: STUDENT IN AN ORGANIZED HEALTH CARE EDUCATION/TRAINING PROGRAM

## 2023-03-30 PROCEDURE — 6370000000 HC RX 637 (ALT 250 FOR IP): Performed by: STUDENT IN AN ORGANIZED HEALTH CARE EDUCATION/TRAINING PROGRAM

## 2023-03-30 RX ORDER — KETOROLAC TROMETHAMINE 15 MG/ML
15 INJECTION, SOLUTION INTRAMUSCULAR; INTRAVENOUS ONCE
Status: DISCONTINUED | OUTPATIENT
Start: 2023-03-30 | End: 2023-03-30

## 2023-03-30 RX ORDER — IBUPROFEN 600 MG/1
600 TABLET ORAL ONCE
Status: COMPLETED | OUTPATIENT
Start: 2023-03-30 | End: 2023-03-30

## 2023-03-30 RX ADMIN — DEXAMETHASONE 10 MG: 4 TABLET ORAL at 19:16

## 2023-03-30 RX ADMIN — IBUPROFEN 600 MG: 600 TABLET, FILM COATED ORAL at 19:16

## 2023-03-30 ASSESSMENT — PAIN - FUNCTIONAL ASSESSMENT
PAIN_FUNCTIONAL_ASSESSMENT: 0-10
PAIN_FUNCTIONAL_ASSESSMENT: 0-10

## 2023-03-30 ASSESSMENT — PAIN SCALES - GENERAL
PAINLEVEL_OUTOF10: 7

## 2023-03-30 ASSESSMENT — PAIN DESCRIPTION - DESCRIPTORS
DESCRIPTORS: PRESSURE
DESCRIPTORS: PRESSURE

## 2023-03-30 ASSESSMENT — ENCOUNTER SYMPTOMS
SHORTNESS OF BREATH: 0
SORE THROAT: 1
RHINORRHEA: 1
TROUBLE SWALLOWING: 0
VOICE CHANGE: 0
CHEST TIGHTNESS: 0

## 2023-03-30 ASSESSMENT — PAIN DESCRIPTION - LOCATION
LOCATION: THROAT
LOCATION: THROAT

## 2023-03-30 ASSESSMENT — PAIN DESCRIPTION - PAIN TYPE: TYPE: ACUTE PAIN

## 2023-03-30 NOTE — DISCHARGE INSTRUCTIONS
Acupuncture/Traditional Chinese Medicine Initial Evaluation     INFORMED CONSENT - VISIT COUNT  1   Informed consent provided and discussed:  [x]  YES    []  NO    PRECAUTIONS:    · Precautions: none  SUBJECTIVE:     Current Symptoms: Yolanda was referred for acupuncture for stress related to diagnosis and treatment and lower abdominal pain due to urethral stents and recent major abdominal debulking surgery.  She feels she can hardly sleep due to the pain and stress.  \"I'm so tired.  How can I fight this if I'm so tired\"  Integrative Therapy Outcomes    Treatment Provided    Questions Answered By: Patient    Integrative Therapy Provided: Acupuncture  (Select the primary treatment provided)    Setting: [facility] Clinic  (Outpatient includes: all hospital and clinic-based outpatient)    Primary Complaint/Reason for Treatment: Pain    Secondary Complaint/Reason for Treatment:: Stress      Pre-Treatment Assessment    Pain: 6  (Please rate your current pain on a scale of 0-10 (0 is no pain, 10 is the worst pain))    Stress: 6  (Please rate your current stress on a scale of 0-10 (0 is no stress, 10 is the worst stress))    Neuropathy: 0  (Please rate your current neuropathy (numbness/tingling) on a scale of 0-10 (0 is no numbness/tingling, 10 is worst numbness/tingling))    Nausea: 0  (Please rate your current nausea on a scale 0-10 (0 is no nausea, 10 is worst nausea))    Post-Treatment Assessment    Pain: 5  (What is your pain level after your treatment? Please rate it on a scale of 0-10 (0 is no pain, 10 is the worst pain))    Stress: 3  What is your stress level after your treatment? Please rate it on a scale of 0-10 (0 is no stress, 10 is the worst stress))    Neuropathy: 0  (What is your numbness or tingling level after your treatment?  Please rate it on a scale of 0-10 (0 is no numbness/tingling, 10 is the worst numbness/tingling))    Nausea: 0  (What is your nausea level after your treatment?  Please rate it on a  You were seen today for an upper respiratory infection. Your covid and strep testing was negative. You received toradol as well as decadron for your symptoms. You may continue to take ibuprofen and tylenol for pain at home. Please follow up with your primary care physician. scale of 0-10 (0 is no nausea, 10 is the worst nausea))          Patient Goal(s):   1. To minimize pain  2. To manage stress response   3. To improve overall well being    OBJECTIVE:   Physical Inspection: Observations/Measurements  Alert, pleasant  Tongue: pale-dusky, quiver    Pulses:  weak    Traditional Chinese Medicine (TCM) Diagnosis:     · Qi and Blood Deficiency with Stagnation and stasis    TODAY'S ACUPUNCTURE/TCM TREATMENT:     Acupuncture: Meridians/Points utilized  Yintang, auricular shenmen R, anmian, LV 5, 8 LI 4, HT 7, GB 34, ST 36, SP 6, LV 3    Acupuncture:  Number of needles used: 20 Number of needles out:20    Other Modalities Utilized:Ear Seeds  Left Shenmen, point zero  Ear Seed Placement  Ear inspected and found to be clear of rash or broken skin  Area of ear prepped with alcohol wipe   Ear pellet/seeds placed at chosen points    Instructions given to patient:    Ear Seeds/Pellets for extended symptom relief  Only one ear is required to achieve the effect desired. Ear seeds can remain in place for up to five days for a sustained response. After 5 days of wear, remove seeds and allow the skin to rejuvenate for 8-24 hours before replacing with new ear seeds. Do not reuse the ear seeds.    Side Effects:  Ear seed acupressure is a safe method of treatment, but occasionally there may be some soreness and irritation in the area of the ear being treated. If skin irritation occurs from the ear seeds, or if discomfort or swelling should occur, remove the ear seeds immediately and dispose. On rare occasion, some extremely sensitive people may experience slight nausea or dizziness upon application. This should disappear within the first few minutes after application.    Contraindications:  Avoid use of ear seeds over active skin infection or trauma  Anyone with latex allergy should not receive ear seeds with tan adhesive        Post Treatment Comments:  I feel better, less stressed      Patient Education:    · Nutritional Recommendations   Discussed Acupuncture and potential plan of care  · Patient Understanding of Education: Verbalizes understanding of education.    TCM Prognosis:       · Patient would benefit from skilled Acupuncture/TCM therapy to achieve stated goals  · Potential Resolution of chief complaint is good.        PLAN OF CARE:   · Frequency/Duration: 1x/week  · The plan of care and goals were established with the patient who concurs.      Acupuncture/TCM Daily Billing:    Acupuncture CPT Codes reflect billing claims  Timed Procedures:  · Acupuncture CPT Codes  Untimed Procedures:  · Initial Evaluation CPT Code  Total Time per CPT Codes: 45 minutes

## 2023-03-30 NOTE — Clinical Note
Yvonne Kumar was seen and treated in our emergency department on 3/30/2023. She may return to work on 04/01/2023. If you have any questions or concerns, please don't hesitate to call.       Sana Hobson MD

## 2023-03-30 NOTE — ED PROVIDER NOTES
03/30/2023 07:21:35 PM      Comment: Please note this report has been produced using speech recognition software and may contain errors related to that system including errors in grammar, punctuation, and spelling, as well as words and phrases that may be inappropriate. Efforts were made to edit the dictations.         Sachin Garduno MD  03/30/23 Mauri Woodard MD  03/30/23 6029

## 2023-04-02 LAB
CULTURE: NORMAL
Lab: NORMAL
SPECIMEN: NORMAL
STREP A DIRECT SCREEN: NEGATIVE

## 2023-04-07 ENCOUNTER — APPOINTMENT (OUTPATIENT)
Dept: GENERAL RADIOLOGY | Age: 48
End: 2023-04-07
Payer: COMMERCIAL

## 2023-04-07 ENCOUNTER — HOSPITAL ENCOUNTER (EMERGENCY)
Age: 48
Discharge: HOME | End: 2023-04-07
Attending: EMERGENCY MEDICINE
Payer: COMMERCIAL

## 2023-04-07 VITALS
RESPIRATION RATE: 20 BRPM | DIASTOLIC BLOOD PRESSURE: 86 MMHG | HEART RATE: 96 BPM | WEIGHT: 259 LBS | BODY MASS INDEX: 48.9 KG/M2 | TEMPERATURE: 98.6 F | HEIGHT: 61 IN | OXYGEN SATURATION: 98 % | SYSTOLIC BLOOD PRESSURE: 118 MMHG

## 2023-04-07 DIAGNOSIS — J18.9 PNEUMONIA DUE TO INFECTIOUS ORGANISM, UNSPECIFIED LATERALITY, UNSPECIFIED PART OF LUNG: Primary | ICD-10-CM

## 2023-04-07 DIAGNOSIS — H93.8X3 CONGESTION OF BOTH EARS: ICD-10-CM

## 2023-04-07 LAB
B PARAP IS1001 DNA NPH QL NAA+NON-PROBE: NOT DETECTED
B PERT.PT PRMT NPH QL NAA+NON-PROBE: NOT DETECTED
C PNEUM DNA NPH QL NAA+NON-PROBE: NOT DETECTED
FLUAV H1 2009 PAN RNA NPH NAA+NON-PROBE: NOT DETECTED
FLUAV H1 RNA NPH QL NAA+NON-PROBE: NOT DETECTED
FLUAV H3 RNA NPH QL NAA+NON-PROBE: NOT DETECTED
FLUAV RNA NPH QL NAA+NON-PROBE: NOT DETECTED
FLUBV RNA NPH QL NAA+NON-PROBE: NOT DETECTED
HADV DNA NPH QL NAA+NON-PROBE: NOT DETECTED
HCOV 229E RNA NPH QL NAA+NON-PROBE: NOT DETECTED
HCOV HKU1 RNA NPH QL NAA+NON-PROBE: NOT DETECTED
HCOV NL63 RNA NPH QL NAA+NON-PROBE: NOT DETECTED
HCOV OC43 RNA NPH QL NAA+NON-PROBE: NOT DETECTED
HMPV RNA NPH QL NAA+NON-PROBE: NOT DETECTED
HPIV1 RNA NPH QL NAA+NON-PROBE: NOT DETECTED
HPIV2 RNA NPH QL NAA+NON-PROBE: NOT DETECTED
HPIV3 RNA NPH QL NAA+NON-PROBE: NOT DETECTED
HPIV4 RNA NPH QL NAA+NON-PROBE: NOT DETECTED
M PNEUMO DNA NPH QL NAA+NON-PROBE: NOT DETECTED
RSV RNA NPH QL NAA+NON-PROBE: NOT DETECTED
RV+EV RNA NPH QL NAA+NON-PROBE: NOT DETECTED
SARS-COV-2 RNA NPH QL NAA+NON-PROBE: NOT DETECTED

## 2023-04-07 PROCEDURE — 0202U NFCT DS 22 TRGT SARS-COV-2: CPT

## 2023-04-07 PROCEDURE — 71046 X-RAY EXAM CHEST 2 VIEWS: CPT

## 2023-04-07 PROCEDURE — 99284 EMERGENCY DEPT VISIT MOD MDM: CPT

## 2023-04-07 RX ORDER — FLUTICASONE PROPIONATE 50 MCG
2 SPRAY, SUSPENSION (ML) NASAL DAILY
Qty: 16 G | Refills: 0 | Status: SHIPPED | OUTPATIENT
Start: 2023-04-07

## 2023-04-07 RX ORDER — AMOXICILLIN 500 MG/1
500 CAPSULE ORAL 2 TIMES DAILY
Qty: 14 CAPSULE | Refills: 0 | Status: SHIPPED | OUTPATIENT
Start: 2023-04-07 | End: 2023-04-14

## 2023-04-07 RX ORDER — AMOXICILLIN 500 MG/1
500 CAPSULE ORAL 2 TIMES DAILY
Qty: 14 CAPSULE | Refills: 0 | Status: SHIPPED | OUTPATIENT
Start: 2023-04-07 | End: 2023-04-07 | Stop reason: SDUPTHER

## 2023-04-07 RX ORDER — FLUTICASONE PROPIONATE 50 MCG
2 SPRAY, SUSPENSION (ML) NASAL DAILY
Qty: 16 G | Refills: 0 | Status: SHIPPED | OUTPATIENT
Start: 2023-04-07 | End: 2023-04-07 | Stop reason: SDUPTHER

## 2023-04-07 ASSESSMENT — PAIN DESCRIPTION - LOCATION: LOCATION: THROAT

## 2023-04-07 ASSESSMENT — LIFESTYLE VARIABLES
HOW MANY STANDARD DRINKS CONTAINING ALCOHOL DO YOU HAVE ON A TYPICAL DAY: PATIENT DOES NOT DRINK
HOW OFTEN DO YOU HAVE A DRINK CONTAINING ALCOHOL: NEVER

## 2023-04-07 ASSESSMENT — PAIN - FUNCTIONAL ASSESSMENT
PAIN_FUNCTIONAL_ASSESSMENT: PREVENTS OR INTERFERES WITH MANY ACTIVE NOT PASSIVE ACTIVITIES
PAIN_FUNCTIONAL_ASSESSMENT: 0-10

## 2023-04-07 ASSESSMENT — PAIN DESCRIPTION - DESCRIPTORS: DESCRIPTORS: SHARP

## 2023-04-07 ASSESSMENT — PAIN DESCRIPTION - FREQUENCY: FREQUENCY: CONTINUOUS

## 2023-04-07 ASSESSMENT — PAIN SCALES - GENERAL: PAINLEVEL_OUTOF10: 10

## 2023-04-07 NOTE — ED TRIAGE NOTES
Arrived ambulatory with  to room 5 for triage. Tolerated without difficulty. Bed in lowest position. Call light given.  Gowned for exam.

## 2023-04-07 NOTE — ED PROVIDER NOTES
education level: Not on file   Occupational History    Not on file   Tobacco Use    Smoking status: Never    Smokeless tobacco: Never   Vaping Use    Vaping Use: Some days    Substances: THC   Substance and Sexual Activity    Alcohol use: No    Drug use: Yes     Types: Marijuana Ramiro Hernandez)     Comment: Medical marijuana    Sexual activity: Yes     Partners: Male   Other Topics Concern    Not on file   Social History Narrative    Not on file     Social Determinants of Health     Financial Resource Strain: Not on file   Food Insecurity: Not on file   Transportation Needs: Not on file   Physical Activity: Not on file   Stress: Not on file   Social Connections: Not on file   Intimate Partner Violence: Not on file   Housing Stability: Not on file     No current facility-administered medications for this encounter. Current Outpatient Medications   Medication Sig Dispense Refill    medical marijuana Take by mouth as needed.       amoxicillin (AMOXIL) 500 MG capsule Take 1 capsule by mouth 2 times daily for 7 days 14 capsule 0    fluticasone (FLONASE) 50 MCG/ACT nasal spray 2 sprays by Each Nostril route daily 16 g 0    QUEtiapine (SEROQUEL) 25 MG tablet Take 1 tablet by mouth at bedtime      ibuprofen (ADVIL;MOTRIN) 800 MG tablet Take 1 tablet by mouth every 8 hours as needed for Pain (Patient not taking: Reported on 4/7/2023) 24 tablet 0    SUMAtriptan (IMITREX) 100 MG tablet Take 1 tablet by mouth once as needed for Migraine (Patient not taking: Reported on 4/7/2023) 9 tablet 0    pantoprazole (PROTONIX) 40 MG tablet Take 1 tablet by mouth daily      busPIRone (BUSPAR) 10 MG tablet Take 2 tablets by mouth in the morning and at bedtime      acetaminophen (AMINOFEN) 325 MG tablet Take 2 tablets by mouth every 6 hours as needed for Pain 120 tablet 0    escitalopram (LEXAPRO) 20 MG tablet Take 1 tablet by mouth daily       Allergies   Allergen Reactions    Cyclobenzaprine      anger    Prednisone      Rapid heart rate

## 2023-05-02 ENCOUNTER — OFFICE VISIT (OUTPATIENT)
Dept: BARIATRICS/WEIGHT MGMT | Age: 48
End: 2023-05-02
Payer: COMMERCIAL

## 2023-05-02 VITALS
HEIGHT: 60 IN | SYSTOLIC BLOOD PRESSURE: 126 MMHG | BODY MASS INDEX: 50.3 KG/M2 | WEIGHT: 256.2 LBS | OXYGEN SATURATION: 99 % | HEART RATE: 98 BPM | DIASTOLIC BLOOD PRESSURE: 82 MMHG

## 2023-05-02 DIAGNOSIS — E66.01 MORBID OBESITY (HCC): ICD-10-CM

## 2023-05-02 DIAGNOSIS — Z79.899 MEDICATION MANAGEMENT: Primary | ICD-10-CM

## 2023-05-02 DIAGNOSIS — Z01.818 PRE-OPERATIVE CLEARANCE: ICD-10-CM

## 2023-05-02 PROCEDURE — 1036F TOBACCO NON-USER: CPT | Performed by: SURGERY

## 2023-05-02 PROCEDURE — 99215 OFFICE O/P EST HI 40 MIN: CPT | Performed by: SURGERY

## 2023-05-02 PROCEDURE — G8427 DOCREV CUR MEDS BY ELIG CLIN: HCPCS | Performed by: SURGERY

## 2023-05-02 PROCEDURE — G8417 CALC BMI ABV UP PARAM F/U: HCPCS | Performed by: SURGERY

## 2023-05-02 ASSESSMENT — PATIENT HEALTH QUESTIONNAIRE - PHQ9
SUM OF ALL RESPONSES TO PHQ QUESTIONS 1-9: 0
SUM OF ALL RESPONSES TO PHQ QUESTIONS 1-9: 0
SUM OF ALL RESPONSES TO PHQ9 QUESTIONS 1 & 2: 0
SUM OF ALL RESPONSES TO PHQ QUESTIONS 1-9: 0
SUM OF ALL RESPONSES TO PHQ QUESTIONS 1-9: 0
1. LITTLE INTEREST OR PLEASURE IN DOING THINGS: 0
2. FEELING DOWN, DEPRESSED OR HOPELESS: 0

## 2023-05-02 ASSESSMENT — ENCOUNTER SYMPTOMS
RESPIRATORY NEGATIVE: 1
GASTROINTESTINAL NEGATIVE: 1
EYES NEGATIVE: 1

## 2023-05-02 NOTE — PROGRESS NOTES
hospital course. Expectations were discussed with the patient and the patient agreed to proceed. The patient previously attended an informational seminar which discussed surgical and non-surgical options as well as procedure specific risks, benefits, and alternatives. 2. Will order initial bariatric surgery labs unless the patient has had any in the previous six months. 3. The patient will be scheduled to be seen by our weight management CNP and registered dietician after having the above labs drawn and resulted. 4. The patient will be referred for an evaluation by physical therapy for assistance with an exercise plan. 5. The patient will be given a bariatric handbook by the dietician at the initial nutrition class. The patient will be instructed to review the handbook and to ask questions about any part which is not clear at any time throughout the workup process. It was stressed to the patient the need to thoroughly review the handbook and the patient agreed. 6. Discussed with patient that losing weight prior to surgery can be indicator of post-operative success. Goal set at this visit for patient for weight loss prior to surgery is 12-25 lbs. 7. Discussed with patient need for long-term follow-up and vitamin supplementation. The patient is agreeable. 8. This patient is a female of reproductive age and was advised she should not become pregnant during the bariatric workup process and for at least 12-18 months after surgery. The patient is agreeable to this plan. Pt previously had hysterectomy. Thank you for this referral / consult. Please call with any questions or concerns you have. Patient was seen with total face-to-face time and time spent reviewing chart, placing orders, and reviewing past/current results of labs and images of over 45 minutes.  More than 50% of this visit was counseling on diet, nutrition, surgical options, and education as above documented in my

## 2023-08-02 ENCOUNTER — HOSPITAL ENCOUNTER (EMERGENCY)
Age: 48
Discharge: HOME | End: 2023-08-02
Payer: COMMERCIAL

## 2023-08-02 VITALS
WEIGHT: 249 LBS | RESPIRATION RATE: 16 BRPM | HEART RATE: 78 BPM | OXYGEN SATURATION: 98 % | SYSTOLIC BLOOD PRESSURE: 151 MMHG | BODY MASS INDEX: 47.01 KG/M2 | HEIGHT: 61 IN | DIASTOLIC BLOOD PRESSURE: 106 MMHG | TEMPERATURE: 97.9 F

## 2023-08-02 DIAGNOSIS — S29.019A THORACIC MYOFASCIAL STRAIN, INITIAL ENCOUNTER: ICD-10-CM

## 2023-08-02 DIAGNOSIS — M54.31 SCIATICA OF RIGHT SIDE: ICD-10-CM

## 2023-08-02 DIAGNOSIS — G89.29 ACUTE EXACERBATION OF CHRONIC LOW BACK PAIN: Primary | ICD-10-CM

## 2023-08-02 DIAGNOSIS — M54.50 ACUTE EXACERBATION OF CHRONIC LOW BACK PAIN: Primary | ICD-10-CM

## 2023-08-02 LAB
BILIRUBIN URINE: NEGATIVE MG/DL
BLOOD, URINE: NEGATIVE
CLARITY: CLEAR
COLOR: YELLOW
COMMENT UA: NORMAL
GLUCOSE, URINE: NEGATIVE MG/DL
INTERPRETATION: NORMAL
KETONES, URINE: NEGATIVE MG/DL
LEUKOCYTE ESTERASE, URINE: NEGATIVE
NITRITE URINE, QUANTITATIVE: NEGATIVE
PH, URINE: 7.5 (ref 5–8)
PREGNANCY, URINE: NEGATIVE
PROTEIN UA: NEGATIVE MG/DL
SPECIFIC GRAVITY UA: 1.01 (ref 1–1.03)
UROBILINOGEN, URINE: 0.2 MG/DL (ref 0.2–1)

## 2023-08-02 PROCEDURE — 6360000002 HC RX W HCPCS: Performed by: NURSE PRACTITIONER

## 2023-08-02 PROCEDURE — 81003 URINALYSIS AUTO W/O SCOPE: CPT

## 2023-08-02 PROCEDURE — 96372 THER/PROPH/DIAG INJ SC/IM: CPT

## 2023-08-02 PROCEDURE — 99284 EMERGENCY DEPT VISIT MOD MDM: CPT

## 2023-08-02 PROCEDURE — 6370000000 HC RX 637 (ALT 250 FOR IP): Performed by: NURSE PRACTITIONER

## 2023-08-02 PROCEDURE — 81025 URINE PREGNANCY TEST: CPT

## 2023-08-02 RX ORDER — KETOROLAC TROMETHAMINE 15 MG/ML
30 INJECTION, SOLUTION INTRAMUSCULAR; INTRAVENOUS ONCE
Status: DISCONTINUED | OUTPATIENT
Start: 2023-08-02 | End: 2023-08-02

## 2023-08-02 RX ORDER — DEXAMETHASONE SODIUM PHOSPHATE 10 MG/ML
6 INJECTION, SOLUTION INTRAMUSCULAR; INTRAVENOUS ONCE
Status: COMPLETED | OUTPATIENT
Start: 2023-08-02 | End: 2023-08-02

## 2023-08-02 RX ORDER — METHOCARBAMOL 750 MG/1
750 TABLET, FILM COATED ORAL 4 TIMES DAILY
Qty: 20 TABLET | Refills: 0 | Status: SHIPPED | OUTPATIENT
Start: 2023-08-02 | End: 2023-08-07

## 2023-08-02 RX ORDER — IBUPROFEN 600 MG/1
600 TABLET ORAL EVERY 8 HOURS PRN
Qty: 30 TABLET | Refills: 0 | Status: SHIPPED | OUTPATIENT
Start: 2023-08-02

## 2023-08-02 RX ORDER — KETOROLAC TROMETHAMINE 15 MG/ML
30 INJECTION, SOLUTION INTRAMUSCULAR; INTRAVENOUS ONCE
Status: COMPLETED | OUTPATIENT
Start: 2023-08-02 | End: 2023-08-02

## 2023-08-02 RX ORDER — LIDOCAINE 4 G/G
1 PATCH TOPICAL DAILY
Qty: 10 EACH | Refills: 0 | Status: SHIPPED | OUTPATIENT
Start: 2023-08-02 | End: 2023-08-12

## 2023-08-02 RX ORDER — METHOCARBAMOL 500 MG/1
750 TABLET, FILM COATED ORAL 4 TIMES DAILY
Status: DISCONTINUED | OUTPATIENT
Start: 2023-08-02 | End: 2023-08-02 | Stop reason: HOSPADM

## 2023-08-02 RX ADMIN — METHOCARBAMOL 750 MG: 500 TABLET ORAL at 19:46

## 2023-08-02 RX ADMIN — KETOROLAC TROMETHAMINE 30 MG: 15 INJECTION, SOLUTION INTRAMUSCULAR; INTRAVENOUS at 19:46

## 2023-08-02 RX ADMIN — DEXAMETHASONE SODIUM PHOSPHATE 6 MG: 10 INJECTION, SOLUTION INTRAMUSCULAR; INTRAVENOUS at 19:47

## 2023-08-02 ASSESSMENT — PAIN SCALES - GENERAL
PAINLEVEL_OUTOF10: 8
PAINLEVEL_OUTOF10: 8

## 2023-08-02 ASSESSMENT — PAIN - FUNCTIONAL ASSESSMENT: PAIN_FUNCTIONAL_ASSESSMENT: 0-10

## 2023-08-02 ASSESSMENT — PAIN DESCRIPTION - PAIN TYPE: TYPE: ACUTE PAIN

## 2023-08-02 ASSESSMENT — PAIN DESCRIPTION - ORIENTATION
ORIENTATION: LOWER;MID
ORIENTATION: MID

## 2023-08-02 ASSESSMENT — PAIN DESCRIPTION - LOCATION
LOCATION: BACK
LOCATION: BACK

## 2023-08-02 NOTE — ED TRIAGE NOTES
Patient complaining of middle back pain that goes down her back and then down her right leg. Denies recent falls. Has been taking tylenol and aleve at home.

## 2023-08-02 NOTE — ED PROVIDER NOTES
[facility] ENCOUNTER      Pt Name: Nicci Mendoza  MRN: [id number]  [address] [facility], [place], operated by [facility] 1975  Date of evaluation: 8/2/2023  Provider: JOSÉ Quinn - BENJAMIN  PCP: KAREN Gonzalez  Note Started: 6:58 PM EDT 8/2/23    CHIEF COMPLAINT       Chief Complaint   Patient presents with    Back Pain     Back pain radiating into rt leg       HISTORY OF PRESENT ILLNESS: 1 or more Elements   Nicci Mendoza is a 57 y.o. female who presents to the emergency department with back pain located mid back and radiates into the right leg to the foot. She has chronic back pain but worsened yesterday   No known inj. The pain is desscribed as a carushing   It is rated 8/10 Modifying factors include moving. Alleviating factors include ice  Patient denies any back pain red flags. I have reviewed the nursing triage documentation and agree unless otherwise noted. REVIEW OF SYSTEMS :    Review of Systems    Constitutional:  Denies fever or chills  Eyes:  Denies change in visual acuity  HENT:  Denies nasal congestion or sore throat  Respiratory:  Denies cough or shortness of breath  Cardiovascular:  Denies chest pain or edema  GI:  Denies abdominal pain, nausea, vomiting, bloody stools or diarrhea  :  Denies dysuria  Musculoskeletal:  Complains of back pain  Integument:  Denies rash  Neurologic:  Denies headache, focal weakness or sensory changes    I have reviewed the nursing triage documentation and agree unless otherwise noted   Positives and Pertinent negatives as per HPI.      SURGICAL HISTORY     Past Surgical History:   Procedure Laterality Date    BREAST REDUCTION SURGERY Bilateral 2007    HYSTERECTOMY (CERVIX STATUS UNKNOWN)  2007    KNEE ARTHROSCOPY Right 5/24/2022    RIGHT KNEE ARTHROSCOPY WITH CHONDROPLASTY performed by Kahlil Carpenter DO at [address] [address]      As a child up

## 2023-08-02 NOTE — ED NOTES
Attempted to called Althea Ruiz NP for medication route clarification. No answer. To clarify prior to administering.       Rianna Callahan RN  08/02/23 0674

## 2023-08-03 NOTE — DISCHARGE INSTRUCTIONS
It was a pleasure to see you today. Please take medications as prescribed. Follow-up with your primary care provider. Return to emergency department for any worsening signs or symptoms.

## 2023-08-23 ENCOUNTER — HOSPITAL ENCOUNTER (OUTPATIENT)
Age: 48
Discharge: HOME | End: 2023-08-23
Payer: COMMERCIAL

## 2023-08-23 ENCOUNTER — HOSPITAL ENCOUNTER (OUTPATIENT)
Dept: GENERAL RADIOLOGY | Age: 48
Discharge: HOME | End: 2023-08-23
Payer: COMMERCIAL

## 2023-08-23 DIAGNOSIS — M25.361 PATELLAR INSTABILITY OF RIGHT KNEE: Primary | ICD-10-CM

## 2023-08-23 DIAGNOSIS — M25.361 PATELLAR INSTABILITY OF RIGHT KNEE: ICD-10-CM

## 2023-08-23 PROCEDURE — 73562 X-RAY EXAM OF KNEE 3: CPT

## 2023-08-30 ENCOUNTER — OFFICE VISIT (OUTPATIENT)
Dept: ORTHOPEDIC SURGERY | Age: 48
End: 2023-08-30

## 2023-08-30 ENCOUNTER — TELEPHONE (OUTPATIENT)
Dept: ORTHOPEDIC SURGERY | Age: 48
End: 2023-08-30

## 2023-08-30 VITALS
HEART RATE: 83 BPM | DIASTOLIC BLOOD PRESSURE: 97 MMHG | OXYGEN SATURATION: 94 % | BODY MASS INDEX: 47.05 KG/M2 | SYSTOLIC BLOOD PRESSURE: 129 MMHG | HEIGHT: 61 IN

## 2023-08-30 DIAGNOSIS — M17.11 PRIMARY OSTEOARTHRITIS OF RIGHT KNEE: Primary | ICD-10-CM

## 2023-08-30 RX ORDER — ZOLPIDEM TARTRATE 5 MG/1
5 TABLET ORAL NIGHTLY PRN
COMMUNITY

## 2023-08-30 RX ORDER — TRIAMCINOLONE ACETONIDE 40 MG/ML
40 INJECTION, SUSPENSION INTRA-ARTICULAR; INTRAMUSCULAR ONCE
Status: SHIPPED | OUTPATIENT
Start: 2023-08-30

## 2023-08-30 ASSESSMENT — ENCOUNTER SYMPTOMS
ABDOMINAL PAIN: 0
EYE REDNESS: 0
COLOR CHANGE: 0
CHEST TIGHTNESS: 0
BACK PAIN: 0

## 2023-08-30 NOTE — PATIENT INSTRUCTIONS
Continue weight-bearing as tolerated. Continue range of motion exercises as instructed. Ice and elevate as needed. Tylenol or Motrin for pain. Steroid injection given today. We will get Orthovisc injections approved through your insurance, we will call you once approval is received. We are committed to providing you the best care possible. If you receive a survey after visiting one of our offices, please take time to share your experience concerning your physician office visit. These surveys are confidential and no health information about you is shared. We are eager to improve for you and we are counting on your feedback to help make that happen.

## 2023-08-30 NOTE — PROGRESS NOTES
Subjective:      Patient ID: Lorraine Colon is a 57 y.o. female. Patient seen in office today for RT knee new injury. X-rays taken back on 8/23/23. Had RT knee arthroscopy back on 5/24/22. Stated she has been having increased pain and aching since. Having falls and stated she's been staying in bed since. 7/10 pain level today. Reported increased swelling. Stated her pain is primarily in posterior knee joint. IMPRESSION:  1. No acute bony abnormality  2. 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 Joint effusion    She comes in today for recheck of her right knee pain. She states that since her right knee arthroscopy last year she has continued having slowly progressively worsening deep, aching and grinding pain along the anterior and medial aspect of her right knee. She states that now the pain has worsened over the past couple of months. Patient denies any new injury to the involved extremity/ joint, denies numbness or tingling in the involved extremity and denies fever or chills. Review of Systems   Constitutional:  Negative for activity change, chills and fever. HENT:  Negative for congestion and drooling. Eyes:  Negative for redness. Respiratory:  Negative for chest tightness. Cardiovascular:  Negative for chest pain. Gastrointestinal:  Negative for abdominal pain. Endocrine: Negative for cold intolerance and heat intolerance. Musculoskeletal:  Positive for arthralgias, gait problem, joint swelling and myalgias. Negative for back pain. Skin:  Negative for color change, pallor, rash and wound. Neurological:  Negative for weakness and numbness. Psychiatric/Behavioral:  Negative for confusion.       Past Medical History:   Diagnosis Date    Anxiety     Depression     GERD (gastroesophageal reflux disease)     Headache     migraines    Hyperlipidemia     Palpitation     PONV (postoperative nausea and vomiting)     Thyroid disease     Vitamin D deficiency

## 2023-08-30 NOTE — PROGRESS NOTES
Patient seen in office today for RT knee new injury. X-rays taken back on 8/23/23. Had RT knee arthroscopy back on 5/24/22. Stated she has been having increased pain and aching since. Having falls and stated she's been staying in bed since. 7/10 pain level today. Reported increased swelling. Stated her pain is primarily in posterior knee joint. IMPRESSION:  1. No acute bony abnormality  2. 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  Joint effusion

## 2023-09-07 NOTE — TELEPHONE ENCOUNTER
Precert for [employer] 8/31/23. Clinical questions were answered via phone 9/6/23. Request is still pending review.

## 2023-09-11 NOTE — TELEPHONE ENCOUNTER
Spoke with patient and she stated that her  is having surgery, and she will be in [place] for an undetermined amount of time. She will call as soon as she is ready to schedule.

## 2023-09-26 ENCOUNTER — HOSPITAL ENCOUNTER (EMERGENCY)
Age: 48
Discharge: HOME | End: 2023-09-26
Attending: EMERGENCY MEDICINE
Payer: COMMERCIAL

## 2023-09-26 VITALS
TEMPERATURE: 98 F | BODY MASS INDEX: 46.44 KG/M2 | SYSTOLIC BLOOD PRESSURE: 121 MMHG | WEIGHT: 246 LBS | RESPIRATION RATE: 13 BRPM | OXYGEN SATURATION: 98 % | HEIGHT: 61 IN | DIASTOLIC BLOOD PRESSURE: 78 MMHG | HEART RATE: 77 BPM

## 2023-09-26 DIAGNOSIS — R07.89 NON-CARDIAC CHEST PAIN: ICD-10-CM

## 2023-09-26 DIAGNOSIS — I10 HYPERTENSION, UNSPECIFIED TYPE: Primary | ICD-10-CM

## 2023-09-26 DIAGNOSIS — G43.809 OTHER MIGRAINE WITHOUT STATUS MIGRAINOSUS, NOT INTRACTABLE: ICD-10-CM

## 2023-09-26 LAB
ALBUMIN SERPL-MCNC: 4.2 GM/DL (ref 3.4–5)
ALP BLD-CCNC: 73 IU/L (ref 40–129)
ALT SERPL-CCNC: 16 U/L (ref 10–40)
ANION GAP SERPL CALCULATED.3IONS-SCNC: 8 MMOL/L (ref 4–16)
AST SERPL-CCNC: 18 IU/L (ref 15–37)
BASOPHILS ABSOLUTE: 0.1 K/CU MM
BASOPHILS RELATIVE PERCENT: 0.7 % (ref 0–1)
BILIRUB SERPL-MCNC: 0.6 MG/DL (ref 0–1)
BUN SERPL-MCNC: 13 MG/DL (ref 6–23)
CALCIUM SERPL-MCNC: 9.3 MG/DL (ref 8.3–10.6)
CHLORIDE BLD-SCNC: 103 MMOL/L (ref 99–110)
CO2: 30 MMOL/L (ref 21–32)
CREAT SERPL-MCNC: 0.7 MG/DL (ref 0.6–1.1)
DIFFERENTIAL TYPE: ABNORMAL
EKG ATRIAL RATE: 91 BPM
EKG DIAGNOSIS: NORMAL
EKG P AXIS: 51 DEGREES
EKG P-R INTERVAL: 134 MS
EKG Q-T INTERVAL: 380 MS
EKG QRS DURATION: 80 MS
EKG QTC CALCULATION (BAZETT): 467 MS
EKG R AXIS: 14 DEGREES
EKG T AXIS: 36 DEGREES
EKG VENTRICULAR RATE: 91 BPM
EOSINOPHILS ABSOLUTE: 0.3 K/CU MM
EOSINOPHILS RELATIVE PERCENT: 3.6 % (ref 0–3)
GFR SERPL CREATININE-BSD FRML MDRD: >60 ML/MIN/1.73M2
GLUCOSE SERPL-MCNC: 91 MG/DL (ref 70–99)
HCT VFR BLD CALC: 42.6 % (ref 37–47)
HEMOGLOBIN: 13.2 GM/DL (ref 12.5–16)
IMMATURE NEUTROPHIL %: 0.7 % (ref 0–0.43)
LYMPHOCYTES ABSOLUTE: 1.8 K/CU MM
LYMPHOCYTES RELATIVE PERCENT: 21.3 % (ref 24–44)
MCH RBC QN AUTO: 28.8 PG (ref 27–31)
MCHC RBC AUTO-ENTMCNC: 31 % (ref 32–36)
MCV RBC AUTO: 93 FL (ref 78–100)
MONOCYTES ABSOLUTE: 0.5 K/CU MM
MONOCYTES RELATIVE PERCENT: 5.6 % (ref 0–4)
PDW BLD-RTO: 14.2 % (ref 11.7–14.9)
PLATELET # BLD: 306 K/CU MM (ref 140–440)
PMV BLD AUTO: 10.2 FL (ref 7.5–11.1)
POTASSIUM SERPL-SCNC: 3.7 MMOL/L (ref 3.5–5.1)
RBC # BLD: 4.58 M/CU MM (ref 4.2–5.4)
SEGMENTED NEUTROPHILS ABSOLUTE COUNT: 5.6 K/CU MM
SEGMENTED NEUTROPHILS RELATIVE PERCENT: 68.1 % (ref 36–66)
SODIUM BLD-SCNC: 141 MMOL/L (ref 135–145)
TOTAL IMMATURE NEUTOROPHIL: 0.06 K/CU MM
TOTAL PROTEIN: 6.9 GM/DL (ref 6.4–8.2)
TROPONIN T: <0.01 NG/ML
WBC # BLD: 8.3 K/CU MM (ref 4–10.5)

## 2023-09-26 PROCEDURE — 80053 COMPREHEN METABOLIC PANEL: CPT

## 2023-09-26 PROCEDURE — 85025 COMPLETE CBC W/AUTO DIFF WBC: CPT

## 2023-09-26 PROCEDURE — 99284 EMERGENCY DEPT VISIT MOD MDM: CPT

## 2023-09-26 PROCEDURE — 93005 ELECTROCARDIOGRAM TRACING: CPT | Performed by: EMERGENCY MEDICINE

## 2023-09-26 PROCEDURE — 93010 ELECTROCARDIOGRAM REPORT: CPT | Performed by: INTERNAL MEDICINE

## 2023-09-26 PROCEDURE — 84484 ASSAY OF TROPONIN QUANT: CPT

## 2023-09-26 ASSESSMENT — PAIN DESCRIPTION - LOCATION: LOCATION: HEAD

## 2023-09-26 ASSESSMENT — PAIN DESCRIPTION - DESCRIPTORS: DESCRIPTORS: ACHING

## 2023-09-26 ASSESSMENT — ENCOUNTER SYMPTOMS: RESPIRATORY NEGATIVE: 1

## 2023-09-26 ASSESSMENT — PAIN - FUNCTIONAL ASSESSMENT: PAIN_FUNCTIONAL_ASSESSMENT: 0-10

## 2023-09-26 ASSESSMENT — PAIN SCALES - GENERAL: PAINLEVEL_OUTOF10: 7

## 2023-09-26 NOTE — ED PROVIDER NOTES
14.9 %    Platelets [phone] - 912 K/CU MM    MPV 10.2 7.5 - 11.1 FL    Differential Type AUTOMATED DIFFERENTIAL     Segs Relative 68.1 (H) 36 - 66 %    Lymphocytes % 21.3 (L) 24 - 44 %    Monocytes % 5.6 (H) 0 - 4 %    Eosinophils % 3.6 (H) 0 - 3 %    Basophils % 0.7 0 - 1 %    Segs Absolute 5.6 K/CU MM    Lymphocytes Absolute 1.8 K/CU MM    Monocytes Absolute 0.5 K/CU MM    Eosinophils Absolute 0.3 K/CU MM    Basophils Absolute 0.1 K/CU MM    Immature Neutrophil % 0.7 (H) 0 - 0.43 %    Total Immature Neutrophil 0.06 K/CU MM   CMP   Result Value Ref Range    Sodium 141 135 - 145 MMOL/L    Potassium 3.7 3.5 - 5.1 MMOL/L    Chloride 103 99 - 110 mMol/L    CO2 30 21 - 32 MMOL/L    Anion Gap 8 4 - 16    Glucose 91 70 - 99 MG/DL    BUN 13 6 - 23 MG/DL    Creatinine 0.7 0.6 - 1.1 MG/DL    Est, Glom Filt Rate >60 >60 mL/min/1.73m2    Calcium 9.3 8.3 - 10.6 MG/DL    Total Protein 6.9 6.4 - 8.2 GM/DL    Albumin 4.2 3.4 - 5.0 GM/DL    Total Bilirubin 0.6 0.0 - 1.0 MG/DL    Alkaline Phosphatase 73 40 - 129 IU/L    ALT 16 10 - 40 U/L    AST 18 15 - 37 IU/L   Troponin   Result Value Ref Range    Troponin T <0.010 <0.01 NG/ML   EKG 12 Lead   Result Value Ref Range    Ventricular Rate 91 BPM    Atrial Rate 91 BPM    P-R Interval 134 ms    QRS Duration 80 ms    Q-T Interval 380 ms    QTc Calculation (Bazett) 467 ms    P Axis 51 degrees    R Axis 14 degrees    T Axis 36 degrees    Diagnosis       Normal sinus rhythm  Cannot rule out Anterior infarct (cited on or before 30-APR-2022)  Abnormal ECG  When compared with ECG of 22-JUL-2022 15:35,  No significant change was found        Radiographs (if obtained):  [] The following radiograph wasinterpreted by myself in the absence of a radiologist:   [] Radiologist's Report Reviewed:  No orders to display         EKG (if obtained): (All EKG's are interpreted by myself in the absence of a cardiologist)      The 12 lead EKG was interpreted by me, and the interpretation is as follows:  normal

## 2023-09-26 NOTE — ED NOTES
Patient discharging home, AVS reviewed with no questions at this time. Patient instrcuted to follow up per discharge instructions and to return for worsening symptoms. Respirations equal and unlabored, skin PWD.        Viral Benedict RN  09/26/23 [id number]

## 2023-09-28 ENCOUNTER — OFFICE VISIT (OUTPATIENT)
Dept: ORTHOPEDIC SURGERY | Age: 48
End: 2023-09-28

## 2023-09-28 ENCOUNTER — OFFICE VISIT (OUTPATIENT)
Dept: CARDIOLOGY CLINIC | Age: 48
End: 2023-09-28
Payer: COMMERCIAL

## 2023-09-28 VITALS
OXYGEN SATURATION: 98 % | HEART RATE: 94 BPM | HEIGHT: 61 IN | BODY MASS INDEX: 45.88 KG/M2 | WEIGHT: 243 LBS | RESPIRATION RATE: 15 BRPM

## 2023-09-28 VITALS
BODY MASS INDEX: 45.99 KG/M2 | HEIGHT: 61 IN | SYSTOLIC BLOOD PRESSURE: 136 MMHG | HEART RATE: 93 BPM | DIASTOLIC BLOOD PRESSURE: 84 MMHG | WEIGHT: 243.6 LBS

## 2023-09-28 DIAGNOSIS — G47.33 OBSTRUCTIVE SLEEP APNEA: ICD-10-CM

## 2023-09-28 DIAGNOSIS — M17.11 PRIMARY OSTEOARTHRITIS OF RIGHT KNEE: Primary | ICD-10-CM

## 2023-09-28 DIAGNOSIS — E66.01 CLASS 3 SEVERE OBESITY DUE TO EXCESS CALORIES WITH SERIOUS COMORBIDITY AND BODY MASS INDEX (BMI) OF 45.0 TO 49.9 IN ADULT (HCC): ICD-10-CM

## 2023-09-28 DIAGNOSIS — E78.5 DYSLIPIDEMIA: ICD-10-CM

## 2023-09-28 DIAGNOSIS — R06.02 SHORTNESS OF BREATH: ICD-10-CM

## 2023-09-28 DIAGNOSIS — I10 ESSENTIAL HYPERTENSION: ICD-10-CM

## 2023-09-28 DIAGNOSIS — R07.9 CHEST PAIN, UNSPECIFIED TYPE: Primary | ICD-10-CM

## 2023-09-28 PROCEDURE — 99204 OFFICE O/P NEW MOD 45 MIN: CPT | Performed by: INTERNAL MEDICINE

## 2023-09-28 PROCEDURE — 3079F DIAST BP 80-89 MM HG: CPT | Performed by: INTERNAL MEDICINE

## 2023-09-28 PROCEDURE — G8417 CALC BMI ABV UP PARAM F/U: HCPCS | Performed by: INTERNAL MEDICINE

## 2023-09-28 PROCEDURE — 1036F TOBACCO NON-USER: CPT | Performed by: INTERNAL MEDICINE

## 2023-09-28 PROCEDURE — G8427 DOCREV CUR MEDS BY ELIG CLIN: HCPCS | Performed by: INTERNAL MEDICINE

## 2023-09-28 PROCEDURE — 3075F SYST BP GE 130 - 139MM HG: CPT | Performed by: INTERNAL MEDICINE

## 2023-09-28 RX ORDER — NITROGLYCERIN 0.4 MG/1
0.4 TABLET SUBLINGUAL EVERY 5 MIN PRN
Qty: 25 TABLET | Refills: 3 | Status: SHIPPED | OUTPATIENT
Start: 2023-09-28

## 2023-09-28 RX ORDER — MELOXICAM 7.5 MG/1
TABLET ORAL
COMMUNITY
Start: 2023-08-30

## 2023-09-28 RX ORDER — AMLODIPINE BESYLATE 5 MG/1
5 TABLET ORAL DAILY
Qty: 30 TABLET | Refills: 3 | Status: SHIPPED | OUTPATIENT
Start: 2023-09-28

## 2023-09-28 RX ORDER — ASPIRIN 81 MG/1
81 TABLET ORAL DAILY
Qty: 90 TABLET | Refills: 1 | Status: SHIPPED | OUTPATIENT
Start: 2023-09-28

## 2023-09-28 RX ORDER — QUETIAPINE FUMARATE 50 MG/1
TABLET, FILM COATED ORAL
COMMUNITY
Start: 2023-09-11

## 2023-09-28 RX ADMIN — TRIAMCINOLONE ACETONIDE 40 MG: 40 INJECTION, SUSPENSION INTRA-ARTICULAR; INTRAMUSCULAR at 13:54

## 2023-09-28 NOTE — PATIENT INSTRUCTIONS
Continue weight-bearing as tolerated. Continue range of motion exercises as instructed. Ice and elevate as needed. Tylenol or Motrin for pain.    Gel injection given today in the right knee  Follow up in one week

## 2023-09-28 NOTE — PROGRESS NOTES
Irais Ortiz MD                                  CARDIOLOGY  NOTE        Chief Complaint:    Chief Complaint   Patient presents with    Follow-Up from Hospital     Seen in hospital for chest pain and left side of face was numb and palpitations   SOB on exertion  No swelling         HPI:     Katt Lema is a 57y.o. year old female who recently presented to the ER with a chief complaint of chest pain    Patient mentioned that a few years back she was on antihypertensive medications however was discontinued by PCPs office. Recently patient has been experiencing retrosternal chest discomfort 5 out of 10 mostly exertional and associated with shortness of breath occasional palpitations. Patient denies any dependent edema  She complains of fatigue and tiredness through the day, is unsure about snoring at night. She denies any smoking alcohol or drug abuse. Patient has prior history of migraines, questionable TIA with hypertensive urgency in the past.      She mentioned that she has been experiencing intermittent chest discomfort almost on a daily basis which is exacerbated with exertion. Current Outpatient Medications   Medication Sig Dispense Refill    QUEtiapine (SEROQUEL) 50 MG tablet       meloxicam (MOBIC) 7.5 MG tablet       zolpidem (AMBIEN) 5 MG tablet Take 1 tablet by mouth nightly as needed for Sleep.       busPIRone (BUSPAR) 10 MG tablet Take 2 tablets by mouth in the morning and at bedtime      escitalopram (LEXAPRO) 20 MG tablet Take 1 tablet by mouth daily       Current Facility-Administered Medications   Medication Dose Route Frequency Provider Last Rate Last Admin    triamcinolone acetonide (KENALOG-40) injection 40 mg  40 mg IntraMUSCular Once Millie Swain DO           Allergies:     Cyclobenzaprine, Trazodone, and Benadryl [diphenhydramine]    Patient History:    Past Medical History:   Diagnosis Date    Anxiety     Depression     GERD (gastroesophageal reflux disease)     Headache

## 2023-10-04 ENCOUNTER — HOSPITAL ENCOUNTER (OUTPATIENT)
Age: 48
Discharge: HOME | End: 2023-10-04
Payer: COMMERCIAL

## 2023-10-04 LAB
CHOLEST SERPL-MCNC: 299 MG/DL
HDLC SERPL-MCNC: 55 MG/DL
LDLC SERPL CALC-MCNC: 224 MG/DL
TRIGL SERPL-MCNC: 101 MG/DL

## 2023-10-04 PROCEDURE — 36415 COLL VENOUS BLD VENIPUNCTURE: CPT

## 2023-10-04 PROCEDURE — 80061 LIPID PANEL: CPT

## 2023-10-05 ENCOUNTER — OFFICE VISIT (OUTPATIENT)
Dept: ORTHOPEDIC SURGERY | Age: 48
End: 2023-10-05
Payer: COMMERCIAL

## 2023-10-05 VITALS
OXYGEN SATURATION: 97 % | WEIGHT: 243 LBS | HEART RATE: 72 BPM | BODY MASS INDEX: 44.72 KG/M2 | RESPIRATION RATE: 14 BRPM | HEIGHT: 62 IN

## 2023-10-05 DIAGNOSIS — M17.11 PRIMARY OSTEOARTHRITIS OF RIGHT KNEE: Primary | ICD-10-CM

## 2023-10-05 PROCEDURE — 20610 DRAIN/INJ JOINT/BURSA W/O US: CPT | Performed by: PHYSICIAN ASSISTANT

## 2023-10-05 PROCEDURE — 99999 PR OFFICE/OUTPT VISIT,PROCEDURE ONLY: CPT | Performed by: PHYSICIAN ASSISTANT

## 2023-10-05 NOTE — PATIENT INSTRUCTIONS
Hans 3 # 2  Rest both knees for 24-48 hours  Work on ROM and strengthening of knees and legs   May take NSAIDS or anti-inflammatories as needed  Weightbearing as tolerated  Follow up in one week    We are committed to providing you the best care possible. If you receive a survey after visiting one of our offices, please take time to share your experience concerning your physician office visit. These surveys are confidential and no health information about you is shared. We are eager to improve for you and we are counting on your feedback to help make that happen.

## 2023-10-06 ENCOUNTER — OFFICE VISIT (OUTPATIENT)
Dept: CARDIOLOGY CLINIC | Age: 48
End: 2023-10-06
Payer: COMMERCIAL

## 2023-10-06 VITALS
HEIGHT: 61 IN | WEIGHT: 244.2 LBS | HEART RATE: 76 BPM | BODY MASS INDEX: 46.11 KG/M2 | SYSTOLIC BLOOD PRESSURE: 128 MMHG | DIASTOLIC BLOOD PRESSURE: 84 MMHG

## 2023-10-06 DIAGNOSIS — R06.02 SHORTNESS OF BREATH: ICD-10-CM

## 2023-10-06 DIAGNOSIS — R07.9 CHEST PAIN, UNSPECIFIED TYPE: Primary | ICD-10-CM

## 2023-10-06 PROCEDURE — 93000 ELECTROCARDIOGRAM COMPLETE: CPT | Performed by: INTERNAL MEDICINE

## 2023-10-06 PROCEDURE — 99214 OFFICE O/P EST MOD 30 MIN: CPT | Performed by: INTERNAL MEDICINE

## 2023-10-06 PROCEDURE — 1036F TOBACCO NON-USER: CPT | Performed by: INTERNAL MEDICINE

## 2023-10-06 PROCEDURE — G8417 CALC BMI ABV UP PARAM F/U: HCPCS | Performed by: INTERNAL MEDICINE

## 2023-10-06 PROCEDURE — G8427 DOCREV CUR MEDS BY ELIG CLIN: HCPCS | Performed by: INTERNAL MEDICINE

## 2023-10-06 PROCEDURE — G8484 FLU IMMUNIZE NO ADMIN: HCPCS | Performed by: INTERNAL MEDICINE

## 2023-10-06 RX ORDER — ATORVASTATIN CALCIUM 80 MG/1
80 TABLET, FILM COATED ORAL DAILY
Qty: 30 TABLET | Refills: 0 | Status: SHIPPED | OUTPATIENT
Start: 2023-10-06

## 2023-10-06 NOTE — PROGRESS NOTES
Tracey Avelar MD                                  CARDIOLOGY  NOTE        Chief Complaint:    Chief Complaint   Patient presents with    Hyperlipidemia    Hypertension    Follow-up     Complains of chest pain , SOB and palpitations  No dizziness or swelling       Patient presents for an earlier appointment due to ongoing intermittent chest discomfort, and labile blood pressure. Prior history    Rosey Salmon is a 57y.o. year old female who recently presented to the ER with a chief complaint of chest pain    Patient mentioned that a few years back she was on antihypertensive medications however was discontinued by PCPs office. Recently patient has been experiencing retrosternal chest discomfort 5 out of 10 mostly exertional and associated with shortness of breath occasional palpitations. Patient denies any dependent edema  She complains of fatigue and tiredness through the day, is unsure about snoring at night. She denies any smoking alcohol or drug abuse. Patient has prior history of migraines, questionable TIA with hypertensive urgency in the past.      She mentioned that she has been experiencing intermittent chest discomfort almost on a daily basis which is exacerbated with exertion. Current Outpatient Medications   Medication Sig Dispense Refill    atorvastatin (LIPITOR) 80 MG tablet Take 1 tablet by mouth daily 30 tablet 0    QUEtiapine (SEROQUEL) 50 MG tablet       meloxicam (MOBIC) 7.5 MG tablet       aspirin 81 MG EC tablet Take 1 tablet by mouth daily 90 tablet 1    amLODIPine (NORVASC) 5 MG tablet Take 1 tablet by mouth daily (Patient taking differently: Take 1 tablet by mouth daily Taking 1.5 tablets daily) 30 tablet 3    nitroGLYCERIN (NITROSTAT) 0.4 MG SL tablet Place 1 tablet under the tongue every 5 minutes as needed for Chest pain 25 tablet 3    zolpidem (AMBIEN) 5 MG tablet Take 1 tablet by mouth nightly as needed for Sleep.       busPIRone (BUSPAR) 10 MG tablet Take 2 tablets by mouth

## 2023-10-09 ENCOUNTER — TELEPHONE (OUTPATIENT)
Dept: CARDIOLOGY CLINIC | Age: 48
End: 2023-10-09

## 2023-10-09 NOTE — TELEPHONE ENCOUNTER
Noa's NM stress test was denied by her insurance, they are requesting she walk on a treadmill first before they'll approve a NM test.

## 2023-10-12 ENCOUNTER — OFFICE VISIT (OUTPATIENT)
Dept: ORTHOPEDIC SURGERY | Age: 48
End: 2023-10-12

## 2023-10-12 VITALS
HEIGHT: 61 IN | RESPIRATION RATE: 16 BRPM | HEART RATE: 92 BPM | OXYGEN SATURATION: 97 % | BODY MASS INDEX: 46.07 KG/M2 | WEIGHT: 244 LBS

## 2023-10-12 DIAGNOSIS — M17.11 PRIMARY OSTEOARTHRITIS OF RIGHT KNEE: Primary | ICD-10-CM

## 2023-10-12 NOTE — PATIENT INSTRUCTIONS
Follow up as needed  Complete relief may take up to 6 weeks but its also possible that these injections do not work

## 2023-10-19 ENCOUNTER — PROCEDURE VISIT (OUTPATIENT)
Dept: CARDIOLOGY CLINIC | Age: 48
End: 2023-10-19
Payer: COMMERCIAL

## 2023-10-19 DIAGNOSIS — R07.9 CHEST PAIN, UNSPECIFIED TYPE: ICD-10-CM

## 2023-10-19 DIAGNOSIS — R06.02 SHORTNESS OF BREATH: Primary | ICD-10-CM

## 2023-10-19 DIAGNOSIS — E66.01 CLASS 3 SEVERE OBESITY DUE TO EXCESS CALORIES WITH SERIOUS COMORBIDITY AND BODY MASS INDEX (BMI) OF 45.0 TO 49.9 IN ADULT (HCC): ICD-10-CM

## 2023-10-19 DIAGNOSIS — I10 ESSENTIAL HYPERTENSION: ICD-10-CM

## 2023-10-19 DIAGNOSIS — G47.33 OBSTRUCTIVE SLEEP APNEA: ICD-10-CM

## 2023-10-19 DIAGNOSIS — E78.5 DYSLIPIDEMIA: ICD-10-CM

## 2023-10-19 PROCEDURE — 93306 TTE W/DOPPLER COMPLETE: CPT | Performed by: INTERNAL MEDICINE

## 2023-10-20 ENCOUNTER — TELEPHONE (OUTPATIENT)
Dept: CARDIOLOGY CLINIC | Age: 48
End: 2023-10-20

## 2023-10-20 NOTE — TELEPHONE ENCOUNTER
Called to patient the results of her recent echo - Left ventricular systolic function is normal with an ejection fraction of   55-60%. Grade I diastolic dysfunction. Normal left ventricular size and   Mild-to-moderate pulmonic regurgitation present. No evidence of pericardial effusion. Patient verbalized understanding of all information given.

## 2023-10-24 ENCOUNTER — PROCEDURE VISIT (OUTPATIENT)
Dept: CARDIOLOGY CLINIC | Age: 48
End: 2023-10-24
Payer: COMMERCIAL

## 2023-10-24 DIAGNOSIS — R07.9 CHEST PAIN, UNSPECIFIED TYPE: ICD-10-CM

## 2023-10-24 DIAGNOSIS — R07.9 CHEST PAIN, UNSPECIFIED TYPE: Primary | ICD-10-CM

## 2023-10-24 PROCEDURE — 93015 CV STRESS TEST SUPVJ I&R: CPT | Performed by: INTERNAL MEDICINE

## 2023-11-02 ENCOUNTER — OFFICE VISIT (OUTPATIENT)
Dept: CARDIOLOGY CLINIC | Age: 48
End: 2023-11-02
Payer: COMMERCIAL

## 2023-11-02 VITALS
BODY MASS INDEX: 47.24 KG/M2 | SYSTOLIC BLOOD PRESSURE: 118 MMHG | WEIGHT: 250.2 LBS | HEART RATE: 86 BPM | HEIGHT: 61 IN | DIASTOLIC BLOOD PRESSURE: 82 MMHG

## 2023-11-02 DIAGNOSIS — I10 ESSENTIAL HYPERTENSION: ICD-10-CM

## 2023-11-02 DIAGNOSIS — E78.5 DYSLIPIDEMIA: ICD-10-CM

## 2023-11-02 DIAGNOSIS — E66.01 CLASS 3 SEVERE OBESITY DUE TO EXCESS CALORIES WITH SERIOUS COMORBIDITY AND BODY MASS INDEX (BMI) OF 45.0 TO 49.9 IN ADULT (HCC): ICD-10-CM

## 2023-11-02 DIAGNOSIS — R07.9 CHEST PAIN, UNSPECIFIED TYPE: Primary | ICD-10-CM

## 2023-11-02 DIAGNOSIS — R06.02 SHORTNESS OF BREATH: ICD-10-CM

## 2023-11-02 DIAGNOSIS — G47.33 OBSTRUCTIVE SLEEP APNEA: ICD-10-CM

## 2023-11-02 PROCEDURE — G8427 DOCREV CUR MEDS BY ELIG CLIN: HCPCS | Performed by: INTERNAL MEDICINE

## 2023-11-02 PROCEDURE — 1036F TOBACCO NON-USER: CPT | Performed by: INTERNAL MEDICINE

## 2023-11-02 PROCEDURE — G8484 FLU IMMUNIZE NO ADMIN: HCPCS | Performed by: INTERNAL MEDICINE

## 2023-11-02 PROCEDURE — 3079F DIAST BP 80-89 MM HG: CPT | Performed by: INTERNAL MEDICINE

## 2023-11-02 PROCEDURE — 99214 OFFICE O/P EST MOD 30 MIN: CPT | Performed by: INTERNAL MEDICINE

## 2023-11-02 PROCEDURE — 3074F SYST BP LT 130 MM HG: CPT | Performed by: INTERNAL MEDICINE

## 2023-11-02 PROCEDURE — G8417 CALC BMI ABV UP PARAM F/U: HCPCS | Performed by: INTERNAL MEDICINE

## 2023-11-02 RX ORDER — LOSARTAN POTASSIUM 25 MG/1
25 TABLET ORAL DAILY
Qty: 90 TABLET | Refills: 2 | Status: SHIPPED | OUTPATIENT
Start: 2023-11-02 | End: 2024-07-29

## 2023-11-02 RX ORDER — AMLODIPINE BESYLATE 5 MG/1
7.5 TABLET ORAL DAILY
Qty: 135 TABLET | Refills: 2 | Status: SHIPPED | OUTPATIENT
Start: 2023-11-02 | End: 2024-10-27

## 2023-11-02 NOTE — PROGRESS NOTES
was uptitrated. Currently she takes around 7.5 mg p.o. daily 1-1/2 tablet of Norvasc in the morning time. She has noted high blood pressure occasionally at home around 140s to 844D with diastolic blood pressure over 90s usually in the late evenings. Start patient on low-dose losartan 25 mg daily at night continue to monitor blood pressure. Advise not to take Mobic which can precipitate fluid retention/hypertension. Exercise stress test shows low cardiovascular risk with Alonzo score of +6, achieving 7 METS and exercising for 6 minutes. Echocardiogram shows preserved LV ejection fraction, moderate pulmonic valvular regurgitation. Unremarkable study otherwise    No further ischemic work-up planned at this time    Hyperlipidemia:    Total cholesterol 199 HDL 55  triglycerides 101. Started patient on Lipitor 80 mg daily  Recheck lipid panel in 3 months time    Morbid obesity: Patient is following up with weight loss clinic, contemplating weight loss surgery, advised low-salt low-fat diet and exercise as tolerated    Suspected sleep apnea: REORDER sleep study         Return in about 3 months (around 2/2/2024). Counseled extensively and medication compliance urged. We discussed that for the  prevention of ASCVD our  goal is aggressive risk modification. Patient is encouraged to exercise even a brisk walk for 30 minutes  at least 3 to 4 times a week   Various goals were discussed and questions answered. Continue current medications. Appropriate prescriptions are addressed and refills ordered. Questions answered and patient verbalizes understanding. Call for any problems, questions, or concerns.

## 2023-11-06 ENCOUNTER — HOSPITAL ENCOUNTER (OUTPATIENT)
Age: 48
Discharge: HOME | End: 2023-11-06
Payer: COMMERCIAL

## 2023-11-06 ENCOUNTER — HOSPITAL ENCOUNTER (OUTPATIENT)
Dept: GENERAL RADIOLOGY | Age: 48
Discharge: HOME | End: 2023-11-06
Payer: COMMERCIAL

## 2023-11-06 DIAGNOSIS — M54.50 LOW BACK PAIN, UNSPECIFIED BACK PAIN LATERALITY, UNSPECIFIED CHRONICITY, UNSPECIFIED WHETHER SCIATICA PRESENT: ICD-10-CM

## 2023-11-06 PROCEDURE — 72100 X-RAY EXAM L-S SPINE 2/3 VWS: CPT

## 2023-12-27 ENCOUNTER — OFFICE VISIT (OUTPATIENT)
Dept: ORTHOPEDIC SURGERY | Age: 48
End: 2023-12-27
Payer: COMMERCIAL

## 2023-12-27 VITALS
BODY MASS INDEX: 47.27 KG/M2 | HEART RATE: 97 BPM | OXYGEN SATURATION: 94 % | HEIGHT: 61 IN | SYSTOLIC BLOOD PRESSURE: 104 MMHG | DIASTOLIC BLOOD PRESSURE: 86 MMHG

## 2023-12-27 DIAGNOSIS — M17.11 PRIMARY OSTEOARTHRITIS OF RIGHT KNEE: Primary | ICD-10-CM

## 2023-12-27 PROCEDURE — 99213 OFFICE O/P EST LOW 20 MIN: CPT | Performed by: ORTHOPAEDIC SURGERY

## 2023-12-27 PROCEDURE — G8427 DOCREV CUR MEDS BY ELIG CLIN: HCPCS | Performed by: ORTHOPAEDIC SURGERY

## 2023-12-27 PROCEDURE — G8417 CALC BMI ABV UP PARAM F/U: HCPCS | Performed by: ORTHOPAEDIC SURGERY

## 2023-12-27 PROCEDURE — G8484 FLU IMMUNIZE NO ADMIN: HCPCS | Performed by: ORTHOPAEDIC SURGERY

## 2023-12-27 PROCEDURE — 1036F TOBACCO NON-USER: CPT | Performed by: ORTHOPAEDIC SURGERY

## 2023-12-27 NOTE — PATIENT INSTRUCTIONS
Continue weight-bearing as tolerated. Continue range of motion exercises as instructed. Ice and elevate as needed. Tylenol or Motrin for pain. Follow up as needed. We are committed to providing you the best care possible. If you receive a survey after visiting one of our offices, please take time to share your experience concerning your physician office visit. These surveys are confidential and no health information about you is shared. We are eager to improve for you and we are counting on your feedback to help make that happen.
No/Not applicable

## 2023-12-27 NOTE — PROGRESS NOTES
Patient seen in office today for FU RT knee gel injections 10/12/23. 7/10 pain level. Reported gel injections did help for a little bit but has worn off. Stated she thinks the knot on posterior knee joint maybe causing most of the pain. No new injuries. Pt wants to discuss next option.
ecchymosis or lacerations present. 0-140    XRAY  X-ray 3 views of the right knee from August 23, 2023 reviewed by me today in the office demonstrates age appropriate bone density throughout with moderate degenerative changes with approximately 75% medial and patellofemoral joint space narrowing, early osteophyte formation in the medial and patellofemoral compartments with normal tracking of the patella. Assessment:      Right knee OA, moderate      Plan:      I discussed with her today her x-ray findings. I also discussed with her today her response to the gel injections for the right knee. I explained to her that at this point we could either continue with cortisone injections every 3 months, Orthovisc injections every 6 months or could proceed with knee replacement surgery. She would like to consider proceeding with knee replacement surgery but she would like to hold off until after Ovidio's second hip replacement recovery. I had a lengthy discussion with her today in regards to right total knee replacement surgery. I explained risks, benefits, possible complications of the procedure and answered all questions for the patient. I explained postoperative rehabilitation protocol and expectations with the patient today. The patient understands and consents to the procedure. Patient will follow up with their primary care physician prior to surgical treatment for preoperative clearance. We will schedule surgery at soonest convenience. Continue weight-bearing as tolerated. Continue range of motion exercises as instructed. Ice and elevate as needed. Tylenol or Motrin for pain. Follow up in 3 weeks postop. She would like to spend the night in the hospital after surgery and would be a press-fit candidate. She would like to consider scheduling surgery after Melissa Peterson has recovered from his second hip replacement.              [address] [place] [address],

## 2023-12-29 ENCOUNTER — APPOINTMENT (OUTPATIENT)
Dept: GENERAL RADIOLOGY | Age: 48
End: 2023-12-29
Attending: EMERGENCY MEDICINE
Payer: COMMERCIAL

## 2023-12-29 ENCOUNTER — HOSPITAL ENCOUNTER (EMERGENCY)
Age: 48
Discharge: HOME | End: 2023-12-29
Attending: EMERGENCY MEDICINE
Payer: COMMERCIAL

## 2023-12-29 VITALS
SYSTOLIC BLOOD PRESSURE: 115 MMHG | DIASTOLIC BLOOD PRESSURE: 77 MMHG | BODY MASS INDEX: 46.82 KG/M2 | RESPIRATION RATE: 18 BRPM | TEMPERATURE: 99.1 F | HEIGHT: 61 IN | HEART RATE: 86 BPM | OXYGEN SATURATION: 99 % | WEIGHT: 248 LBS

## 2023-12-29 DIAGNOSIS — S63.502A LEFT WRIST SPRAIN, INITIAL ENCOUNTER: Primary | ICD-10-CM

## 2023-12-29 PROCEDURE — 73110 X-RAY EXAM OF WRIST: CPT

## 2023-12-29 PROCEDURE — 73130 X-RAY EXAM OF HAND: CPT

## 2023-12-29 PROCEDURE — 6370000000 HC RX 637 (ALT 250 FOR IP): Performed by: EMERGENCY MEDICINE

## 2023-12-29 PROCEDURE — 99283 EMERGENCY DEPT VISIT LOW MDM: CPT

## 2023-12-29 RX ORDER — IBUPROFEN 400 MG/1
800 TABLET ORAL ONCE
Status: DISCONTINUED | OUTPATIENT
Start: 2023-12-29 | End: 2023-12-29 | Stop reason: HOSPADM

## 2023-12-29 RX ORDER — ACETAMINOPHEN 325 MG/1
650 TABLET ORAL ONCE
Status: COMPLETED | OUTPATIENT
Start: 2023-12-29 | End: 2023-12-29

## 2023-12-29 RX ADMIN — ACETAMINOPHEN 650 MG: 325 TABLET ORAL at 14:43

## 2023-12-29 ASSESSMENT — PAIN DESCRIPTION - LOCATION
LOCATION: WRIST
LOCATION: WRIST

## 2023-12-29 ASSESSMENT — PAIN DESCRIPTION - DESCRIPTORS: DESCRIPTORS: SHARP

## 2023-12-29 ASSESSMENT — PAIN SCALES - GENERAL
PAINLEVEL_OUTOF10: 7
PAINLEVEL_OUTOF10: 7

## 2023-12-29 ASSESSMENT — PAIN DESCRIPTION - ORIENTATION
ORIENTATION: LEFT
ORIENTATION: LEFT

## 2023-12-29 ASSESSMENT — PAIN DESCRIPTION - PAIN TYPE: TYPE: ACUTE PAIN

## 2023-12-29 ASSESSMENT — LIFESTYLE VARIABLES
HOW OFTEN DO YOU HAVE A DRINK CONTAINING ALCOHOL: NEVER
HOW MANY STANDARD DRINKS CONTAINING ALCOHOL DO YOU HAVE ON A TYPICAL DAY: PATIENT DOES NOT DRINK

## 2023-12-29 NOTE — DISCHARGE INSTRUCTIONS
Follow-up with primary care physician in 3 days for reevaluation. Call for an appointment  Follow-up with orthopedist as needed Dr. Sanjeev Vidal. Call for an appointment  Continue wrist brace as needed  Rest ice heat or heat elevation wrist brace to the left upper extremity  Take Tylenol alternate with Motrin for any pain aches and fevers  Return to the emergency department immediately increased pain numbness and tingling weakness in extremities or any worsening symptoms.

## 2023-12-29 NOTE — ED PROVIDER NOTES
Emergency Department Encounter    Patient: Saeed Swann  MRN: [id number]  : 1975  Date of Evaluation: 2023  ED Provider:  Liana Carvajal DO    Triage Chief Complaint:   Wrist Pain (From a fall last night pt has pain with movement of her left hand/thumb/wrist)    [place]:  Saeed Swann is a 57 y.o. female with history of TIA migraines thyroid disease headaches hyperlipidemia palpitations anxiety depression acid reflux that presents to the emergency department complaining of left thumb and wrist pain status post fall. Patient states she fell yesterday and injured her left thumb and wrist.  She states no bleeding cuts laceration. Patient states no previous injuries or surgeries to the left wrist or hand in the past.  Patient states pain 6 out of 10 on the pain scale. She states she has been taking her home meloxicam and Tylenol last dose Tylenol at 10:00 this morning. Patient states the pain is aching in nature. She denies any numbness and tingling extremities. Patient states pain with range of motion of the left thumb that shoots pain up to the wrist.  Patient states she is right-hand dominant. Patient denies any other injuries during the fall. Patient here for evaluation.     ROS - see HPI, below listed is current ROS at time of my eval:  General:  No fevers, no chills, no weakness  Eyes:  No recent vison changes, no discharge  ENT:  No sore throat, no nasal congestion, no hearing changes  Cardiovascular:  No chest pain, no palpitations  Respiratory:  No shortness of breath, no cough, no wheezing  Gastrointestinal:  No pain, no nausea, no vomiting, no diarrhea  Musculoskeletal:  No muscle pain, no joint pain  Skin:  No rash, no pruritis, no easy bruising  Neurologic:  No speech problems, no headache, no extremity numbness, no extremity tingling, no extremity weakness  Psychiatric:  No anxiety  Genitourinary:  No dysuria, no hematuria  Endocrine:  No unexpected weight gain, no unexpected weight get worse or better.  Patient can also return immediately to the emergency department for any worsening symptoms.  All questions answered.  Patient discharged in stable condition.    Clinical Impression:  1. Left wrist sprain, initial encounter      Disposition referral (if applicable):  Tesha Biswas MD  [facility]  [address] [address]  [phone]    Schedule an appointment as soon as possible for a visit in 3 days  If symptoms worsen and for re-evaluation. call for an appointment.    Evan Allen MD  [address] [address]  [phone]    Schedule an appointment as soon as possible for a visit in 3 days  If symptoms worsen and for re-evaluation. call for an appointment    [facility] Emergency Department  [address] [address]  [phone]  Go in 1 day  If symptoms worsen    Disposition medications (if applicable):  New Prescriptions    No medications on file     ED Provider Disposition Time  DISPOSITION  2:54 PM        Comment: Please note this report has been produced using speech recognition software and may contain errors related to that system including errors in grammar, punctuation, and spelling, as well as words and phrases that may be inappropriate.  Efforts were made to edit the dictations.        Elsy Sykes,   12/29/23 [id number]

## 2024-01-11 ENCOUNTER — OFFICE VISIT (OUTPATIENT)
Dept: BARIATRICS/WEIGHT MGMT | Age: 49
End: 2024-01-11

## 2024-01-11 ENCOUNTER — OFFICE VISIT (OUTPATIENT)
Dept: ORTHOPEDIC SURGERY | Age: 49
End: 2024-01-11
Payer: COMMERCIAL

## 2024-01-11 VITALS
SYSTOLIC BLOOD PRESSURE: 122 MMHG | HEART RATE: 78 BPM | DIASTOLIC BLOOD PRESSURE: 82 MMHG | HEIGHT: 61 IN | WEIGHT: 254.6 LBS | BODY MASS INDEX: 48.07 KG/M2 | OXYGEN SATURATION: 100 %

## 2024-01-11 VITALS
HEIGHT: 61 IN | HEART RATE: 76 BPM | BODY MASS INDEX: 46.82 KG/M2 | RESPIRATION RATE: 16 BRPM | OXYGEN SATURATION: 97 % | WEIGHT: 248 LBS

## 2024-01-11 DIAGNOSIS — Z79.899 MEDICATION MANAGEMENT: ICD-10-CM

## 2024-01-11 DIAGNOSIS — E66.01 MORBID OBESITY WITH BMI OF 45.0-49.9, ADULT (HCC): Primary | ICD-10-CM

## 2024-01-11 DIAGNOSIS — Z01.818 PRE-OPERATIVE CLEARANCE: ICD-10-CM

## 2024-01-11 DIAGNOSIS — M18.12 ARTHRITIS OF CARPOMETACARPAL (CMC) JOINT OF LEFT THUMB: Primary | ICD-10-CM

## 2024-01-11 PROCEDURE — 99214 OFFICE O/P EST MOD 30 MIN: CPT | Performed by: PHYSICIAN ASSISTANT

## 2024-01-11 PROCEDURE — G8427 DOCREV CUR MEDS BY ELIG CLIN: HCPCS | Performed by: PHYSICIAN ASSISTANT

## 2024-01-11 PROCEDURE — 20600 DRAIN/INJ JOINT/BURSA W/O US: CPT | Performed by: PHYSICIAN ASSISTANT

## 2024-01-11 PROCEDURE — G8484 FLU IMMUNIZE NO ADMIN: HCPCS | Performed by: PHYSICIAN ASSISTANT

## 2024-01-11 PROCEDURE — 1036F TOBACCO NON-USER: CPT | Performed by: PHYSICIAN ASSISTANT

## 2024-01-11 PROCEDURE — G8417 CALC BMI ABV UP PARAM F/U: HCPCS | Performed by: PHYSICIAN ASSISTANT

## 2024-01-11 RX ORDER — LEVOTHYROXINE SODIUM 0.03 MG/1
25 TABLET ORAL DAILY
COMMUNITY

## 2024-01-11 RX ORDER — TRIAMCINOLONE ACETONIDE 40 MG/ML
20 INJECTION, SUSPENSION INTRA-ARTICULAR; INTRAMUSCULAR ONCE
Status: COMPLETED | OUTPATIENT
Start: 2024-01-11 | End: 2024-01-11

## 2024-01-11 RX ADMIN — TRIAMCINOLONE ACETONIDE 20 MG: 40 INJECTION, SUSPENSION INTRA-ARTICULAR; INTRAMUSCULAR at 08:45

## 2024-01-11 ASSESSMENT — ENCOUNTER SYMPTOMS
GASTROINTESTINAL NEGATIVE: 1
ALLERGIC/IMMUNOLOGIC NEGATIVE: 1
EYES NEGATIVE: 1
GASTROINTESTINAL NEGATIVE: 1
RESPIRATORY NEGATIVE: 1
EYES NEGATIVE: 1
RESPIRATORY NEGATIVE: 1

## 2024-01-11 NOTE — PATIENT INSTRUCTIONS
Continue weight-bearing as tolerated.  Continue range of motion exercises as instructed.  Ice and elevate as needed.  Tylenol or Motrin for pain.  Injection given into the right thumb CMC.  Follow up as needed.    We are committed to providing you the best care possible.  If you receive a survey after visiting one of our offices, please take time to share your experience concerning your physician office visit.  These surveys are confidential and no health information about you is shared.  We are eager to improve for you and we are counting on your feedback to help make that happen.

## 2024-01-11 NOTE — PROGRESS NOTES
and expected hospital course. Expectations were discussed with the patient and the patient agreed to proceed. The patient previously attended an informational seminar which discussed surgical and non-surgical options as well as procedure specific risks, benefits, and alternatives.      2. Will order initial bariatric surgery labs unless the patient has had any in the previous six months.    3. The patient will be scheduled to be seen by our weight management CNP and registered dietician after having the above labs drawn and resulted.    4. The patient will be referred for an evaluation by physical therapy for assistance with an exercise plan.    5. The patient will be given a bariatric handbook by the dietician at the initial nutrition class. The patient will be instructed to review the handbook and to ask questions about any part which is not clear at any time throughout the workup process. It was stressed to the patient the need to thoroughly review the handbook and the patient agreed.     6. Discussed with patient that losing weight prior to surgery can be indicator of post-operative success. Goal set at this visit for patient for weight loss prior to surgery is 10-20 lbs.     7. Discussed with patient need for long-term follow-up and vitamin supplementation. The patient is agreeable.    8. This patient is a female of reproductive age and was advised she should not become pregnant during the bariatric workup process and for at least 12-18 months after surgery. The patient is agreeable to this plan. Previous hysterectomy.      Thank you for this referral / consult. Please call with any questions or concerns you have.     Patient was seen with total face-to-face time and time spent reviewing chart, placing orders, and reviewing past/current results of labs and images of over 45 minutes. More than 50% of this visit was counseling on diet, nutrition, surgical options, and education as above documented in my

## 2024-01-11 NOTE — PROGRESS NOTES
Review of Systems   Constitutional: Negative.    HENT: Negative.     Eyes: Negative.    Respiratory: Negative.     Cardiovascular: Negative.    Gastrointestinal: Negative.    Genitourinary: Negative.    Musculoskeletal:  Positive for arthralgias and myalgias.   Skin: Negative.  Negative for rash and wound.   Neurological: Negative.    Psychiatric/Behavioral: Negative.           HPI:  Noa Clark is a 48 y.o. female that presents the office today complaining of left wrist/base of thumb pain has been going on since August.  She had an EMG done back in August and she has been having the pain at least since then.  She states that the pain is aching and she notices reduced  strength in that hand and not able to open jars or soda pop bottles.  Pain is worse with range of motion of the thumb.  She did recently have a fall which exacerbated her pain.  She did go and have x-rays which were negative.  She rates her pain at a 6 or 7/10 not improving with over-the-counter pain medications.  Past Medical History:   Diagnosis Date    Anxiety     Depression     GERD (gastroesophageal reflux disease)     H/O echocardiogram 10/19/2023    Ef 55-60%. Grade I diastolic dysfunction. Normal left ventricular size and Mild-to-moderate pulmonic regurgitation present    Headache     migraines    Hx of exercise stress test 10/24/2023    Exercise tolerance is good. Exercised for 6:00 min on Gomez protocol achieving workload of 7.00 METS.    Hyperlipidemia     Palpitation     PONV (postoperative nausea and vomiting)     Thyroid disease     Vitamin D deficiency        Past Surgical History:   Procedure Laterality Date    BREAST REDUCTION SURGERY Bilateral 2007    HYSTERECTOMY (CERVIX STATUS UNKNOWN)  2007    KNEE ARTHROSCOPY Right 5/24/2022    RIGHT KNEE ARTHROSCOPY WITH CHONDROPLASTY performed by Randell Isaac DO at [facility] OR    THYROIDECTOMY, PARTIAL      1992    TYMPANOSTOMY TUBE PLACEMENT      As a child up till 1992       Family History

## 2024-01-18 ENCOUNTER — OFFICE VISIT (OUTPATIENT)
Dept: BARIATRICS/WEIGHT MGMT | Age: 49
End: 2024-01-18
Payer: COMMERCIAL

## 2024-01-18 ENCOUNTER — OFFICE VISIT (OUTPATIENT)
Dept: BARIATRICS/WEIGHT MGMT | Age: 49
End: 2024-01-18

## 2024-01-18 VITALS
BODY MASS INDEX: 48.16 KG/M2 | SYSTOLIC BLOOD PRESSURE: 124 MMHG | DIASTOLIC BLOOD PRESSURE: 80 MMHG | WEIGHT: 255.1 LBS | HEART RATE: 90 BPM | OXYGEN SATURATION: 98 % | HEIGHT: 61 IN

## 2024-01-18 VITALS — HEIGHT: 61 IN | BODY MASS INDEX: 48.16 KG/M2 | WEIGHT: 255.1 LBS

## 2024-01-18 DIAGNOSIS — E66.01 MORBID OBESITY WITH BMI OF 45.0-49.9, ADULT (HCC): Primary | ICD-10-CM

## 2024-01-18 PROCEDURE — 99214 OFFICE O/P EST MOD 30 MIN: CPT | Performed by: NURSE PRACTITIONER

## 2024-01-18 PROCEDURE — G8484 FLU IMMUNIZE NO ADMIN: HCPCS | Performed by: NURSE PRACTITIONER

## 2024-01-18 PROCEDURE — G8427 DOCREV CUR MEDS BY ELIG CLIN: HCPCS | Performed by: NURSE PRACTITIONER

## 2024-01-18 PROCEDURE — 1036F TOBACCO NON-USER: CPT | Performed by: NURSE PRACTITIONER

## 2024-01-18 PROCEDURE — G8417 CALC BMI ABV UP PARAM F/U: HCPCS | Performed by: NURSE PRACTITIONER

## 2024-01-18 PROCEDURE — NBSRV NON-BILLABLE SERVICE: Performed by: SURGERY

## 2024-01-18 NOTE — PROGRESS NOTES
Noa Clark  1975  48 y.o.    SUBJECT CHELSEY:  HPI: Noa Clark presents today for Initial Surgical Weight Loss Class instructed by this provider and registered dietician. Patient had an initial individual consult with Dr. Anand for surgical weight loss management and agrees to follow the program guidelines.      Chief Complaint   Patient presents with    Weight Management     Intial Bar Class    Education Class  Past Medical History:   Diagnosis Date    Anxiety     Depression     GERD (gastroesophageal reflux disease)     H/O echocardiogram 10/19/2023    Ef 55-60%. Grade I diastolic dysfunction. Normal left ventricular size and Mild-to-moderate pulmonic regurgitation present    Headache     migraines    Hx of exercise stress test 10/24/2023    Exercise tolerance is good. Exercised for 6:00 min on Gomez protocol achieving workload of 7.00 METS.    Hyperlipidemia     Palpitation     PONV (postoperative nausea and vomiting)     Thyroid disease     Vitamin D deficiency      Past Surgical History:   Procedure Laterality Date    BREAST REDUCTION SURGERY Bilateral 2007    HYSTERECTOMY (CERVIX STATUS UNKNOWN)  2007    KNEE ARTHROSCOPY Right 5/24/2022    RIGHT KNEE ARTHROSCOPY WITH CHONDROPLASTY performed by Randell Isaac DO at [facility] OR    THYROIDECTOMY, PARTIAL      1992    TYMPANOSTOMY TUBE PLACEMENT      As a child up till 1992     Current Outpatient Medications   Medication Sig Dispense Refill    levothyroxine (SYNTHROID) 25 MCG tablet Take 1 tablet by mouth Daily      losartan (COZAAR) 25 MG tablet Take 1 tablet by mouth daily 90 tablet 2    amLODIPine (NORVASC) 5 MG tablet Take 1.5 tablets by mouth daily Taking 1.5 tablets daily 135 tablet 2    atorvastatin (LIPITOR) 80 MG tablet Take 1 tablet by mouth daily 30 tablet 0    QUEtiapine (SEROQUEL) 50 MG tablet       meloxicam (MOBIC) 7.5 MG tablet       aspirin 81 MG EC tablet Take 1 tablet by mouth daily 90 tablet 1    nitroGLYCERIN (NITROSTAT) 0.4 MG SL tablet

## 2024-01-18 NOTE — PROGRESS NOTES
presented   Provided pt with contact information should questions arise prior to next visit   Will f/u with pt in 2-3 mos for further education and post-op diet instructions    Sari Garcia, RD, LD

## 2024-01-25 ENCOUNTER — HOSPITAL ENCOUNTER (OUTPATIENT)
Dept: PHYSICAL THERAPY | Age: 49
Setting detail: THERAPIES SERIES
Discharge: HOME | End: 2024-01-25
Payer: COMMERCIAL

## 2024-01-25 PROCEDURE — 97161 PT EVAL LOW COMPLEX 20 MIN: CPT

## 2024-01-25 ASSESSMENT — PAIN DESCRIPTION - LOCATION: LOCATION: KNEE

## 2024-01-25 ASSESSMENT — PAIN DESCRIPTION - DESCRIPTORS: DESCRIPTORS: ACHING;STABBING

## 2024-01-25 ASSESSMENT — PAIN SCALES - GENERAL: PAINLEVEL_OUTOF10: 1

## 2024-01-25 ASSESSMENT — PAIN DESCRIPTION - PAIN TYPE: TYPE: CHRONIC PAIN

## 2024-01-25 ASSESSMENT — PAIN DESCRIPTION - ORIENTATION: ORIENTATION: RIGHT;ANTERIOR

## 2024-01-25 NOTE — PROGRESS NOTES
Physical Therapy: Initial Evaluation    Patient: Noa Clark (48 y.o. female)   Examination Date: 2024  Plan of Care Certification Period: 2024 to        :  1975 ;    Confirmed: Yes MRN: [id number]  CSN: [id number]   Insurance: Payor: [facility] / Plan: [facility] MEDICAID / Product Type: *No Product type* /   Insurance ID: [id number] - (Medicaid Managed) Secondary Insurance (if applicable):    Referring Physician: Cristhian Anand II, MD     PCP: Tesha Biswas MD Visits to Date/Visits Approved:   /      No Show/Cancelled Appts:   /       Medical Diagnosis: Morbid obesity with BMI of 45.0-49.9, adult (HCC) [E66.01, Z68.42]  Pre-operative clearance [Z01.818]  Medication management [Z79.899]    Treatment Diagnosis: very min deconditioning, R knee pain     PERTINENT MEDICAL HISTORY   Patient Assessed for Rehabilitation Services: Yes  Self reported health status:: Good    Medical History: Chart Reviewed: Yes   Past Medical History:   Diagnosis Date    Anxiety     Depression     GERD (gastroesophageal reflux disease)     H/O echocardiogram 10/19/2023    Ef 55-60%. Grade I diastolic dysfunction. Normal left ventricular size and Mild-to-moderate pulmonic regurgitation present    Headache     migraines    Hx of exercise stress test 10/24/2023    Exercise tolerance is good. Exercised for 6:00 min on Gomez protocol achieving workload of 7.00 METS.    Hyperlipidemia     Palpitation     PONV (postoperative nausea and vomiting)     Thyroid disease     Vitamin D deficiency      Surgical History:   Past Surgical History:   Procedure Laterality Date    BREAST REDUCTION SURGERY Bilateral     HYSTERECTOMY (CERVIX STATUS UNKNOWN)  2007    KNEE ARTHROSCOPY Right 2022    RIGHT KNEE ARTHROSCOPY WITH CHONDROPLASTY performed by Randell Isaac DO at [facility] OR    THYROIDECTOMY, PARTIAL          TYMPANOSTOMY TUBE PLACEMENT      As a child up till        Medications:   Current Outpatient

## 2024-01-25 NOTE — FLOWSHEET NOTE
Care Interventions:  [x] Therapeutic Exercise  [] Modalities:  [] Therapeutic Activity     [] Ultrasound  [] Estim  [] Gait Training      [] Cervical Traction [] Lumbar Traction  [] Neuromuscular Re-education    [] Cold/hotpack [] Iontophoresis   [x] Instruction in HEP      [] Vasopneumatic   [] Dry Needling    [] Manual Therapy               [] Aquatic Therapy              Electronically signed by:  Sulaiman Parker, PT, DPT, OCS  1/25/2024, 8:56 AM    1/25/2024 8:56 AM

## 2024-01-25 NOTE — PLAN OF CARE
[facility]  [facility]  [address]  [place] [address]  Dept: [phone]  Dept Fax: [phone]  Loc: [phone]    PHYSICAL THERAPY PLAN OF CARE: INITIAL EVALUATION    Patient: Noa Clark (48 y.o. female)   Examination Date: 2024  Plan of Care Certification Period: 2024 to        :  1975  MRN: [id number]  CSN: [id number]   Insurance: Payor: [facility] / Plan: [facility] MEDICAID / Product Type: *No Product type* /   Insurance ID: [id number] - (Medicaid Managed) Secondary Insurance (if applicable):    Referring Physician: Cristhian Anand II, MD     PCP: Tesha Biswas MD Visits to Date/Visits Approved:   /      No Show/Cancelled Appts:   /       Medical Diagnosis: Morbid obesity with BMI of 45.0-49.9, adult ([facility]) [E66.01, Z68.42]  Pre-operative clearance [Z01.818]  Medication management [Z79.899]    Treatment Diagnosis: very min deconditioning, R knee pain       ASSESSMENT     Impression:  Pt is 48 year old female currently enrolled in weight management program. Pt now has difficulties completing prolonged activities including standing and walking due to fatigue and progressive pain. Pt demo deficits this date that include deconditioning and R knee pain with continued activity. Pt will benefit with PT services with return for education on physical activity to safely increase activity to prevent injury and safe weight loss. Patient agrees with established plan of care and assisted in the development of their short term and long term goals. Patient had no adverse reaction with initial treatment and there are no barriers to learning. Demonstrates no mental or cognitive disorder     Body Structures, Functions, Activity Limitations Requiring Skilled Therapeutic Intervention: Decreased functional mobility , Decreased endurance    Statement of Medical Necessity: Physical Therapy is both indicated and medically necessary as

## 2024-01-26 ENCOUNTER — HOSPITAL ENCOUNTER (OUTPATIENT)
Age: 49
Discharge: HOME | End: 2024-01-26
Payer: COMMERCIAL

## 2024-01-26 DIAGNOSIS — I10 ESSENTIAL HYPERTENSION: ICD-10-CM

## 2024-01-26 DIAGNOSIS — G47.33 OBSTRUCTIVE SLEEP APNEA: ICD-10-CM

## 2024-01-26 DIAGNOSIS — R06.02 SHORTNESS OF BREATH: ICD-10-CM

## 2024-01-26 DIAGNOSIS — Z01.818 PRE-OPERATIVE CLEARANCE: ICD-10-CM

## 2024-01-26 DIAGNOSIS — E66.01 MORBID OBESITY WITH BMI OF 45.0-49.9, ADULT (HCC): ICD-10-CM

## 2024-01-26 DIAGNOSIS — Z79.899 MEDICATION MANAGEMENT: ICD-10-CM

## 2024-01-26 DIAGNOSIS — R07.9 CHEST PAIN, UNSPECIFIED TYPE: ICD-10-CM

## 2024-01-26 DIAGNOSIS — E66.01 CLASS 3 SEVERE OBESITY DUE TO EXCESS CALORIES WITH SERIOUS COMORBIDITY AND BODY MASS INDEX (BMI) OF 45.0 TO 49.9 IN ADULT (HCC): ICD-10-CM

## 2024-01-26 DIAGNOSIS — E78.5 DYSLIPIDEMIA: ICD-10-CM

## 2024-01-26 LAB
25(OH)D3 SERPL-MCNC: 25.32 NG/ML
ALBUMIN SERPL-MCNC: 4.2 GM/DL (ref 3.4–5)
ALP BLD-CCNC: 83 IU/L (ref 40–129)
ALT SERPL-CCNC: 19 U/L (ref 10–40)
ANION GAP SERPL CALCULATED.3IONS-SCNC: 12 MMOL/L (ref 7–16)
AST SERPL-CCNC: 20 IU/L (ref 15–37)
BASOPHILS ABSOLUTE: 0 K/CU MM
BASOPHILS RELATIVE PERCENT: 0.5 % (ref 0–1)
BILIRUB SERPL-MCNC: 1.1 MG/DL (ref 0–1)
BUN SERPL-MCNC: 21 MG/DL (ref 6–23)
CALCIUM SERPL-MCNC: 9.2 MG/DL (ref 8.3–10.6)
CHLORIDE BLD-SCNC: 104 MMOL/L (ref 99–110)
CHOLEST SERPL-MCNC: 157 MG/DL
CHOLESTEROL, FASTING: 157 MG/DL
CO2: 27 MMOL/L (ref 21–32)
CREAT SERPL-MCNC: 0.6 MG/DL (ref 0.6–1.1)
DIFFERENTIAL TYPE: ABNORMAL
EOSINOPHILS ABSOLUTE: 0.2 K/CU MM
EOSINOPHILS RELATIVE PERCENT: 2 % (ref 0–3)
ESTIMATED AVERAGE GLUCOSE: 105 MG/DL
FOLATE SERPL-MCNC: 13.9 NG/ML (ref 3.1–17.5)
GFR SERPL CREATININE-BSD FRML MDRD: >60 ML/MIN/1.73M2
GLUCOSE SERPL-MCNC: 85 MG/DL (ref 70–99)
HBA1C MFR BLD: 5.3 % (ref 4.2–6.3)
HCT VFR BLD CALC: 40.8 % (ref 37–47)
HDLC SERPL-MCNC: 54 MG/DL
HDLC SERPL-MCNC: 54 MG/DL
HEMOGLOBIN: 12.8 GM/DL (ref 12.5–16)
IMMATURE NEUTROPHIL %: 0.2 % (ref 0–0.43)
IRON: 82 UG/DL (ref 37–145)
LDLC SERPL CALC-MCNC: 90 MG/DL
LDLC SERPL CALC-MCNC: 90 MG/DL
LYMPHOCYTES ABSOLUTE: 1.8 K/CU MM
LYMPHOCYTES RELATIVE PERCENT: 19.9 % (ref 24–44)
MAGNESIUM: 2.2 MG/DL (ref 1.8–2.4)
MCH RBC QN AUTO: 29.4 PG (ref 27–31)
MCHC RBC AUTO-ENTMCNC: 31.4 % (ref 32–36)
MCV RBC AUTO: 93.6 FL (ref 78–100)
MONOCYTES ABSOLUTE: 0.4 K/CU MM
MONOCYTES RELATIVE PERCENT: 4.4 % (ref 0–4)
PCT TRANSFERRIN: 23 % (ref 10–44)
PDW BLD-RTO: 13.1 % (ref 11.7–14.9)
PLATELET # BLD: 344 K/CU MM (ref 140–440)
PMV BLD AUTO: 10 FL (ref 7.5–11.1)
POTASSIUM SERPL-SCNC: 3.9 MMOL/L (ref 3.5–5.1)
RBC # BLD: 4.36 M/CU MM (ref 4.2–5.4)
SEGMENTED NEUTROPHILS ABSOLUTE COUNT: 6.5 K/CU MM
SEGMENTED NEUTROPHILS RELATIVE PERCENT: 73 % (ref 36–66)
SODIUM BLD-SCNC: 143 MMOL/L (ref 135–145)
TOTAL IMMATURE NEUTOROPHIL: 0.02 K/CU MM
TOTAL IRON BINDING CAPACITY: 355 UG/DL (ref 250–450)
TOTAL PROTEIN: 7.1 GM/DL (ref 6.4–8.2)
TRIGL SERPL-MCNC: 64 MG/DL
TRIGLYCERIDE, FASTING: 63 MG/DL
TSH SERPL DL<=0.005 MIU/L-ACNC: 0.85 UIU/ML (ref 0.27–4.2)
UNSATURATED IRON BINDING CAPACITY: 273 UG/DL (ref 110–370)
VITAMIN B-12: 505.6 PG/ML (ref 211–911)
WBC # BLD: 8.8 K/CU MM (ref 4–10.5)

## 2024-01-26 PROCEDURE — 85025 COMPLETE CBC W/AUTO DIFF WBC: CPT

## 2024-01-26 PROCEDURE — 83036 HEMOGLOBIN GLYCOSYLATED A1C: CPT

## 2024-01-26 PROCEDURE — 36415 COLL VENOUS BLD VENIPUNCTURE: CPT

## 2024-01-26 PROCEDURE — 82306 VITAMIN D 25 HYDROXY: CPT

## 2024-01-26 PROCEDURE — 82607 VITAMIN B-12: CPT

## 2024-01-26 PROCEDURE — 84443 ASSAY THYROID STIM HORMONE: CPT

## 2024-01-26 PROCEDURE — 80061 LIPID PANEL: CPT

## 2024-01-26 PROCEDURE — 83540 ASSAY OF IRON: CPT

## 2024-01-26 PROCEDURE — 84630 ASSAY OF ZINC: CPT

## 2024-01-26 PROCEDURE — 80053 COMPREHEN METABOLIC PANEL: CPT

## 2024-01-26 PROCEDURE — 83970 ASSAY OF PARATHORMONE: CPT

## 2024-01-26 PROCEDURE — 83550 IRON BINDING TEST: CPT

## 2024-01-26 PROCEDURE — 82746 ASSAY OF FOLIC ACID SERUM: CPT

## 2024-01-26 PROCEDURE — 83735 ASSAY OF MAGNESIUM: CPT

## 2024-01-28 LAB — PTH-INTACT SERPL-MCNC: 47 PG/ML (ref 15–65)

## 2024-01-29 LAB — ZINC SERPL-MCNC: 78.1 UG/DL (ref 60–120)

## 2024-01-31 ENCOUNTER — OFFICE VISIT (OUTPATIENT)
Dept: ORTHOPEDIC SURGERY | Age: 49
End: 2024-01-31
Payer: COMMERCIAL

## 2024-01-31 VITALS
HEART RATE: 82 BPM | HEIGHT: 61 IN | BODY MASS INDEX: 47.2 KG/M2 | RESPIRATION RATE: 13 BRPM | OXYGEN SATURATION: 97 % | WEIGHT: 250 LBS

## 2024-01-31 DIAGNOSIS — M17.11 PRIMARY OSTEOARTHRITIS OF RIGHT KNEE: Primary | ICD-10-CM

## 2024-01-31 PROCEDURE — 99213 OFFICE O/P EST LOW 20 MIN: CPT | Performed by: ORTHOPAEDIC SURGERY

## 2024-01-31 PROCEDURE — G8484 FLU IMMUNIZE NO ADMIN: HCPCS | Performed by: ORTHOPAEDIC SURGERY

## 2024-01-31 PROCEDURE — 1036F TOBACCO NON-USER: CPT | Performed by: ORTHOPAEDIC SURGERY

## 2024-01-31 PROCEDURE — G8417 CALC BMI ABV UP PARAM F/U: HCPCS | Performed by: ORTHOPAEDIC SURGERY

## 2024-01-31 PROCEDURE — G8427 DOCREV CUR MEDS BY ELIG CLIN: HCPCS | Performed by: ORTHOPAEDIC SURGERY

## 2024-01-31 RX ORDER — TIZANIDINE 4 MG/1
TABLET ORAL
COMMUNITY
Start: 2024-01-19

## 2024-01-31 NOTE — PATIENT INSTRUCTIONS
We will schedule surgery at soonest convenience. If you have any questions regarding your surgery please call our office and ask to speak with Praveena [phone]

## 2024-02-01 DIAGNOSIS — M17.11 PRIMARY OSTEOARTHRITIS OF RIGHT KNEE: Primary | ICD-10-CM

## 2024-02-01 DIAGNOSIS — G89.29 CHRONIC PAIN OF RIGHT KNEE: ICD-10-CM

## 2024-02-01 DIAGNOSIS — M25.561 CHRONIC PAIN OF RIGHT KNEE: ICD-10-CM

## 2024-02-01 ASSESSMENT — ENCOUNTER SYMPTOMS
COLOR CHANGE: 0
CHEST TIGHTNESS: 0
EYE REDNESS: 0
BACK PAIN: 0
ABDOMINAL PAIN: 0

## 2024-02-01 NOTE — PROGRESS NOTES
Patient returns to the office with right knee pain. Pt stated that the pain has become more severe and is ready to discuss knee replacement. Pt had gel injections in October which were short-term relief. Pt stated she has a constant pain and noticed more swelling than usual due to activities. Pt denies injuries.   
Capillary refill takes less than 2 seconds.      Coloration: Skin is not pale.      Findings: No erythema or rash.   Neurological:      Mental Status: She is alert and oriented to person, place, and time.      Sensory: No sensory deficit.      Motor: No weakness.       Right knee-Skin intact with no erythema, ecchymosis or lacerations present.  0-140    XRAY  X-ray 3 views of the right knee from August 23, 2023 reviewed by me today in the office demonstrates age appropriate bone density throughout with moderate degenerative changes with approximately 75% medial and patellofemoral joint space narrowing, early osteophyte formation in the medial and patellofemoral compartments with normal tracking of the patella.    Assessment:      Right knee OA, moderate to severe      Plan:      I discussed with her again today her x-ray findings.  I also discussed with her today her response to the gel injections for the right knee.  At this point given her persistent and worsening symptoms despite conservative treatment and with her x-ray findings I recommend surgical treatment.  I had a lengthy discussion with her today in regards to right total knee replacement surgery.  I explained risks, benefits, possible complications of the procedure and answered all questions for the patient.    I explained postoperative rehabilitation protocol and expectations with the patient today.  The patient understands and consents to the procedure.    Patient will follow up with their primary care physician prior to surgical treatment for preoperative clearance.  We will schedule surgery at soonest convenience.  Continue weight-bearing as tolerated.  Continue range of motion exercises as instructed.  Ice and elevate as needed.  Tylenol or Motrin for pain.  Follow up in 3 weeks postop.  She would like to spend the night in the hospital after surgery and would likely be a press-fit candidate.  We we will schedule this for about 2 weeks after Ovidio's

## 2024-02-02 ENCOUNTER — TELEPHONE (OUTPATIENT)
Dept: ORTHOPEDIC SURGERY | Age: 49
End: 2024-02-02

## 2024-02-02 NOTE — TELEPHONE ENCOUNTER
Patient scheduled for    Right Total Knee Arthroplasty  Date: 3/28/24  Facility: [facility]  Surgeon: Randell Isaac D.O.  Product: Salvador    Surgery Request created/emailed 2/1/24  Pathway Orders scanned 2/1/24  PCP Clearance routed via [employer] to Dr. Biswas 2/2/24  Cardiac Clearance routed via [employer] to Dr. Estevez 2/2/24    Pre Op Appt 1/31/24    [facility] Insurance  Contacted 2/1/24 via [employer] with clinicals uploaded  Status: Pending CPT 33392 ICD M17.11/M25.561 for 23hr observation  Auth# [id number]    Psychiatric PAT 3/13/24, JRA 3/11/24    PT order submitted to Kimberly Morris 2/1/24

## 2024-02-08 ENCOUNTER — OFFICE VISIT (OUTPATIENT)
Dept: BARIATRICS/WEIGHT MGMT | Age: 49
End: 2024-02-08
Payer: COMMERCIAL

## 2024-02-08 VITALS
HEIGHT: 61 IN | DIASTOLIC BLOOD PRESSURE: 84 MMHG | WEIGHT: 253.9 LBS | BODY MASS INDEX: 47.94 KG/M2 | OXYGEN SATURATION: 98 % | HEART RATE: 91 BPM | SYSTOLIC BLOOD PRESSURE: 116 MMHG

## 2024-02-08 DIAGNOSIS — E66.01 MORBID OBESITY WITH BMI OF 45.0-49.9, ADULT (HCC): Primary | ICD-10-CM

## 2024-02-08 DIAGNOSIS — I10 ESSENTIAL HYPERTENSION: ICD-10-CM

## 2024-02-08 DIAGNOSIS — Z01.818 PRE-OP EVALUATION: ICD-10-CM

## 2024-02-08 DIAGNOSIS — E78.00 ELEVATED LOW DENSITY LIPOPROTEIN (LDL) CHOLESTEROL LEVEL: ICD-10-CM

## 2024-02-08 PROCEDURE — G8427 DOCREV CUR MEDS BY ELIG CLIN: HCPCS | Performed by: NURSE PRACTITIONER

## 2024-02-08 PROCEDURE — G8417 CALC BMI ABV UP PARAM F/U: HCPCS | Performed by: NURSE PRACTITIONER

## 2024-02-08 PROCEDURE — G8484 FLU IMMUNIZE NO ADMIN: HCPCS | Performed by: NURSE PRACTITIONER

## 2024-02-08 PROCEDURE — 1036F TOBACCO NON-USER: CPT | Performed by: NURSE PRACTITIONER

## 2024-02-08 PROCEDURE — 3074F SYST BP LT 130 MM HG: CPT | Performed by: NURSE PRACTITIONER

## 2024-02-08 PROCEDURE — 3079F DIAST BP 80-89 MM HG: CPT | Performed by: NURSE PRACTITIONER

## 2024-02-08 PROCEDURE — 99213 OFFICE O/P EST LOW 20 MIN: CPT | Performed by: NURSE PRACTITIONER

## 2024-02-08 NOTE — PROGRESS NOTES
BARIATRIC SURGERY OFFICE PROGRESS NOTE    SUBJECTIVE:    Patient presenting today referred from Tesha Biswas MD, for   Chief Complaint   Patient presents with    Weight Management     2nd Surg WM Visit - Card, -Pulm, -Psych  Labs Resulted  Has Bar Cov   .    Vitals:    02/08/24 1303   BP: 116/84   Pulse: 91   SpO2: 98%        BMI: Body mass index is 47.97 kg/m².     Weight History:   Wt Readings from Last 3 Encounters:   02/08/24 115.2 kg (253 lb 14.4 oz)   01/31/24 113.4 kg (250 lb)   01/18/24 115.7 kg (255 lb 1.6 oz)         HPI:Noa Clark is a 49 y.o. female presenting in second bariatric PRE-OP visit    Diet Recall:    Total weight loss/gain: -1.2 lbs since last visit, and -0.7 lbs since starting program     Protein: not tracking calories thus far; no longer drinking soda  Water Intake: 50 oz daily  Exercise: rides bike every day  New health problems: reviewed with patient  New medications: reviewed with patient   Clearances:  -- Cardiology: referral sent  -- Pulmonology: referral sent  -- Psychology: referral sent     Thoroughly reviewed the patient's medical history, family history, social history and review of systems with the patient today in the office.  Please see medical record for pertinent positives.      Past Medical History:   Diagnosis Date    Anxiety     Depression     GERD (gastroesophageal reflux disease)     H/O echocardiogram 10/19/2023    Ef 55-60%. Grade I diastolic dysfunction. Normal left ventricular size and Mild-to-moderate pulmonic regurgitation present    Headache     migraines    Hx of exercise stress test 10/24/2023    Exercise tolerance is good. Exercised for 6:00 min on Gomez protocol achieving workload of 7.00 METS.    Hyperlipidemia     Palpitation     PONV (postoperative nausea and vomiting)     Thyroid disease     Vitamin D deficiency         Patient Active Problem List   Diagnosis    TIA (transient ischemic attack)    Paresthesia of tongue    Migraine headache without aura

## 2024-02-09 ENCOUNTER — HOSPITAL ENCOUNTER (OUTPATIENT)
Dept: PHYSICAL THERAPY | Age: 49
Setting detail: THERAPIES SERIES
Discharge: HOME | End: 2024-02-09
Payer: COMMERCIAL

## 2024-02-09 PROCEDURE — 97110 THERAPEUTIC EXERCISES: CPT

## 2024-02-09 PROCEDURE — 97162 PT EVAL MOD COMPLEX 30 MIN: CPT

## 2024-02-09 NOTE — PROGRESS NOTES
Observations:       Palpation:        Ambulation/Gait (if applicable):       Balance Screen:       Neuro Screen:  {Neuro Screen:607450282}  Left AROM  Right AROM                    Left PROM  Right PROM                    Left Strength  Right Strength                    Cervical Assessment             Thoracic Assessment            Lumbar Assessment           Trunk Strength           Muscle Length/Flexibility:      Joint Mobility (if applicable):        Special Tests:        Balance/Gait Assessment(s) Performed:  {Balance/Gait Assessment(s) Performed:944978657}    Additional Finding(s) (if applicable):    {LE Objectives Assessed:619740796}       ASSESSMENT     Impression:      Body Structures, Functions, Activity Limitations Requiring Skilled Therapeutic Intervention: Decreased functional mobility     Statement of Medical Necessity: Physical Therapy is both indicated and medically necessary as outlined in the POC to increase the likelihood of meeting the functionally related goals stated below.     Patient's Activity Tolerance:        Patient's rehabilitation potential/prognosis is considered to be:      Factors which may impact rehabilitation potential include:          GOALS   Patient Goal(s):    Short Term Goals Completed by   Goal Status                                                                   Long Term Goals Completed by   Goal Status                                                                    TREATMENT PLAN       Requires PT Follow-Up: Yes    Pt. actively involved in establishing Plan of Care and Goals: {YES / NO:54105::\"Yes\"}  Patient/ Caregiver education and instruction:               Treatment may include any combination of the following:       Frequency / Duration:  Patient to be seen   for   weeks      Eval Complexity:    Decision Making: Medium Complexity  History: PF R knee OA  Exam: ROM/ strength  Clinical Presentation: evolving    PT Treatment Completed:  {PT Treatments

## 2024-02-09 NOTE — PLAN OF CARE
Outpatient Physical Therapy           [place]           [] Phone: [phone]   Fax: [phone]  [place]           [] Phone: [phone]   Fax: [phone]     To: Randell Isaac DO     From: ZACKERY Mullins PT, PT     Patient: Noa Clark       : 1975  Diagnosis: Primary osteoarthritis of right knee [M17.11]  Chronic pain of right knee [M25.561, G89.29]    Treatment Diagnosis: R knee pain  Date: 2024    Physical Therapy Certification/Re-Certification Form  Dear   The following patient has been evaluated for physical therapy services and for therapy to continue, insurance requires physician review of the treatment plan initially and every 90 days. Please review the attached evaluation and/or summary of the patient's plan of care, and verify that you agree therapy should continue by signing the attached document and sending it back to our office.    Assessment:         Plan of Care/Treatment to date:  [] Therapeutic Exercise  [] Modalities:  [] Therapeutic Activity     [] Ultrasound  [] Electrical Stimulation  [] Gait Training      [] Cervical Traction [] Lumbar Traction  [] Neuromuscular Re-education    [] Cold/hotpack [] Iontophoresis   [] Instruction in HEP      [] Vasopneumatic    [] Dry Needling  [] Manual Therapy               [] Aquatic Therapy       Other:          Frequency/Duration:  # Days per week: [] 1 day # Weeks: [] 1 week [] 5 weeks     [] 2 days   [] 2 weeks [] 6 weeks     [] 3 days   [] 3 weeks [] 7 weeks     [] 4 days   [] 4 weeks [] 8 weeks         [] 9 weeks [] 10 weeks         [] 11 weeks [] 12 weeks    Rehab Potential/Progress: [] Excellent [] Good [] Fair  [] Poor     Goals:    Patient goals:    Short term goals  Time Frame for Short term goals:                   Long Term Goals  Time Frame for Long Term Goals:                         Electronically signed by:  ZACKERY Mullins PT, PT, 2024, 5:57 PM        If you have any questions or concerns, please don't hesitate

## 2024-02-14 DIAGNOSIS — M17.11 PRIMARY OSTEOARTHRITIS OF RIGHT KNEE: ICD-10-CM

## 2024-02-15 ENCOUNTER — OFFICE VISIT (OUTPATIENT)
Dept: CARDIOLOGY CLINIC | Age: 49
End: 2024-02-15
Payer: COMMERCIAL

## 2024-02-15 VITALS
WEIGHT: 253 LBS | SYSTOLIC BLOOD PRESSURE: 118 MMHG | DIASTOLIC BLOOD PRESSURE: 76 MMHG | BODY MASS INDEX: 47.77 KG/M2 | HEART RATE: 90 BPM | HEIGHT: 61 IN

## 2024-02-15 DIAGNOSIS — R06.02 SHORTNESS OF BREATH: ICD-10-CM

## 2024-02-15 DIAGNOSIS — G47.33 OBSTRUCTIVE SLEEP APNEA: ICD-10-CM

## 2024-02-15 DIAGNOSIS — R07.9 CHEST PAIN, UNSPECIFIED TYPE: ICD-10-CM

## 2024-02-15 DIAGNOSIS — Z01.810 PREOP CARDIOVASCULAR EXAM: ICD-10-CM

## 2024-02-15 DIAGNOSIS — E78.5 DYSLIPIDEMIA: ICD-10-CM

## 2024-02-15 DIAGNOSIS — I10 ESSENTIAL HYPERTENSION: Primary | ICD-10-CM

## 2024-02-15 PROCEDURE — 1036F TOBACCO NON-USER: CPT | Performed by: INTERNAL MEDICINE

## 2024-02-15 PROCEDURE — 93000 ELECTROCARDIOGRAM COMPLETE: CPT | Performed by: INTERNAL MEDICINE

## 2024-02-15 PROCEDURE — G8417 CALC BMI ABV UP PARAM F/U: HCPCS | Performed by: INTERNAL MEDICINE

## 2024-02-15 PROCEDURE — G8427 DOCREV CUR MEDS BY ELIG CLIN: HCPCS | Performed by: INTERNAL MEDICINE

## 2024-02-15 PROCEDURE — 99214 OFFICE O/P EST MOD 30 MIN: CPT | Performed by: INTERNAL MEDICINE

## 2024-02-15 PROCEDURE — 3078F DIAST BP <80 MM HG: CPT | Performed by: INTERNAL MEDICINE

## 2024-02-15 PROCEDURE — 3074F SYST BP LT 130 MM HG: CPT | Performed by: INTERNAL MEDICINE

## 2024-02-15 PROCEDURE — G8484 FLU IMMUNIZE NO ADMIN: HCPCS | Performed by: INTERNAL MEDICINE

## 2024-02-15 NOTE — PROGRESS NOTES
Emeka Estevez MD                                  CARDIOLOGY  NOTE        Chief Complaint:    Chief Complaint   Patient presents with    3 Month Follow-Up     Patient denies chest pain, palpitations, SOB, dizziness and edema. Patient having right TKA 3/28, also going to be having bariatric surgery is TBD in June.      Has been doing fairly well  Better blood pressure control    ECHO 10/19/2023     Left ventricular systolic function is normal with an ejection fraction of   55-60%.   Grade I diastolic dysfunction.   Normal left ventricular size and   Mild-to-moderate pulmonic regurgitation present.   No evidence of pericardial effusion.     EST 10/24/2023     Normal near maximal study negative for angina or ECG evidence of ischemia.   Exercise tolerance is good. Exercised for 6:00 min on Gomez protocol   achieving workload of 7.00 METS.   Appropriate hemodynamic response to exercise is noted.   Alonzo treadmill score is +6 considered Low risk for cardiac events.        Prior history    Noa is a 49 y.o. year old female who recently presented to the ER with a chief complaint of chest pain    Patient mentioned that a few years back she was on antihypertensive medications however was discontinued by PCPs office.  Recently patient has been experiencing retrosternal chest discomfort 5 out of 10 mostly exertional and associated with shortness of breath occasional palpitations.  Patient denies any dependent edema  She complains of fatigue and tiredness through the day, is unsure about snoring at night.  She denies any smoking alcohol or drug abuse.  Patient has prior history of migraines, questionable TIA with hypertensive urgency in the past.      She mentioned that she has been experiencing intermittent chest discomfort almost on a daily basis which is exacerbated with exertion.        Current Outpatient Medications   Medication Sig Dispense Refill    tiZANidine (ZANAFLEX) 4 MG tablet       levothyroxine (SYNTHROID) 25

## 2024-02-16 ENCOUNTER — HOSPITAL ENCOUNTER (OUTPATIENT)
Dept: PULMONOLOGY | Age: 49
Discharge: HOME | End: 2024-02-16
Attending: INTERNAL MEDICINE
Payer: COMMERCIAL

## 2024-02-16 ENCOUNTER — HOSPITAL ENCOUNTER (OUTPATIENT)
Age: 49
Discharge: HOME | End: 2024-02-16
Attending: INTERNAL MEDICINE
Payer: COMMERCIAL

## 2024-02-16 ENCOUNTER — HOSPITAL ENCOUNTER (OUTPATIENT)
Dept: GENERAL RADIOLOGY | Age: 49
Discharge: HOME | End: 2024-02-16
Attending: INTERNAL MEDICINE
Payer: COMMERCIAL

## 2024-02-16 DIAGNOSIS — Z01.818 PRE-OP EVALUATION: ICD-10-CM

## 2024-02-16 DIAGNOSIS — R06.02 SHORTNESS OF BREATH: ICD-10-CM

## 2024-02-16 LAB
DLCO %PRED: 114 %
DLCO PRED: NORMAL
DLCO/VA %PRED: NORMAL
DLCO/VA PRED: NORMAL
DLCO/VA: NORMAL
DLCO: NORMAL
EXPIRATORY TIME-POST: NORMAL
EXPIRATORY TIME: NORMAL
FEF 25-75 %CHNG: NORMAL
FEF 25-75 POST %PRED: NORMAL
FEF 25-75% %PRED-PRE: NORMAL
FEF 25-75% PRED: NORMAL
FEF 25-75-POST: NORMAL
FEF 25-75-PRE: NORMAL
FEV1 %PRED-POST: 113 %
FEV1 %PRED-PRE: 108 %
FEV1 PRED: NORMAL
FEV1-POST: NORMAL
FEV1-PRE: NORMAL
FEV1/FVC %PRED-POST: 100 %
FEV1/FVC %PRED-PRE: 97 %
FEV1/FVC PRED: NORMAL
FEV1/FVC-POST: NORMAL
FEV1/FVC-PRE: NORMAL
FVC %PRED-POST: 111 L
FVC %PRED-PRE: 111 %
FVC PRED: NORMAL
FVC-POST: NORMAL
FVC-PRE: NORMAL
GAW %PRED: NORMAL
GAW PRED: NORMAL
GAW: NORMAL
IC PRE %PRED: NORMAL
IC PRED: NORMAL
IC: NORMAL
MEP: NORMAL
MIP: NORMAL
MVV %PRED-PRE: NORMAL
MVV PRED: NORMAL
MVV-PRE: NORMAL
PEF %PRED-POST: NORMAL
PEF %PRED-PRE: NORMAL
PEF PRED: NORMAL
PEF%CHNG: NORMAL
PEF-POST: NORMAL
PEF-PRE: NORMAL
RAW %PRED: NORMAL
RAW PRED: NORMAL
RAW: NORMAL
RV PRE %PRED: NORMAL
RV PRED: NORMAL
RV: NORMAL
SVC %PRED: NORMAL
SVC PRED: NORMAL
SVC: NORMAL
TLC PRE %PRED: 102 %
TLC PRED: NORMAL
TLC: NORMAL
VA %PRED: NORMAL
VA PRED: NORMAL
VA: NORMAL
VTG %PRED: NORMAL
VTG PRED: NORMAL
VTG: NORMAL

## 2024-02-16 PROCEDURE — 94729 DIFFUSING CAPACITY: CPT

## 2024-02-16 PROCEDURE — 94060 EVALUATION OF WHEEZING: CPT

## 2024-02-16 PROCEDURE — 71046 X-RAY EXAM CHEST 2 VIEWS: CPT

## 2024-02-16 PROCEDURE — 94726 PLETHYSMOGRAPHY LUNG VOLUMES: CPT

## 2024-02-16 ASSESSMENT — PULMONARY FUNCTION TESTS
FEV1/FVC_PERCENT_PREDICTED_PRE: 97
FEV1_PERCENT_PREDICTED_POST: 113
FVC_PERCENT_PREDICTED_POST: 111
FEV1/FVC_PERCENT_PREDICTED_POST: 100
FVC_PERCENT_PREDICTED_PRE: 111
FEV1_PERCENT_PREDICTED_PRE: 108

## 2024-02-28 ENCOUNTER — HOSPITAL ENCOUNTER (OUTPATIENT)
Dept: PHYSICAL THERAPY | Age: 49
Setting detail: THERAPIES SERIES
Discharge: HOME | End: 2024-02-28
Payer: COMMERCIAL

## 2024-02-28 PROCEDURE — 97150 GROUP THERAPEUTIC PROCEDURES: CPT

## 2024-02-28 NOTE — DISCHARGE SUMMARY
Outpatient Physical Therapy                                                                    [place]           [x] Phone: [phone]   Fax: [phone]  [place]           [] Phone: [phone]   Fax: [phone]          Physical Therapy Daily Treatment Note  Date:  2024    Patient Name:  Noa Clark    :  1975  MRN: [id number]  Restrictions/Precautions:    Pain level: 0/10     Noa Clark was seen today for the physical activity education presentation.     Patient was screened for Covid symptoms and their temperature was taken. Patient does not have symptoms warranting delaying services. Will proceed with today's session.     The purpose of the group exercises presentation was to present information to the individual for fitness and wellness.    Part one of the presentation we spoke about the reasons why exercises and wellness is beneficial. We presented data and examples of different symptoms in the body improve as well improvement with sleep, anxiety, depression weight loss and improve in lung function.    Part 2- Setting goals was described. SMART goals and formation of short term and long term goals to help be successful. Patient was referred back to their packet for example worksheet.    Part 3- Examples of how to get started. Planning your exercises routine to be successful. Remember to be start slow and gradually increase in the intensity at home. Drinking plenty of fluid and always perform a warm up or cool down.     Part 4- Exercises Basics - demonstrated the importance of a 5-10 min warm up with light stretches. Cool down to allow the body to return to base line measure. They demonstrated how to take their heart rate and why it is important to know the target range zone for fitness.    Explained the Do's and Don'ts and the PRICE principal if injured. Explained they are to seek medical advice if severe pain, numbness, swelling or radicular symptoms occur.    Patient was guided

## 2024-03-06 ENCOUNTER — OFFICE VISIT (OUTPATIENT)
Dept: BARIATRICS/WEIGHT MGMT | Age: 49
End: 2024-03-06
Payer: COMMERCIAL

## 2024-03-06 VITALS
SYSTOLIC BLOOD PRESSURE: 100 MMHG | BODY MASS INDEX: 47.33 KG/M2 | WEIGHT: 250.7 LBS | HEIGHT: 61 IN | DIASTOLIC BLOOD PRESSURE: 82 MMHG | OXYGEN SATURATION: 98 % | HEART RATE: 92 BPM

## 2024-03-06 DIAGNOSIS — Z01.818 PRE-OP EVALUATION: ICD-10-CM

## 2024-03-06 DIAGNOSIS — E78.00 ELEVATED LOW DENSITY LIPOPROTEIN (LDL) CHOLESTEROL LEVEL: ICD-10-CM

## 2024-03-06 DIAGNOSIS — I10 ESSENTIAL HYPERTENSION: ICD-10-CM

## 2024-03-06 DIAGNOSIS — E66.01 MORBID OBESITY WITH BMI OF 45.0-49.9, ADULT (HCC): Primary | ICD-10-CM

## 2024-03-06 PROCEDURE — G8427 DOCREV CUR MEDS BY ELIG CLIN: HCPCS | Performed by: NURSE PRACTITIONER

## 2024-03-06 PROCEDURE — 99213 OFFICE O/P EST LOW 20 MIN: CPT | Performed by: NURSE PRACTITIONER

## 2024-03-06 PROCEDURE — G8417 CALC BMI ABV UP PARAM F/U: HCPCS | Performed by: NURSE PRACTITIONER

## 2024-03-06 PROCEDURE — 3074F SYST BP LT 130 MM HG: CPT | Performed by: NURSE PRACTITIONER

## 2024-03-06 PROCEDURE — 3079F DIAST BP 80-89 MM HG: CPT | Performed by: NURSE PRACTITIONER

## 2024-03-06 PROCEDURE — G8484 FLU IMMUNIZE NO ADMIN: HCPCS | Performed by: NURSE PRACTITIONER

## 2024-03-06 PROCEDURE — 1036F TOBACCO NON-USER: CPT | Performed by: NURSE PRACTITIONER

## 2024-03-06 RX ORDER — BUPROPION HYDROCHLORIDE 100 MG/1
100 TABLET ORAL 2 TIMES DAILY
COMMUNITY
Start: 2024-02-27

## 2024-03-06 NOTE — PROGRESS NOTES
BARIATRIC SURGERY OFFICE PROGRESS NOTE    SUBJECTIVE:    Patient presenting today referred from Tesha Biswas MD, for   Chief Complaint   Patient presents with    Weight Management     3rd Surg WM Visit   .    Vitals:    03/06/24 1422   BP: 100/82   Pulse: 92   SpO2: 98%        BMI: Body mass index is 47.37 kg/m².     Weight History:   Wt Readings from Last 3 Encounters:   03/06/24 113.7 kg (250 lb 11.2 oz)   02/15/24 114.8 kg (253 lb)   02/13/24 117 kg (258 lb)         HPI:Noa Clark is a 49 y.o. female presenting in third bariatric PRE-OP visit    Diet Recall:    Total weight loss/gain: -3.2 lbs since last visit, and -3.9 lbs since starting program     Protein: tracking calories about 1200 daily  Water Intake: stopped soda completely; water intake good  Exercise: riding bike  and walking treadmill  New health problems: having right knee surgery this month  New medications: denies  Clearances:  -- Cardiology: cleared 2/15/2024  -- Pulmonology: has F/U appt  -- Psychology: appt this month    Thoroughly reviewed the patient's medical history, family history, social history and review of systems with the patient today in the office.  Please see medical record for pertinent positives.      Past Medical History:   Diagnosis Date    Anxiety     Depression     GERD (gastroesophageal reflux disease)     H/O echocardiogram 10/19/2023    Ef 55-60%. Grade I diastolic dysfunction. Normal left ventricular size and Mild-to-moderate pulmonic regurgitation present    Headache     migraines    Hx of exercise stress test 10/24/2023    Exercise tolerance is good. Exercised for 6:00 min on Gomez protocol achieving workload of 7.00 METS.    Hyperlipidemia     Palpitation     PONV (postoperative nausea and vomiting)     Thyroid disease     Vitamin D deficiency         Patient Active Problem List   Diagnosis    TIA (transient ischemic attack)    Paresthesia of tongue    Migraine headache without aura    Complicated migraine

## 2024-03-08 NOTE — PROGRESS NOTES
3/8/24 - JACQUELYN reminding of pre-testing on 3/13/24 @0900.   Bring insurance card, picture ID and a list of your home medications. Check in at the information desk in the lobby upon your arrival. You can eat and take morning medications. Please call with any questions.

## 2024-03-11 ENCOUNTER — NURSE ONLY (OUTPATIENT)
Dept: ORTHOPEDIC SURGERY | Age: 49
End: 2024-03-11

## 2024-03-11 ENCOUNTER — TELEPHONE (OUTPATIENT)
Dept: ORTHOPEDIC SURGERY | Age: 49
End: 2024-03-11

## 2024-03-11 NOTE — PROGRESS NOTES
Joint Replacement Assessment, Right TKA, DOS 3/3/2024    Patient would like to discharge from [facility] with an order for outpatient PT with [facility] in [place]    All questions and concerns of the patient's have been answered at this time to the best of my ability. Patient is aware he may contact this nurse at her direct number given to him at anytime with questions or concerns moving forward.

## 2024-03-11 NOTE — TELEPHONE ENCOUNTER
Please adeemed note for walker      _______________ was evaluated today and a DME order was entered for a wheeled walker because she requires this to successfully complete daily living tasks of toileting, personal cares, and ambulating.  A wheeled walker is necessary due to the patient's unsteady gait, upper body weakness, and inability to  an ambulation device; and she can ambulate only by pushing a walker instead of a lesser assistive device such as a cane, crutch, or standard walker.  The need for this equipment was discussed with the patient and she understands and is in agreement.

## 2024-03-13 ENCOUNTER — HOSPITAL ENCOUNTER (OUTPATIENT)
Dept: PREADMISSION TESTING | Age: 49
Discharge: HOME | End: 2024-03-13
Payer: COMMERCIAL

## 2024-03-13 VITALS
TEMPERATURE: 97 F | RESPIRATION RATE: 18 BRPM | WEIGHT: 249.8 LBS | BODY MASS INDEX: 47.16 KG/M2 | HEIGHT: 61 IN | SYSTOLIC BLOOD PRESSURE: 125 MMHG | DIASTOLIC BLOOD PRESSURE: 68 MMHG | HEART RATE: 91 BPM | OXYGEN SATURATION: 98 %

## 2024-03-13 DIAGNOSIS — Z01.818 PRE-OP TESTING: Primary | ICD-10-CM

## 2024-03-13 LAB
ALBUMIN SERPL-MCNC: 4.6 GM/DL (ref 3.4–5)
ALP BLD-CCNC: 91 IU/L (ref 40–128)
ALT SERPL-CCNC: 21 U/L (ref 10–40)
ANION GAP SERPL CALCULATED.3IONS-SCNC: 10 MMOL/L (ref 7–16)
AST SERPL-CCNC: 25 IU/L (ref 15–37)
BACTERIA: NEGATIVE /HPF
BASOPHILS ABSOLUTE: 0.1 K/CU MM
BASOPHILS RELATIVE PERCENT: 0.9 % (ref 0–1)
BILIRUB SERPL-MCNC: 0.9 MG/DL (ref 0–1)
BILIRUBIN URINE: NEGATIVE MG/DL
BLOOD, URINE: ABNORMAL
BUN SERPL-MCNC: 14 MG/DL (ref 6–23)
CALCIUM SERPL-MCNC: 9.9 MG/DL (ref 8.3–10.6)
CHLORIDE BLD-SCNC: 105 MMOL/L (ref 99–110)
CLARITY: CLEAR
CO2: 29 MMOL/L (ref 21–32)
COLOR: YELLOW
CREAT SERPL-MCNC: 0.7 MG/DL (ref 0.6–1.1)
DIFFERENTIAL TYPE: ABNORMAL
EOSINOPHILS ABSOLUTE: 0.3 K/CU MM
EOSINOPHILS RELATIVE PERCENT: 3.9 % (ref 0–3)
ERYTHROCYTE SEDIMENTATION RATE: 7 MM/HR (ref 0–20)
GFR SERPL CREATININE-BSD FRML MDRD: >60 ML/MIN/1.73M2
GLUCOSE SERPL-MCNC: 107 MG/DL (ref 70–99)
GLUCOSE, URINE: NEGATIVE MG/DL
HCT VFR BLD CALC: 42.9 % (ref 37–47)
HEMOGLOBIN: 13.1 GM/DL (ref 12.5–16)
IMMATURE NEUTROPHIL %: 0.4 % (ref 0–0.43)
KETONES, URINE: NEGATIVE MG/DL
LEUKOCYTE ESTERASE, URINE: ABNORMAL
LYMPHOCYTES ABSOLUTE: 1.6 K/CU MM
LYMPHOCYTES RELATIVE PERCENT: 20.1 % (ref 24–44)
MCH RBC QN AUTO: 28.9 PG (ref 27–31)
MCHC RBC AUTO-ENTMCNC: 30.5 % (ref 32–36)
MCV RBC AUTO: 94.7 FL (ref 78–100)
MONOCYTES ABSOLUTE: 0.4 K/CU MM
MONOCYTES RELATIVE PERCENT: 4.8 % (ref 0–4)
MUCUS: ABNORMAL HPF
NITRITE URINE, QUANTITATIVE: NEGATIVE
NUCLEATED RBC %: 0 %
PDW BLD-RTO: 13.5 % (ref 11.7–14.9)
PH, URINE: 5.5 (ref 5–8)
PLATELET # BLD: 313 K/CU MM (ref 140–440)
PMV BLD AUTO: 11.4 FL (ref 7.5–11.1)
POTASSIUM SERPL-SCNC: 4.6 MMOL/L (ref 3.5–5.1)
PROTEIN UA: NEGATIVE MG/DL
RBC # BLD: 4.53 M/CU MM (ref 4.2–5.4)
RBC URINE: 1 /HPF (ref 0–6)
SEGMENTED NEUTROPHILS ABSOLUTE COUNT: 5.4 K/CU MM
SEGMENTED NEUTROPHILS RELATIVE PERCENT: 69.9 % (ref 36–66)
SODIUM BLD-SCNC: 144 MMOL/L (ref 135–145)
SPECIFIC GRAVITY UA: 1.02 (ref 1–1.03)
SQUAMOUS EPITHELIAL: 7 /HPF
TOTAL IMMATURE NEUTOROPHIL: 0.03 K/CU MM
TOTAL NUCLEATED RBC: 0 K/CU MM
TOTAL PROTEIN: 6.8 GM/DL (ref 6.4–8.2)
TRICHOMONAS: ABNORMAL /HPF
UROBILINOGEN, URINE: 0.2 MG/DL (ref 0.2–1)
WBC # BLD: 7.8 K/CU MM (ref 4–10.5)
WBC UA: 2 /HPF (ref 0–5)

## 2024-03-13 PROCEDURE — 85652 RBC SED RATE AUTOMATED: CPT

## 2024-03-13 PROCEDURE — 87086 URINE CULTURE/COLONY COUNT: CPT

## 2024-03-13 PROCEDURE — 85025 COMPLETE CBC W/AUTO DIFF WBC: CPT

## 2024-03-13 PROCEDURE — 36415 COLL VENOUS BLD VENIPUNCTURE: CPT

## 2024-03-13 PROCEDURE — 80053 COMPREHEN METABOLIC PANEL: CPT

## 2024-03-13 PROCEDURE — 81001 URINALYSIS AUTO W/SCOPE: CPT

## 2024-03-13 PROCEDURE — 87077 CULTURE AEROBIC IDENTIFY: CPT

## 2024-03-13 RX ORDER — SODIUM CHLORIDE, SODIUM LACTATE, POTASSIUM CHLORIDE, CALCIUM CHLORIDE 600; 310; 30; 20 MG/100ML; MG/100ML; MG/100ML; MG/100ML
INJECTION, SOLUTION INTRAVENOUS CONTINUOUS
OUTPATIENT
Start: 2024-03-28

## 2024-03-13 ASSESSMENT — PAIN SCALES - GENERAL: PAINLEVEL_OUTOF10: 6

## 2024-03-13 ASSESSMENT — PAIN - FUNCTIONAL ASSESSMENT: PAIN_FUNCTIONAL_ASSESSMENT: PREVENTS OR INTERFERES SOME ACTIVE ACTIVITIES AND ADLS

## 2024-03-13 ASSESSMENT — PAIN DESCRIPTION - PAIN TYPE: TYPE: ACUTE PAIN

## 2024-03-13 ASSESSMENT — PAIN DESCRIPTION - ORIENTATION: ORIENTATION: RIGHT

## 2024-03-13 ASSESSMENT — PAIN DESCRIPTION - DESCRIPTORS: DESCRIPTORS: ACHING;THROBBING

## 2024-03-13 ASSESSMENT — PAIN DESCRIPTION - LOCATION: LOCATION: KNEE

## 2024-03-13 ASSESSMENT — PAIN DESCRIPTION - FREQUENCY: FREQUENCY: CONTINUOUS

## 2024-03-13 NOTE — PROGRESS NOTES
.Surgery @ [facility] on 3/28/24 you will be called 3/27/24 with times    NOTHING TO EAT OR DRINK AFTER MIDNIGHT DAY OF SURGERY    1. Enter thru the hospital main entrance on day of surgery, check in at the Information Desk. If you arrive prior to 6:00am, enter thru the ER entrance.    2. Follow the directions as prescribed by the doctor for your procedure and medications.         Morning of surgery take:  Amlodipine, Buspar, Levothyroxine with sips of water         Stop vitamins, supplements and NSAIDS: 3/21/25     3. Check with your Doctor regarding stopping blood thinners and follow their instructions.    4. Do not smoke, vape or use chewing tobacco morning of surgery. Do not drink any alcoholic beverages 24 hours prior to surgery.       This includes NA Beer. No street drugs 7 days prior to surgery.    5. If you have dentures, contacts of glasses they will be removed before going to the OR; please bring a case.    6. Please bring picture ID, insurance card, paperwork from the doctor’s office (H & P, Consent, & card for implantable devices).    7. Take a shower with an antibacterial soap the night before surgery and the morning of surgery. Do not put anything on your skin      After your morning shower.    8. You will need a responsible adult to drive you home and check on you after surgery.

## 2024-03-14 LAB
CULTURE: ABNORMAL
CULTURE: ABNORMAL
Lab: ABNORMAL
SPECIMEN: ABNORMAL

## 2024-03-15 ENCOUNTER — TELEPHONE (OUTPATIENT)
Dept: ORTHOPEDIC SURGERY | Age: 49
End: 2024-03-15

## 2024-03-15 NOTE — TELEPHONE ENCOUNTER
I called pt and LMOVM requsting the pt to call our office back about an abnormal lab. Per Dr Isaac Pt needs to see PCP regarding UTI to be prescribed ABX.

## 2024-03-15 NOTE — TELEPHONE ENCOUNTER
Patient left MATTHEW stated that [email or web address] informed her that she needed a new order faxed in for a walker.

## 2024-03-18 ENCOUNTER — ANESTHESIA EVENT (OUTPATIENT)
Dept: OPERATING ROOM | Age: 49
End: 2024-03-18
Payer: COMMERCIAL

## 2024-03-18 DIAGNOSIS — M17.11 PRIMARY OSTEOARTHRITIS OF RIGHT KNEE: Primary | ICD-10-CM

## 2024-03-18 NOTE — ANESTHESIA PRE PROCEDURE
Department of Anesthesiology  Preprocedure Note       Name:  Noa Clark   Age:  49 y.o.  :  1975                                          MRN:  [id number]         Date:  3/18/2024      Surgeon: Surgeon(s):  Randell Isaac DO    Procedure: Procedure(s):  RIGHT KNEE TOTAL ARTHROPLASTY    Medications prior to admission:   Prior to Admission medications    Medication Sig Start Date End Date Taking? Authorizing Provider   buPROPion (WELLBUTRIN) 100 MG tablet Take 1 tablet by mouth 2 times daily 24   Anna Todd MD   tiZANidine (ZANAFLEX) 4 MG tablet Take 1 tablet by mouth daily as needed 24   Anna Todd MD   levothyroxine (SYNTHROID) 25 MCG tablet Take 1 tablet by mouth Daily    Anna Todd MD   losartan (COZAAR) 25 MG tablet Take 1 tablet by mouth daily 23  Emeka Estevez MD   amLODIPine (NORVASC) 5 MG tablet Take 1.5 tablets by mouth daily Taking 1.5 tablets daily 11/2/23 10/27/24  Emeka Estevez MD   atorvastatin (LIPITOR) 80 MG tablet Take 1 tablet by mouth daily 10/6/23   Emeka Estevez MD   QUEtiapine (SEROQUEL) 50 MG tablet  23   Anna Todd MD   aspirin 81 MG EC tablet Take 1 tablet by mouth daily 23   Emeka Estevez MD   nitroGLYCERIN (NITROSTAT) 0.4 MG SL tablet Place 1 tablet under the tongue every 5 minutes as needed for Chest pain 23   Emeka Estevez MD   zolpidem (AMBIEN) 5 MG tablet Take 1 tablet by mouth nightly as needed for Sleep.    Anna Todd MD   busPIRone (BUSPAR) 10 MG tablet Take 1 tablet by mouth in the morning and at bedtime    Anna Todd MD   escitalopram (LEXAPRO) 20 MG tablet Take 1 tablet by mouth daily 3/17/21   Anna Todd MD       Current medications:    No current facility-administered medications for this encounter.     Current Outpatient Medications   Medication Sig Dispense Refill   • buPROPion (WELLBUTRIN) 100 MG tablet Take 1 tablet by mouth 2

## 2024-03-21 RX ORDER — ASPIRIN 81 MG/1
81 TABLET ORAL DAILY
Qty: 90 TABLET | Refills: 3 | Status: SHIPPED | OUTPATIENT
Start: 2024-03-21

## 2024-03-27 NOTE — PROGRESS NOTES
Patient notified of surgery time at [facility] 3/28/2024 0730 arrival 0600, NPO instructions and DOS meds reviewed, understanding verbalized

## 2024-03-28 ENCOUNTER — APPOINTMENT (OUTPATIENT)
Dept: GENERAL RADIOLOGY | Age: 49
End: 2024-03-28
Attending: ORTHOPAEDIC SURGERY
Payer: COMMERCIAL

## 2024-03-28 ENCOUNTER — TELEPHONE (OUTPATIENT)
Dept: ORTHOPEDIC SURGERY | Age: 49
End: 2024-03-28

## 2024-03-28 ENCOUNTER — ANESTHESIA (OUTPATIENT)
Dept: OPERATING ROOM | Age: 49
End: 2024-03-28
Payer: COMMERCIAL

## 2024-03-28 ENCOUNTER — HOSPITAL ENCOUNTER (OUTPATIENT)
Age: 49
Setting detail: OUTPATIENT SURGERY
Discharge: HOME | End: 2024-03-28
Attending: ORTHOPAEDIC SURGERY | Admitting: ORTHOPAEDIC SURGERY
Payer: COMMERCIAL

## 2024-03-28 VITALS
SYSTOLIC BLOOD PRESSURE: 101 MMHG | DIASTOLIC BLOOD PRESSURE: 72 MMHG | RESPIRATION RATE: 18 BRPM | OXYGEN SATURATION: 95 % | HEART RATE: 79 BPM | TEMPERATURE: 97.3 F

## 2024-03-28 DIAGNOSIS — Z96.651 STATUS POST RIGHT KNEE REPLACEMENT: Primary | ICD-10-CM

## 2024-03-28 PROCEDURE — 73560 X-RAY EXAM OF KNEE 1 OR 2: CPT

## 2024-03-28 PROCEDURE — 3700000000 HC ANESTHESIA ATTENDED CARE: Performed by: ORTHOPAEDIC SURGERY

## 2024-03-28 PROCEDURE — A4217 STERILE WATER/SALINE, 500 ML: HCPCS | Performed by: ORTHOPAEDIC SURGERY

## 2024-03-28 PROCEDURE — 6360000002 HC RX W HCPCS: Performed by: ORTHOPAEDIC SURGERY

## 2024-03-28 PROCEDURE — C1776 JOINT DEVICE (IMPLANTABLE): HCPCS | Performed by: ORTHOPAEDIC SURGERY

## 2024-03-28 PROCEDURE — 3600000015 HC SURGERY LEVEL 5 ADDTL 15MIN: Performed by: ORTHOPAEDIC SURGERY

## 2024-03-28 PROCEDURE — 7100000000 HC PACU RECOVERY - FIRST 15 MIN: Performed by: ORTHOPAEDIC SURGERY

## 2024-03-28 PROCEDURE — 6360000002 HC RX W HCPCS: Performed by: ANESTHESIOLOGY

## 2024-03-28 PROCEDURE — 97162 PT EVAL MOD COMPLEX 30 MIN: CPT

## 2024-03-28 PROCEDURE — 7100000010 HC PHASE II RECOVERY - FIRST 15 MIN: Performed by: ORTHOPAEDIC SURGERY

## 2024-03-28 PROCEDURE — 7100000011 HC PHASE II RECOVERY - ADDTL 15 MIN: Performed by: ORTHOPAEDIC SURGERY

## 2024-03-28 PROCEDURE — 6370000000 HC RX 637 (ALT 250 FOR IP): Performed by: ORTHOPAEDIC SURGERY

## 2024-03-28 PROCEDURE — 2720000010 HC SURG SUPPLY STERILE: Performed by: ORTHOPAEDIC SURGERY

## 2024-03-28 PROCEDURE — 2500000003 HC RX 250 WO HCPCS: Performed by: NURSE ANESTHETIST, CERTIFIED REGISTERED

## 2024-03-28 PROCEDURE — 97530 THERAPEUTIC ACTIVITIES: CPT

## 2024-03-28 PROCEDURE — 7100000001 HC PACU RECOVERY - ADDTL 15 MIN: Performed by: ORTHOPAEDIC SURGERY

## 2024-03-28 PROCEDURE — 6360000002 HC RX W HCPCS: Performed by: NURSE ANESTHETIST, CERTIFIED REGISTERED

## 2024-03-28 PROCEDURE — 6370000000 HC RX 637 (ALT 250 FOR IP): Performed by: ANESTHESIOLOGY

## 2024-03-28 PROCEDURE — 2500000003 HC RX 250 WO HCPCS: Performed by: ORTHOPAEDIC SURGERY

## 2024-03-28 PROCEDURE — 3700000001 HC ADD 15 MINUTES (ANESTHESIA): Performed by: ORTHOPAEDIC SURGERY

## 2024-03-28 PROCEDURE — 64447 NJX AA&/STRD FEMORAL NRV IMG: CPT | Performed by: ANESTHESIOLOGY

## 2024-03-28 PROCEDURE — 2709999900 HC NON-CHARGEABLE SUPPLY: Performed by: ORTHOPAEDIC SURGERY

## 2024-03-28 PROCEDURE — 3600000005 HC SURGERY LEVEL 5 BASE: Performed by: ORTHOPAEDIC SURGERY

## 2024-03-28 PROCEDURE — 6370000000 HC RX 637 (ALT 250 FOR IP): Performed by: NURSE ANESTHETIST, CERTIFIED REGISTERED

## 2024-03-28 PROCEDURE — 2580000003 HC RX 258: Performed by: ORTHOPAEDIC SURGERY

## 2024-03-28 RX ORDER — KETAMINE HCL 50MG/ML(1)
SYRINGE (ML) INTRAVENOUS PRN
Status: DISCONTINUED | OUTPATIENT
Start: 2024-03-28 | End: 2024-03-28 | Stop reason: SDUPTHER

## 2024-03-28 RX ORDER — SODIUM CHLORIDE 0.9 % (FLUSH) 0.9 %
5-40 SYRINGE (ML) INJECTION EVERY 12 HOURS SCHEDULED
Status: DISCONTINUED | OUTPATIENT
Start: 2024-03-28 | End: 2024-03-28 | Stop reason: HOSPADM

## 2024-03-28 RX ORDER — ONDANSETRON 2 MG/ML
4 INJECTION INTRAMUSCULAR; INTRAVENOUS
Status: DISCONTINUED | OUTPATIENT
Start: 2024-03-28 | End: 2024-03-28 | Stop reason: HOSPADM

## 2024-03-28 RX ORDER — SODIUM CHLORIDE 0.9 % (FLUSH) 0.9 %
5-40 SYRINGE (ML) INJECTION EVERY 12 HOURS SCHEDULED
Status: CANCELLED | OUTPATIENT
Start: 2024-03-28

## 2024-03-28 RX ORDER — MIDAZOLAM HYDROCHLORIDE 1 MG/ML
INJECTION INTRAMUSCULAR; INTRAVENOUS PRN
Status: DISCONTINUED | OUTPATIENT
Start: 2024-03-28 | End: 2024-03-28 | Stop reason: SDUPTHER

## 2024-03-28 RX ORDER — MIDAZOLAM HYDROCHLORIDE 1 MG/ML
INJECTION INTRAMUSCULAR; INTRAVENOUS
Status: COMPLETED
Start: 2024-03-28 | End: 2024-03-28

## 2024-03-28 RX ORDER — SODIUM CHLORIDE 0.9 % (FLUSH) 0.9 %
5-40 SYRINGE (ML) INJECTION PRN
Status: CANCELLED | OUTPATIENT
Start: 2024-03-28

## 2024-03-28 RX ORDER — TRANEXAMIC ACID 10 MG/ML
1000 INJECTION, SOLUTION INTRAVENOUS
Status: COMPLETED | OUTPATIENT
Start: 2024-03-28 | End: 2024-03-28

## 2024-03-28 RX ORDER — ASPIRIN 81 MG/1
325 TABLET ORAL 2 TIMES DAILY
Status: CANCELLED | OUTPATIENT
Start: 2024-03-28

## 2024-03-28 RX ORDER — HYDRALAZINE HYDROCHLORIDE 20 MG/ML
10 INJECTION INTRAMUSCULAR; INTRAVENOUS
Status: DISCONTINUED | OUTPATIENT
Start: 2024-03-28 | End: 2024-03-28 | Stop reason: HOSPADM

## 2024-03-28 RX ORDER — ROPIVACAINE HYDROCHLORIDE 5 MG/ML
INJECTION, SOLUTION EPIDURAL; INFILTRATION; PERINEURAL
Status: COMPLETED | OUTPATIENT
Start: 2024-03-28 | End: 2024-03-28

## 2024-03-28 RX ORDER — SODIUM CHLORIDE 9 MG/ML
INJECTION, SOLUTION INTRAVENOUS PRN
Status: DISCONTINUED | OUTPATIENT
Start: 2024-03-28 | End: 2024-03-28 | Stop reason: HOSPADM

## 2024-03-28 RX ORDER — CHLOROPROCAINE HYDROCHLORIDE 30 MG/ML
INJECTION, SOLUTION EPIDURAL; INFILTRATION; INTRACAUDAL; PERINEURAL
Status: COMPLETED | OUTPATIENT
Start: 2024-03-28 | End: 2024-03-28

## 2024-03-28 RX ORDER — ONDANSETRON 2 MG/ML
INJECTION INTRAMUSCULAR; INTRAVENOUS PRN
Status: DISCONTINUED | OUTPATIENT
Start: 2024-03-28 | End: 2024-03-28 | Stop reason: SDUPTHER

## 2024-03-28 RX ORDER — SODIUM CHLORIDE, SODIUM LACTATE, POTASSIUM CHLORIDE, CALCIUM CHLORIDE 600; 310; 30; 20 MG/100ML; MG/100ML; MG/100ML; MG/100ML
INJECTION, SOLUTION INTRAVENOUS CONTINUOUS
Status: DISCONTINUED | OUTPATIENT
Start: 2024-03-28 | End: 2024-03-28 | Stop reason: HOSPADM

## 2024-03-28 RX ORDER — ROPIVACAINE HYDROCHLORIDE 5 MG/ML
INJECTION, SOLUTION EPIDURAL; INFILTRATION; PERINEURAL
Status: COMPLETED
Start: 2024-03-28 | End: 2024-03-28

## 2024-03-28 RX ORDER — FENTANYL CITRATE 50 UG/ML
25 INJECTION, SOLUTION INTRAMUSCULAR; INTRAVENOUS EVERY 5 MIN PRN
Status: DISCONTINUED | OUTPATIENT
Start: 2024-03-28 | End: 2024-03-28 | Stop reason: HOSPADM

## 2024-03-28 RX ORDER — DEXAMETHASONE SODIUM PHOSPHATE 4 MG/ML
INJECTION, SOLUTION INTRA-ARTICULAR; INTRALESIONAL; INTRAMUSCULAR; INTRAVENOUS; SOFT TISSUE PRN
Status: DISCONTINUED | OUTPATIENT
Start: 2024-03-28 | End: 2024-03-28 | Stop reason: SDUPTHER

## 2024-03-28 RX ORDER — CEPHALEXIN 250 MG/1
250 CAPSULE ORAL 4 TIMES DAILY
Qty: 4 CAPSULE | Refills: 0 | Status: SHIPPED | OUTPATIENT
Start: 2024-03-28 | End: 2024-03-29

## 2024-03-28 RX ORDER — LIDOCAINE HYDROCHLORIDE 10 MG/ML
INJECTION, SOLUTION EPIDURAL; INFILTRATION; INTRACAUDAL; PERINEURAL
Status: DISCONTINUED
Start: 2024-03-28 | End: 2024-03-28 | Stop reason: HOSPADM

## 2024-03-28 RX ORDER — ASPIRIN 325 MG
325 TABLET ORAL 2 TIMES DAILY
Qty: 28 TABLET | Refills: 0 | Status: SHIPPED | OUTPATIENT
Start: 2024-03-28 | End: 2024-04-11

## 2024-03-28 RX ORDER — OXYCODONE HYDROCHLORIDE AND ACETAMINOPHEN 5; 325 MG/1; MG/1
1 TABLET ORAL EVERY 6 HOURS PRN
Qty: 28 TABLET | Refills: 0 | Status: SHIPPED | OUTPATIENT
Start: 2024-03-28 | End: 2024-03-29 | Stop reason: ALTCHOICE

## 2024-03-28 RX ORDER — SODIUM CHLORIDE 0.9 % (FLUSH) 0.9 %
5-40 SYRINGE (ML) INJECTION PRN
Status: DISCONTINUED | OUTPATIENT
Start: 2024-03-28 | End: 2024-03-28 | Stop reason: HOSPADM

## 2024-03-28 RX ORDER — PROPOFOL 10 MG/ML
INJECTION, EMULSION INTRAVENOUS CONTINUOUS PRN
Status: DISCONTINUED | OUTPATIENT
Start: 2024-03-28 | End: 2024-03-28 | Stop reason: SDUPTHER

## 2024-03-28 RX ORDER — TIZANIDINE 4 MG/1
4 TABLET ORAL EVERY 8 HOURS PRN
Qty: 30 TABLET | Refills: 0 | Status: SHIPPED | OUTPATIENT
Start: 2024-03-28

## 2024-03-28 RX ORDER — FENTANYL CITRATE 50 UG/ML
50 INJECTION, SOLUTION INTRAMUSCULAR; INTRAVENOUS EVERY 5 MIN PRN
Status: COMPLETED | OUTPATIENT
Start: 2024-03-28 | End: 2024-03-28

## 2024-03-28 RX ORDER — OXYCODONE HYDROCHLORIDE 5 MG/1
5 TABLET ORAL EVERY 4 HOURS PRN
Status: CANCELLED | OUTPATIENT
Start: 2024-03-28

## 2024-03-28 RX ORDER — OXYCODONE HYDROCHLORIDE AND ACETAMINOPHEN 5; 325 MG/1; MG/1
1 TABLET ORAL ONCE
Status: COMPLETED | OUTPATIENT
Start: 2024-03-28 | End: 2024-03-28

## 2024-03-28 RX ORDER — GLYCOPYRROLATE 0.2 MG/ML
INJECTION INTRAMUSCULAR; INTRAVENOUS PRN
Status: DISCONTINUED | OUTPATIENT
Start: 2024-03-28 | End: 2024-03-28 | Stop reason: SDUPTHER

## 2024-03-28 RX ORDER — SODIUM CHLORIDE 9 MG/ML
INJECTION, SOLUTION INTRAVENOUS PRN
Status: CANCELLED | OUTPATIENT
Start: 2024-03-28

## 2024-03-28 RX ORDER — LIDOCAINE HYDROCHLORIDE 20 MG/ML
INJECTION, SOLUTION INTRAVENOUS PRN
Status: DISCONTINUED | OUTPATIENT
Start: 2024-03-28 | End: 2024-03-28 | Stop reason: SDUPTHER

## 2024-03-28 RX ORDER — OXYCODONE HYDROCHLORIDE 5 MG/1
10 TABLET ORAL EVERY 4 HOURS PRN
Status: CANCELLED | OUTPATIENT
Start: 2024-03-28

## 2024-03-28 RX ORDER — ACETAMINOPHEN 325 MG/1
650 TABLET ORAL EVERY 6 HOURS
Status: CANCELLED | OUTPATIENT
Start: 2024-03-28

## 2024-03-28 RX ORDER — KETOROLAC TROMETHAMINE 30 MG/ML
15 INJECTION, SOLUTION INTRAMUSCULAR; INTRAVENOUS EVERY 6 HOURS PRN
Status: DISCONTINUED | OUTPATIENT
Start: 2024-03-28 | End: 2024-03-28 | Stop reason: HOSPADM

## 2024-03-28 RX ORDER — LABETALOL HYDROCHLORIDE 5 MG/ML
10 INJECTION, SOLUTION INTRAVENOUS
Status: DISCONTINUED | OUTPATIENT
Start: 2024-03-28 | End: 2024-03-28 | Stop reason: HOSPADM

## 2024-03-28 RX ORDER — ACETAMINOPHEN 500 MG
1000 TABLET ORAL ONCE
Status: COMPLETED | OUTPATIENT
Start: 2024-03-28 | End: 2024-03-28

## 2024-03-28 RX ORDER — SCOLOPAMINE TRANSDERMAL SYSTEM 1 MG/1
PATCH, EXTENDED RELEASE TRANSDERMAL PRN
Status: DISCONTINUED | OUTPATIENT
Start: 2024-03-28 | End: 2024-03-28 | Stop reason: SDUPTHER

## 2024-03-28 RX ORDER — NALOXONE HYDROCHLORIDE 0.4 MG/ML
INJECTION, SOLUTION INTRAMUSCULAR; INTRAVENOUS; SUBCUTANEOUS PRN
Status: DISCONTINUED | OUTPATIENT
Start: 2024-03-28 | End: 2024-03-28 | Stop reason: HOSPADM

## 2024-03-28 RX ADMIN — ONDANSETRON 4 MG: 2 INJECTION INTRAMUSCULAR; INTRAVENOUS at 08:02

## 2024-03-28 RX ADMIN — Medication 25 MG: at 07:55

## 2024-03-28 RX ADMIN — MIDAZOLAM 2 MG: 1 INJECTION INTRAMUSCULAR; INTRAVENOUS at 07:25

## 2024-03-28 RX ADMIN — ACETAMINOPHEN 1000 MG: 500 TABLET ORAL at 06:34

## 2024-03-28 RX ADMIN — CHLOROPROCAINE HYDROCHLORIDE 3 ML: 30 INJECTION, SOLUTION EPIDURAL; INFILTRATION; INTRACAUDAL; PERINEURAL at 07:45

## 2024-03-28 RX ADMIN — FENTANYL CITRATE 50 MCG: 50 INJECTION, SOLUTION INTRAMUSCULAR; INTRAVENOUS at 09:34

## 2024-03-28 RX ADMIN — PROPOFOL 150 MCG/KG/MIN: 10 INJECTION, EMULSION INTRAVENOUS at 07:55

## 2024-03-28 RX ADMIN — MIDAZOLAM 2 MG: 1 INJECTION INTRAMUSCULAR; INTRAVENOUS at 07:47

## 2024-03-28 RX ADMIN — CEFAZOLIN 3000 MG: 2 INJECTION, POWDER, FOR SOLUTION INTRAMUSCULAR; INTRAVENOUS at 07:55

## 2024-03-28 RX ADMIN — FENTANYL CITRATE 50 MCG: 50 INJECTION, SOLUTION INTRAMUSCULAR; INTRAVENOUS at 09:51

## 2024-03-28 RX ADMIN — KETOROLAC TROMETHAMINE 15 MG: 30 INJECTION, SOLUTION INTRAMUSCULAR; INTRAVENOUS at 09:40

## 2024-03-28 RX ADMIN — SCOLOPAMINE TRANSDERMAL SYSTEM 1 PATCH: 1 PATCH, EXTENDED RELEASE TRANSDERMAL at 07:55

## 2024-03-28 RX ADMIN — LIDOCAINE HYDROCHLORIDE 50 MG: 20 INJECTION, SOLUTION INTRAVENOUS at 07:55

## 2024-03-28 RX ADMIN — TRANEXAMIC ACID 1000 MG: 10 INJECTION, SOLUTION INTRAVENOUS at 07:58

## 2024-03-28 RX ADMIN — DEXAMETHASONE SODIUM PHOSPHATE 8 MG: 4 INJECTION, SOLUTION INTRAMUSCULAR; INTRAVENOUS at 08:02

## 2024-03-28 RX ADMIN — Medication 25 MG: at 08:02

## 2024-03-28 RX ADMIN — OXYCODONE AND ACETAMINOPHEN 1 TABLET: 5; 325 TABLET ORAL at 10:39

## 2024-03-28 RX ADMIN — SODIUM CHLORIDE, POTASSIUM CHLORIDE, SODIUM LACTATE AND CALCIUM CHLORIDE: 600; 310; 30; 20 INJECTION, SOLUTION INTRAVENOUS at 06:40

## 2024-03-28 RX ADMIN — GLYCOPYRROLATE 0.2 MG: 0.2 INJECTION, SOLUTION INTRAMUSCULAR; INTRAVENOUS at 08:02

## 2024-03-28 RX ADMIN — FENTANYL CITRATE 50 MCG: 50 INJECTION, SOLUTION INTRAMUSCULAR; INTRAVENOUS at 09:56

## 2024-03-28 RX ADMIN — FENTANYL CITRATE 50 MCG: 50 INJECTION, SOLUTION INTRAMUSCULAR; INTRAVENOUS at 09:25

## 2024-03-28 RX ADMIN — ROPIVACAINE HYDROCHLORIDE 20 ML: 5 INJECTION, SOLUTION EPIDURAL; INFILTRATION; PERINEURAL at 07:25

## 2024-03-28 ASSESSMENT — PAIN DESCRIPTION - DESCRIPTORS
DESCRIPTORS: BURNING;SHARP
DESCRIPTORS: BURNING
DESCRIPTORS: BURNING
DESCRIPTORS: BURNING;SHARP
DESCRIPTORS: BURNING
DESCRIPTORS: BURNING;SHARP
DESCRIPTORS: BURNING

## 2024-03-28 ASSESSMENT — PAIN DESCRIPTION - PAIN TYPE
TYPE: SURGICAL PAIN

## 2024-03-28 ASSESSMENT — PAIN - FUNCTIONAL ASSESSMENT
PAIN_FUNCTIONAL_ASSESSMENT: PREVENTS OR INTERFERES WITH ALL ACTIVE AND SOME PASSIVE ACTIVITIES
PAIN_FUNCTIONAL_ASSESSMENT: PREVENTS OR INTERFERES SOME ACTIVE ACTIVITIES AND ADLS
PAIN_FUNCTIONAL_ASSESSMENT: 0-10
PAIN_FUNCTIONAL_ASSESSMENT: PREVENTS OR INTERFERES SOME ACTIVE ACTIVITIES AND ADLS

## 2024-03-28 ASSESSMENT — PAIN SCALES - GENERAL
PAINLEVEL_OUTOF10: 4
PAINLEVEL_OUTOF10: 7
PAINLEVEL_OUTOF10: 5
PAINLEVEL_OUTOF10: 7
PAINLEVEL_OUTOF10: 7
PAINLEVEL_OUTOF10: 10
PAINLEVEL_OUTOF10: 9
PAINLEVEL_OUTOF10: 7

## 2024-03-28 ASSESSMENT — PAIN DESCRIPTION - FREQUENCY
FREQUENCY: CONTINUOUS

## 2024-03-28 ASSESSMENT — PAIN DESCRIPTION - ONSET
ONSET: ON-GOING

## 2024-03-28 ASSESSMENT — PAIN DESCRIPTION - LOCATION
LOCATION: KNEE

## 2024-03-28 ASSESSMENT — PAIN DESCRIPTION - ORIENTATION
ORIENTATION: RIGHT

## 2024-03-28 NOTE — TELEPHONE ENCOUNTER
Patients daughter called and requested a shower chair, and a toilet extender for the patient. Please advise patient at [phone]

## 2024-03-28 NOTE — PROGRESS NOTES
1017 Pt. Brought back to unit bedside report received from Royal Flores. Pt. Is A&O, VSS, pt.   1033 Dr. Isaac at bedside.   1039 Pt. Given Percocet for pain. Tolerated well.   1110 Pt. Tolerated sitting at bedside / stood at bedside.   1115 Called and left VM for therapy to come see patient.   1120 PT to come down to see patient.   1143 Pt. Remains sitting on SSB, pt. Prefers to sit this way. Family at bedside, call light in reach.   1210 Pt. Working with PT.    DC instructions with pt and family, all parties express understanding.   Pt. Getting dressed. Pt. To DC home with spouse.

## 2024-03-28 NOTE — BRIEF OP NOTE
Brief Postoperative Note      Patient: Noa Clark  YOB: 1975  MRN: [id number]    Date of Procedure: 3/28/2024    Pre-Op Diagnosis Codes:     * Primary osteoarthritis of right knee [M17.11]     * Chronic pain of right knee [M25.561, G89.29]    Post-Op Diagnosis: Same       Procedure(s):  RIGHT KNEE TOTAL ARTHROPLASTY    Surgeon(s):  Randell Martinez DO    Assistant:  * No surgical staff found *    Anesthesia: Spinal    Estimated Blood Loss (mL): 250 mL    Complications: None    Specimens:   * No specimens in log *    Implants:  Implant Name Type Inv. Item Serial No.  Lot No. LRB No. Used Action   PSN MC VE ASF R 11MM 6-7/EF - [id number]  PSN MC VE ASF R 11MM 6-7/EF  [employer]- [id number] Right 1 Implanted   IMPL KNEE PSN FEM CR POR CCR STD SZ7 R - [id number] Knee IMPL KNEE PSN FEM CR POR CCR STD SZ7 R  [employer] [id number] Right 1 Implanted   COMPONENT PAT [id number] STD TI POLY TRABECULAR MTL - [id number]  COMPONENT PAT [id number] STD TI POLY TRABECULAR MTL  [employer]- [id number] Right 1 Implanted   PSN TIB POR 2 PEG SZ E R - [id number]  PSN TIB POR 2 PEG SZ E R  [employer]- [id number] Right 1 Implanted         Drains: * No LDAs found *    Findings: R knee OA      Electronically signed by RANDELL MARTINEZ DO on 3/28/2024 at 8:47 AM

## 2024-03-28 NOTE — ANESTHESIA POSTPROCEDURE EVALUATION
Department of Anesthesiology  Postprocedure Note    Patient: Noa Clark  MRN: [id number]  YOB: 1975  Date of evaluation: 3/28/2024    Procedure Summary       Date: 03/28/24 Room / Location: [address]    Anesthesia Start: 0742 Anesthesia Stop: 0924    Procedure: RIGHT KNEE TOTAL ARTHROPLASTY (Right: Knee) Diagnosis:       Primary osteoarthritis of right knee      Chronic pain of right knee      (Primary osteoarthritis of right knee [M17.11])      (Chronic pain of right knee [M25.561, G89.29])    Surgeons: Randell Isaac DO Responsible Provider: Ugo Parker MD    Anesthesia Type: general, regional, MAC, spinal ASA Status: 3            Anesthesia Type: No value filed.    Reyes Phase I: Reyes Score: 9    Reyes Phase II: Reyes Score: 10    Anesthesia Post Evaluation    Patient location during evaluation: PACU  Patient participation: complete - patient participated  Level of consciousness: sleepy but conscious  Pain score: 2  Airway patency: patent  Nausea & Vomiting: no nausea and no vomiting  Cardiovascular status: hemodynamically stable  Respiratory status: acceptable  Hydration status: euvolemic  Pain management: adequate    No notable events documented.

## 2024-03-28 NOTE — H&P
to spend the night in the hospital after surgery and would likely be a press-fit candidate.  We we will schedule this for about 2 weeks after Ovidio's second hip replacement.         Noa Clark was evaluated today and a DME order was entered for a wheeled walker because she requires this to successfully complete daily living tasks of toileting, personal cares, and ambulating.  A wheeled walker is necessary due to the patient's unsteady gait, upper body weakness, and inability to  an ambulation device; and she can ambulate only by pushing a walker instead of a lesser assistive device such as a cane, crutch, or standard walker.  The need for this equipment was discussed with the patient and she understands and is in agreement.       Pt seen and examined, No change in H+P.                 TORIE MARTINEZ, DO

## 2024-03-28 NOTE — OP NOTE
DATE OF PROCEDURE:  3/28/2024    PREOPERATIVE DIAGNOSIS:  Right knee DJD.    POSTOPERATIVE DIAGNOSIS:  Right knee DJD.    PROCEDURE:  Right total knee arthroplasty using [employer] Press-fit MC stabilized knee with size 7 femoral component, size E tibial component, size 32 patellar component and size 11 tibial poly component.    SURGEON:  Randell Isaac DO    ANESTHESIA:  Spinal with regional block.    ESTIMATED BLOOD LOSS:  250 mL.    TOTAL TOURNIQUET TIME:  25 minutes.    FLUIDS:  1000 mL of crystalloids.    INDICATIONS FOR PROCEDURE:  The patient is a 49-year-old female with  long-standing history of right knee pain.  For this, she underwent  conservative treatment with no relief of her symptoms.  X-rays revealed  severe arthritis in the right knee.  Given her persistent symptoms  despite conservative treatment and with her x-ray findings, I  recommended surgical treatment.  I explained the risks, benefits and  possible complications of the procedure to the patient and after  answering all of her questions, she consented to undergo the above  procedure.    REPORT OF PROCEDURE:  The patient was seen and evaluated in the  preoperative holding area where the right lower extremity was signed in  her presence.  At this point, care of the patient was turned over to  anesthesia team who performed a regional block to the right lower  extremity.  She was then transported back to the operative suite.  Spinal  anesthesia was performed and once adequate anesthesia was obtained, the  right lower extremity was prepped and draped in usual sterile fashion.   Preoperative antibiotics were administered.   1 gram of TXA was administered IV. At this point, a time-out was performed and all in attendance were  in agreement.    I exsanguinated the right lower extremity with the use of an Esmarch and  tourniquet was inflated to 280 mmHg.  I then made a standard anterior  midline incision overlying the right knee and carried

## 2024-03-28 NOTE — DISCHARGE INSTRUCTIONS
[facility]  [phone]    Do not drive, work around machines or use equipment.  Do not drink any alcoholic beverages.  Do not smoke while alone.  Avoid making important decisions.  Plan to spend a quiet, relaxed evening @ home.  Resume normal activities as you begin to feel better.  Eat lightly for your first meal, then gradually increase your diet to what is normal for you.  In case of nausea, avoid food and drink only clear liquids.  Resume food as nausea ceases.  Notify your surgeon if you experience fever, chills, large amount of bleeding, difficulty breathing, persistent nausea and vomiting or any other disturbing problem.  Call for a follow-up appointment with your surgeon.   
independent

## 2024-03-28 NOTE — ANESTHESIA PROCEDURE NOTES
Spinal Block    Patient location during procedure: OR  End time: 3/28/2024 7:55 AM  Reason for block: primary anesthetic  Staffing  Performed: resident/CRNA   Anesthesiologist: Ugo Parker MD  Resident/CRNA: Sissy Isaac APRN - CRNA  Performed by: Sissy Isaac APRN - CRNA  Authorized by: Ugo Parker MD    Spinal Block  Patient position: sitting  Prep: Betadine  Patient monitoring: cardiac monitor, continuous pulse ox and frequent blood pressure checks  Approach: midline  Location: L3/L4  Guidance: paresthesia technique  Provider prep: sterile gloves and mask  Local infiltration: lidocaine  Needle  Needle type: Quincke   Needle gauge: 21 G  Needle length: 3.5 in  Assessment  Sensory level: T8  Swirl obtained: Yes  CSF: clear  Attempts: 2  Hemodynamics: stable  Preanesthetic Checklist  Completed: patient identified, IV checked, site marked, risks and benefits discussed, surgical/procedural consents, equipment checked, pre-op evaluation, timeout performed, anesthesia consent given, oxygen available, monitors applied/VS acknowledged, fire risk safety assessment completed and verbalized and blood product R/B/A discussed and consented

## 2024-03-28 NOTE — ANESTHESIA PROCEDURE NOTES
Peripheral Block    Patient location during procedure: procedure area  Reason for block: post-op pain management and at surgeon's request  Start time: 3/28/2024 7:25 AM  End time: 3/28/2024 7:30 AM  Staffing  Performed: resident/CRNA   Anesthesiologist: Ugo Parker MD  Resident/CRNA: Sissy Isaac APRN - CRNA  Performed by: Sissy Isaac APRN - CRNA  Authorized by: Ugo Parker MD    Preanesthetic Checklist  Completed: patient identified, IV checked, site marked, risks and benefits discussed, surgical/procedural consents, equipment checked, pre-op evaluation, timeout performed, anesthesia consent given, oxygen available, monitors applied/VS acknowledged, fire risk safety assessment completed and verbalized and blood product R/B/A discussed and consented  Peripheral Block   Patient position: supine  Prep: ChloraPrep  Provider prep: sterile gloves and mask  Patient monitoring: cardiac monitor, continuous pulse ox, continuous capnometry, IV access and responsive to questions  Block type: Femoral  Adductor canal  Laterality: right  Injection technique: single-shot  Guidance: ultrasound guided  Local infiltration: lidocaine  Infiltration strength: 2 %  Local infiltration: lidocaine  Dose: 2 mL    Needle   Needle type: insulated echogenic nerve stimulator needle   Needle gauge: 22 G  Needle localization: ultrasound guidance  Needle length: 10 cm  Assessment   Injection assessment: negative aspiration for heme, no paresthesia on injection, local visualized surrounding nerve on ultrasound and no intravascular symptoms  Paresthesia pain: none  Slow fractionated injection: yes  Hemodynamics: stable  Outcomes: uncomplicated and patient tolerated procedure well    Medications Administered  ropivacaine (NAROPIN) injection 0.5% - Perineural   20 mL - 3/28/2024 7:25:00 AM

## 2024-03-28 NOTE — TELEPHONE ENCOUNTER
Jason called in asking for the script to be sent to  [facility]    . [phone]    Please drop these notes into op note.    Noa Clark requires the assistance of a tub transfer bench to successfully and safely complete bathing activities necessary due to reduced balance, endurance, need for ADL assist, and LE weakness and reduced ROM. These tasks cannot be safely completed without this device and would place Noa Clark at greater risk of falls.        Noa Clark requires the assistance of a tub transfer bench to successfully and safely complete bathing activities necessary due to right total knee arthroplasty. These tasks cannot be safely completed without this device and would place Noa Clark at greater risk of falls.

## 2024-03-28 NOTE — PROGRESS NOTES
0919 pt arrived from OR to PACU. Monitors applied and alarms in place. Report rec'd from Sissy ARGUETA. Pt alert and oriented able to move BLE and wiggle toes  0925 Pt medicated for pain  0934 pt re-medicated for pain  0945 xray at bedside  0951 Pt medicated for pain  0956 Pt remedicated for pain  1000 pt able to move all extremities. Pt alert and oriented. Resp even and unlabored on NC.   1010 pt transported to [facility] by this RN. Report given to Thais JAIN

## 2024-03-28 NOTE — CONSULTS
[facility] ACUTE CARE PHYSICAL THERAPY EVALUATION  Noa Clark, 1975, OR/NONE, 3/28/2024    History  [place]:  The encounter diagnosis was Status post right knee replacement.  Patient  has a past medical history of Anxiety, Depression, GERD (gastroesophageal reflux disease), H/O echocardiogram, Headache, Hx of exercise stress test, Hyperlipidemia, Osteoarthritis of right knee, Palpitation, PONV (postoperative nausea and vomiting), Thyroid disease, and Vitamin D deficiency.  Patient  has a past surgical history that includes Thyroidectomy, partial; Tympanostomy tube placement; Breast reduction surgery (Bilateral, 2007); Hysterectomy (2007); and Knee arthroscopy (Right, 5/24/2022).    Discharge Recommendation:  Recommend home with assist, including continual support/supervision, physical assist for home entry/exit, and home health PT/OT service.    Subjective:    Patient states:  \"I'm going to need an elevated toilet seat\".      Pain:  pt reports RLE pain, rated 6/10.      Communication with other providers:  Handoff to RN.    Restrictions: general precautions, fall risk, RLE WBAT     Home Setup/Prior level of function  Social/Functional History  Lives With: Spouse, Daughter  Type of Home: House  Home Layout: One level  Home Access: Stairs to enter with rails  Entrance Stairs - Number of Steps: 2  Entrance Stairs - Rails: Right  Bathroom Shower/Tub: Tub/Shower unit  Bathroom Toilet: Standard  Bathroom Equipment: Grab bars in shower  Home Equipment: Walker, rolling  Has the patient had two or more falls in the past year or any fall with injury in the past year?: No  Receives Help From: Family  ADL Assistance: Independent  Homemaking Assistance: Independent  Ambulation Assistance: Independent  Transfer Assistance: Independent  Active : Yes  Occupation: Retired    Examination of body systems (includes body structures/functions, activity/participation limitations):  Observation:  pt found resting in

## 2024-03-29 ENCOUNTER — TELEPHONE (OUTPATIENT)
Dept: ORTHOPEDIC SURGERY | Age: 49
End: 2024-03-29

## 2024-03-29 DIAGNOSIS — Z96.651 S/P TOTAL KNEE ARTHROPLASTY, RIGHT: Primary | ICD-10-CM

## 2024-03-29 RX ORDER — DIAZEPAM 5 MG/1
5 TABLET ORAL EVERY 8 HOURS PRN
Qty: 20 TABLET | Refills: 0 | Status: SHIPPED | OUTPATIENT
Start: 2024-03-29 | End: 2024-04-05

## 2024-03-29 RX ORDER — OXYCODONE AND ACETAMINOPHEN 7.5; 325 MG/1; MG/1
1 TABLET ORAL EVERY 6 HOURS PRN
Qty: 28 TABLET | Refills: 0 | Status: SHIPPED | OUTPATIENT
Start: 2024-03-29 | End: 2024-04-05

## 2024-03-29 NOTE — TELEPHONE ENCOUNTER
Patient called in stating that PKATARINA told her she should do in home therapy for the first two weeks, so she is requesting an order for that. She is also following up from her call on Thursday, asking if she could have an order for a shower chair since she has to step up into her shower and also an order for a raised toilet seat, she states her toilet sit low to the ground and she is having difficulty with it.  I did inform her that typically our surgeons' prefer the patient to move around as much as they can without the assistance of medical equipment.

## 2024-03-29 NOTE — TELEPHONE ENCOUNTER
Patient called in and stated her incision is saturating through the bandages. I advised patient to come in to the [place] office so Tony could take a look at it. Patient then stated that the current pain medication prescribed for her, is not touching the pain. I advised patient that we typically don't prescribe anything stronger that what she is currently given, and if the pain is that severe, she should go to the ED.

## 2024-04-01 ENCOUNTER — CLINICAL DOCUMENTATION (OUTPATIENT)
Dept: ORTHOPEDIC SURGERY | Age: 49
End: 2024-04-01

## 2024-04-01 DIAGNOSIS — Z96.651 S/P TOTAL KNEE ARTHROPLASTY, RIGHT: Primary | ICD-10-CM

## 2024-04-01 NOTE — PROGRESS NOTES
Noa Clark requires the assistance of a tub transfer bench to successfully and safely complete bathing activities necessary due to reduced balance, endurance, need for ADL assist, and LE weakness and reduced ROM. These tasks cannot be safely completed without this device and would place Noa Clark at greater risk of falls.           Noa Clark requires the assistance of a tub transfer bench to successfully and safely complete bathing activities necessary due to right total knee arthroplasty. These tasks cannot be safely completed without this device and would place Noa Clark at greater risk of falls.

## 2024-04-01 NOTE — TELEPHONE ENCOUNTER
Please add the following to or create note with:     Please drop these notes into op note.     Noa Clark requires the assistance of a tub transfer bench to successfully and safely complete bathing activities necessary due to reduced balance, endurance, need for ADL assist, and LE weakness and reduced ROM. These tasks cannot be safely completed without this device and would place oNa Clark at greater risk of falls.           Noa Clark requires the assistance of a tub transfer bench to successfully and safely complete bathing activities necessary due to right total knee arthroplasty. These tasks cannot be safely completed without this device and would place Noa Clark at greater risk of falls.

## 2024-04-02 ENCOUNTER — HOSPITAL ENCOUNTER (OUTPATIENT)
Dept: PHYSICAL THERAPY | Age: 49
Setting detail: THERAPIES SERIES
Discharge: HOME | End: 2024-04-02
Payer: COMMERCIAL

## 2024-04-02 PROCEDURE — 97110 THERAPEUTIC EXERCISES: CPT

## 2024-04-02 PROCEDURE — 97164 PT RE-EVAL EST PLAN CARE: CPT

## 2024-04-02 ASSESSMENT — PAIN SCALES - GENERAL: PAINLEVEL_OUTOF10: 7

## 2024-04-02 ASSESSMENT — PAIN DESCRIPTION - ORIENTATION: ORIENTATION: LEFT

## 2024-04-02 ASSESSMENT — PAIN DESCRIPTION - PAIN TYPE: TYPE: SURGICAL PAIN

## 2024-04-02 ASSESSMENT — PAIN DESCRIPTION - LOCATION: LOCATION: KNEE

## 2024-04-02 NOTE — PLAN OF CARE
Outpatient Physical Therapy           [place]           [] Phone: [phone]   Fax: [phone]  [place]           [x] Phone: [phone]   Fax: [phone]     To: Randell Isaac DO    From: ZACKERY Mullins PT,    Patient: Noa Clark       : 1975  Diagnosis: Primary osteoarthritis of right knee [M17.11]  Chronic pain of right knee [M25.561, G89.29] Diagnosis: R TKA 3/28/24  Treatment Diagnosis:    Date: 2024      The following patient has been evaluated for physical therapy services and for therapy to continue, insurance requires physician review of the treatment plan initially and every 90 days. Please review the attached evaluation and/or summary of the patient's plan of care, and verify that you agree therapy should continue by signing the attached document and sending it back to our office.    Assessment:     TUG 19 sec with RW; LEFS 3/80    Plan of Care/Treatment to date:  [x] Therapeutic Exercise  [] Modalities:  [x] Therapeutic Activity     [] Ultrasound  [] Electrical Stimulation  [x] Gait Training      [] Cervical Traction [] Lumbar Traction  [x] Neuromuscular Re-education    [] Cold/hotpack [] Iontophoresis   [x] Instruction in HEP      [] Vasopneumatic    [] Dry Needling  [x] Manual Therapy               [] Aquatic Therapy       Other:          Frequency/Duration:  # Days per week: [] 1 day # Weeks: [] 1 week [] 5 weeks     [x] 2 days   [] 2 weeks [x] 6 weeks     [] 3 days   [] 3 weeks [] 7 weeks     [] 4 days   [] 4 weeks [] 8 weeks         [] 9 weeks [] 10 weeks         [] 11 weeks [] 12 weeks    Rehab Potential/Progress: [] Excellent [x] Good [] Fair  [] Poor     Goals:    Patient goals: get back to walking without suppport   Patient will I SLR x 5 reps with mod Lag in 3 weeks  Long Term Goals  Time Frame for Long Term Goals:  6 weeks   Patient will complete TUG in 12 sec with cane   Patient will score 20/80 on LEFS   Patient will walk 225 ft in 2 MWT using cane

## 2024-04-02 NOTE — PROGRESS NOTES
Physical Therapy: Initial Evaluation    Patient: Noa Clark (49 y.o. female)   Examination Date: 2024  Plan of Care Certification Period: 2024 to        :  1975 ;    Confirmed: Yes MRN: [id number]  CSN: [id number]   Insurance: Payor: [facility] / Plan: [facility] MEDICAID / Product Type: *No Product type* /   Insurance ID: [id number] - (Medicaid Managed) Secondary Insurance (if applicable):    Referring Physician: Randell Isaac DO [place]   PCP: Tesha Biswas MD Visits to Date/Visits Approved:   /      No Show/Cancelled Appts:   /       Medical Diagnosis: Primary osteoarthritis of right knee [M17.11]  Chronic pain of right knee [M25.561, G89.29] R TKA 3/28/24  Treatment Diagnosis:       PERTINENT MEDICAL HISTORY           Medical History:     Past Medical History:   Diagnosis Date    Anxiety     Depression     GERD (gastroesophageal reflux disease)     H/O echocardiogram 10/19/2023    Ef 55-60%. Grade I diastolic dysfunction. Normal left ventricular size and Mild-to-moderate pulmonic regurgitation present    Headache     migraines    Hx of exercise stress test 10/24/2023    Exercise tolerance is good. Exercised for 6:00 min on Gomez protocol achieving workload of 7.00 METS.    Hyperlipidemia     Osteoarthritis of right knee     Palpitation     PONV (postoperative nausea and vomiting)     Thyroid disease     Vitamin D deficiency      Surgical History:   Past Surgical History:   Procedure Laterality Date    BREAST REDUCTION SURGERY Bilateral     HYSTERECTOMY (CERVIX STATUS UNKNOWN)  2007    KNEE ARTHROSCOPY Right 2022    RIGHT KNEE ARTHROSCOPY WITH CHONDROPLASTY performed by Randell Isaac DO at [facility] OR    THYROIDECTOMY, PARTIAL          TOTAL KNEE ARTHROPLASTY Right 3/28/2024    RIGHT KNEE TOTAL ARTHROPLASTY performed by Randell Isaac DO at [facility] OR    TYMPANOSTOMY TUBE PLACEMENT      As a child up till        Medications:   Current Outpatient Medications:

## 2024-04-02 NOTE — FLOWSHEET NOTE
Outpatient Physical Therapy  [place]           [x] Phone: [phone]   Fax: [phone]  [place]           [] Phone: [phone]   Fax: [phone]        Physical Therapy Daily Treatment Note  Date:  2024    Patient Name:  Noa Clark    :  1975  MRN: [id number]  Restrictions/Precautions: No data recorded      Diagnosis:   Primary osteoarthritis of right knee [M17.11]  Chronic pain of right knee [M25.561, G89.29] Diagnosis: R TKA 3/28/24  Date of Injury/Surgery:   Treatment Diagnosis:     Insurance/Certification information: Pre cert  Referring Physician:  Randell Isaac DO    PCP: Tesha Biswas MD  Next Doctor Visit:    Plan of care signed (Y/N):    Outcome Measure:   Visit# / total visits:  1 /10  Pain level: 7/10   Goals:     Patient goals: get back to walking without suppport   Patient will I SLR x 5 reps with mod Lag in 3 weeks  Long Term Goals  Time Frame for Long Term Goals:  6 weeks   Patient will complete TUG in 12 sec with cane   Patient will score 20/80 on LEFS   Patient will walk 225 ft in 2 MWT using cane               Summary of Evaluation:           Subjective:  See eval         Any changes in Ambulatory Summary Sheet?  None        Objective:  See eval           Exercises: (No more than 4 columns)   Exercise/Equipment Date 24 Date Date           WARM UP         nustep S8 A11 L3 x10'           TABLE      Seated dangle X10 reps     SAQ Med roll 5 ct x10 reps     Supine ABD/ADD AROM X10 reps     Heel prop 1/2 roll x 3'              STANDING      Slant board x1'                                              PROPRIOCEPTION                                    MODALITIES                      Other Therapeutic Activities/Education:        Home Exercise Program:        Manual Treatments:        Modalities:        Communication with other providers:        Assessment:  (Response towards treatment session) (Pain Rating)   Pt tolerated  treatment without any adverse reactions

## 2024-04-04 DIAGNOSIS — G89.18 POST-OPERATIVE PAIN: Primary | ICD-10-CM

## 2024-04-04 NOTE — TELEPHONE ENCOUNTER
Spoke with pt, stated she thinks the tub/shower transfer bench is sufficient as of now. DME order created and faxed with Dr. Isaac's progress note to [facility].

## 2024-04-04 NOTE — TELEPHONE ENCOUNTER
Pt's  Ovidio left  asking if we have completed the DME orders for the patient yet.    Please advise # [phone]

## 2024-04-05 ENCOUNTER — HOSPITAL ENCOUNTER (OUTPATIENT)
Dept: PHYSICAL THERAPY | Age: 49
Setting detail: THERAPIES SERIES
Discharge: HOME | End: 2024-04-05
Payer: COMMERCIAL

## 2024-04-05 PROCEDURE — 97530 THERAPEUTIC ACTIVITIES: CPT

## 2024-04-05 PROCEDURE — 97110 THERAPEUTIC EXERCISES: CPT

## 2024-04-05 NOTE — FLOWSHEET NOTE
Other Therapeutic Activities/Education:        Home Exercise Program:        Manual Treatments:        Modalities:        Communication with other providers:        Assessment:  (Response towards treatment session) (Pain Rating)   Pt tolerated  treatment without any adverse reactions or complications this date. . Pt would continue to benefit from skilled therapy interventions to address remaining impairments, improve mobility and strength,  and progress toward goal completion and prepare for d/c including finalizing HEP ;  while reducing risk for re-injury or further decline.  Pt performed  all listed interventions and demonstrated adequate to good form with no reports of increased pain.  Pt reported temporary  muscle strain and fatigue during today's session..Pain complaints after session 6/10 \"same\"      Plan for Next Session:        Time In / Time Out: 1307- 1350         I  Timed Code/Total Treatment Minutes:  43 min (2 TE; 1 TA)      Patient approved for 160 units from 2/9/2024 to 6/30/2024.     Cpt codes approved:  50 units            79699                                      25 units           93260                                      20 units           91964                                      25 units           68007                                      25 units           20122                                      15 units           57068        CPT Code Units today Running Total Units Total approved    TE 31335  2 5 50   MAN 67785       Gait 33244      NR 57595      TA  09115 1 1 25   Estim Unatt 560[address]        [address]      [facility] [place] [place] [address]      [facility] [address]       ADL/Self care 47911      DNT 1-2 79540      DNT 3-4 72629      Other:                Total for episode of care               Next Progress Note due:        Plan of Care Interventions:  [x] Therapeutic Exercise  [] Modalities:  [x] Therapeutic Activity     [] Ultrasound  [] Estim  [x] Gait Training      [] Cervical Traction [] Lumbar

## 2024-04-09 ENCOUNTER — HOSPITAL ENCOUNTER (OUTPATIENT)
Dept: PHYSICAL THERAPY | Age: 49
Discharge: HOME | End: 2024-04-09

## 2024-04-09 ENCOUNTER — TELEPHONE (OUTPATIENT)
Dept: ORTHOPEDIC SURGERY | Age: 49
End: 2024-04-09

## 2024-04-09 DIAGNOSIS — Z96.651 S/P TOTAL KNEE ARTHROPLASTY, RIGHT: Primary | ICD-10-CM

## 2024-04-09 RX ORDER — TIZANIDINE 4 MG/1
4 TABLET ORAL EVERY 8 HOURS PRN
Qty: 30 TABLET | Refills: 0 | Status: SHIPPED | OUTPATIENT
Start: 2024-04-09

## 2024-04-09 RX ORDER — OXYCODONE HYDROCHLORIDE AND ACETAMINOPHEN 5; 325 MG/1; MG/1
1 TABLET ORAL EVERY 6 HOURS PRN
Qty: 28 TABLET | Refills: 0 | Status: SHIPPED | OUTPATIENT
Start: 2024-04-09 | End: 2024-04-16

## 2024-04-09 NOTE — TELEPHONE ENCOUNTER
Pt called and LMOVM requesting a refill of pain medication and muscle relaxer. She states that the swelling is persistent with no relief from ice. Please advise.

## 2024-04-09 NOTE — FLOWSHEET NOTE
Physical Therapy  Cancellation/No-show Note  Patient Name:  Noa Clark  :  1975   Date:  2024  Cancelled visits to date: 1  No-shows to date: 0    For today's appointment patient:  [x]  Cancelled  []  Rescheduled appointment  []  No-show     Reason given by patient:  [x]  Patient ill  []  Conflicting appointment  []  No transportation    []  Conflict with work  []  No reason given  []  Other:     Comments:      Electronically signed by:  Socorro Heranndez PTA

## 2024-04-12 ENCOUNTER — HOSPITAL ENCOUNTER (OUTPATIENT)
Dept: PHYSICAL THERAPY | Age: 49
Setting detail: THERAPIES SERIES
Discharge: HOME | End: 2024-04-12
Payer: COMMERCIAL

## 2024-04-12 PROCEDURE — 97530 THERAPEUTIC ACTIVITIES: CPT

## 2024-04-12 PROCEDURE — 97110 THERAPEUTIC EXERCISES: CPT

## 2024-04-12 NOTE — FLOWSHEET NOTE
3' 1/2 roll x 4' 1/2 roll x 4'     Heel slides w/slide board   10x 5\" with strap 10x 10\" with strap   SLR  AA 5x 10x no assistance; mild lag   STANDING      Slant board x1' x1' x1'                                            PROPRIOCEPTION                                    MODALITIES                      Other Therapeutic Activities/Education:        Home Exercise Program:        Manual Treatments:        Modalities:        Communication with other providers:        Assessment:  (Response towards treatment session) (Pain Rating)   Pt tolerated  treatment without any adverse reactions or complications this date. . Pt would continue to benefit from skilled therapy interventions to address remaining impairments, improve mobility and strength,  and progress toward goal completion and prepare for d/c including finalizing HEP ;  while reducing risk for re-injury or further decline.  Pt performed  all listed interventions and demonstrated adequate to good form with no reports of increased pain.  Pt reported temporary  muscle strain and fatigue during today's session..Pain complaints after session 6/10 \"same\"      Plan for Next Session:        Time In / Time Out: 1347- 1427       I  Timed Code/Total Treatment Minutes:  40 min (2 TE; 1 TA)      Patient approved for 160 units from 2/9/2024 to 6/30/2024.     Cpt codes approved:  50 units            73462                                      25 units           24395                                      20 units           02029                                      25 units           56146                                      25 units           06125                                      15 units           92024        CPT Code Units today Running Total Units Total approved    TE 76581  2 7 50   MAN 39792       Gait 45858      NR 51443      TA  99113 1 2 25   Estim Unatt [address]        [address]      [facility] [place] [address]      VASO 17456       ADL/Self care 86316      DNT 1-2 20560      DNT

## 2024-04-16 ENCOUNTER — HOSPITAL ENCOUNTER (OUTPATIENT)
Dept: PHYSICAL THERAPY | Age: 49
Setting detail: THERAPIES SERIES
Discharge: HOME | End: 2024-04-16
Payer: COMMERCIAL

## 2024-04-16 PROCEDURE — 97110 THERAPEUTIC EXERCISES: CPT

## 2024-04-16 PROCEDURE — 97530 THERAPEUTIC ACTIVITIES: CPT

## 2024-04-16 NOTE — FLOWSHEET NOTE
Heel prop 1/2 roll x 4' 1/2 roll x 4'      Heel slides w/slide board  10x 5\" with strap 10x 10\" with strap 10x 10\" with strap   SLR AA 5x 10x no assistance; mild lag 10x no assistance; mild lag   STANDING      Slant board x1' x1' 1 min                                             PROPRIOCEPTION                                    MODALITIES                      Other Therapeutic Activities/Education:  Gait training with SPC      Home Exercise Program:        Manual Treatments:        Modalities:        Communication with other providers:        Assessment:  (Response towards treatment session) (Pain Rating) No change in pain at the end of treatment. Able to complete full revolutions on the bike.          Pt tolerated  treatment without any adverse reactions or complications this date. . Pt would continue to benefit from skilled therapy interventions to address remaining impairments, improve mobility and strength,  and progress toward goal completion and prepare for d/c including finalizing HEP ;  while reducing risk for re-injury or further decline.  Pt performed  all listed interventions and demonstrated adequate to good form with no reports of increased pain.  Pt reported temporary  muscle strain and fatigue during today's session..Pain complaints after session 6/10 \"same\"      Plan for Next Session:        Time In / Time Out: 1345/1430       I  Timed Code/Total Treatment Minutes:  45 min (2 TE; 1 TA)      Patient approved for 160 units from 2/9/2024 to 6/30/2024.     Cpt codes approved:  50 units            93665                                      25 units           23506                                      20 units           60159                                      25 units           16539                                      25 units           73058                                      15 units           72618        CPT Code Units today Running Total Units Total approved    TE 85880  2 9 50   MAN 46387

## 2024-04-17 ENCOUNTER — OFFICE VISIT (OUTPATIENT)
Dept: ORTHOPEDIC SURGERY | Age: 49
End: 2024-04-17

## 2024-04-17 VITALS — BODY MASS INDEX: 47.05 KG/M2 | OXYGEN SATURATION: 93 % | HEIGHT: 61 IN | RESPIRATION RATE: 16 BRPM | HEART RATE: 93 BPM

## 2024-04-17 DIAGNOSIS — Z96.651 STATUS POST RIGHT KNEE REPLACEMENT: Primary | ICD-10-CM

## 2024-04-17 PROCEDURE — 99024 POSTOP FOLLOW-UP VISIT: CPT | Performed by: ORTHOPAEDIC SURGERY

## 2024-04-17 RX ORDER — METHOCARBAMOL 750 MG/1
750 TABLET, FILM COATED ORAL 3 TIMES DAILY
Qty: 90 TABLET | Refills: 0 | Status: SHIPPED | OUTPATIENT
Start: 2024-04-17 | End: 2024-05-17

## 2024-04-17 RX ORDER — OXYCODONE HYDROCHLORIDE 5 MG/1
5 TABLET ORAL EVERY 6 HOURS PRN
Qty: 28 TABLET | Refills: 0 | Status: SHIPPED | OUTPATIENT
Start: 2024-04-17 | End: 2024-04-24

## 2024-04-17 ASSESSMENT — ENCOUNTER SYMPTOMS
BACK PAIN: 0
COLOR CHANGE: 0
EYE REDNESS: 0
CHEST TIGHTNESS: 0
ABDOMINAL PAIN: 0

## 2024-04-17 NOTE — PROGRESS NOTES
Patient seen in office today for 3wk post op RT TKA DOS 3/28/24. 8/10 pain level. Using FWW for ambulation, stated she tried a cane but was unsuccessful. Stated she has a lot of cramping/aching at night. Is icing/elevating to help alleviate. Participating in PT.

## 2024-04-17 NOTE — PROGRESS NOTES
Subjective:      Patient ID: Noa Clark is a 49 y.o. female.      Patient seen in office today for 3wk post op RT TKA DOS 3/28/24. 8/10 pain level. Using FWW for ambulation, stated she tried a cane but was unsuccessful. Stated she has a lot of cramping/aching at night. Is icing/elevating to help alleviate. Participating in PT.         She comes in today for her 3-week postop recheck.  Overall she states that she continues to have lots of swelling as well as muscle spasms and difficulty sleeping at night.  She continues working with physical therapy and her range of motion is improving.    Patient denies any new injury to the involved extremity/ joint, denies numbness or tingling in the involved extremity and denies fever or chills.            Review of Systems   Constitutional:  Negative for activity change, chills and fever.   HENT:  Negative for congestion and drooling.    Eyes:  Negative for redness.   Respiratory:  Negative for chest tightness.    Cardiovascular:  Negative for chest pain.   Gastrointestinal:  Negative for abdominal pain.   Endocrine: Negative for cold intolerance and heat intolerance.   Musculoskeletal:  Positive for arthralgias, gait problem, joint swelling and myalgias. Negative for back pain.   Skin:  Negative for color change, pallor, rash and wound.   Neurological:  Negative for weakness and numbness.   Psychiatric/Behavioral:  Negative for confusion.        Past Medical History:   Diagnosis Date    Anxiety     Depression     GERD (gastroesophageal reflux disease)     H/O echocardiogram 10/19/2023    Ef 55-60%. Grade I diastolic dysfunction. Normal left ventricular size and Mild-to-moderate pulmonic regurgitation present    Headache     migraines    Hx of exercise stress test 10/24/2023    Exercise tolerance is good. Exercised for 6:00 min on Gomez protocol achieving workload of 7.00 METS.    Hyperlipidemia     Osteoarthritis of right knee     Palpitation     PONV (postoperative nausea and

## 2024-04-17 NOTE — PATIENT INSTRUCTIONS
Continue weight-bearing as tolerated.  Continue range of motion exercises as instructed.  Ice and elevate as needed.  Tylenol or Motrin for pain.  Follow up in 3 weeks.    We are committed to providing you the best care possible.     If you receive a survey after visiting one of our offices, please take time to share your experience concerning your physician office visit.  These surveys are confidential and no health information about you is shared.     We are eager to improve for you and we are counting on your feedback to help make that happen.

## 2024-04-19 ENCOUNTER — HOSPITAL ENCOUNTER (OUTPATIENT)
Dept: PHYSICAL THERAPY | Age: 49
Setting detail: THERAPIES SERIES
Discharge: HOME | End: 2024-04-19
Payer: COMMERCIAL

## 2024-04-19 PROCEDURE — 97530 THERAPEUTIC ACTIVITIES: CPT

## 2024-04-19 PROCEDURE — 97110 THERAPEUTIC EXERCISES: CPT

## 2024-04-19 NOTE — FLOWSHEET NOTE
Outpatient Physical Therapy  [place]           [x] Phone: [phone]   Fax: [phone]  [place]           [] Phone: [phone]   Fax: [phone]        Physical Therapy Daily Treatment Note  Date:  2024    Patient Name:  Noa Clark    :  1975  MRN: [id number]  Restrictions/Precautions: No data recorded      Diagnosis:   Primary osteoarthritis of right knee [M17.11]  Chronic pain of right knee [M25.561, G89.29] Diagnosis: R TKA 3/28/24  Date of Injury/Surgery:   Treatment Diagnosis:     Insurance/Certification information: Pre cert  Referring Physician:  Randell Isaac DO    PCP: Tesha Biswas MD  Next Doctor Visit:    Plan of care signed (Y/N):    Outcome Measure:   Visit# / total visits:  5/10  Pain level: 310   Goals:     Patient goals: get back to walking without suppport   Patient will I SLR x 5 reps with mod Lag in 3 weeks  Long Term Goals  Time Frame for Long Term Goals:  6 weeks   Patient will complete TUG in 12 sec with cane   Patient will score 20/80 on LEFS   Patient will walk 225 ft in 2 MWT using cane               Summary of Evaluation:           Subjective:  Patient reports of 3/10 pain upon arrival and appears amb w/RW    she reports her phys is pleased with her progress but does not want her on a cane at this point         Any changes in Ambulatory Summary Sheet?  None        Objective:  Able to complete full revolutions backward  R knee flexion AAROM 100 deg  Minimal lag in SLR with independent lifting  Unable to get sock on        Exercises: (No more than 4 columns)   Exercise/Equipment 2024  (5)         Needs LEFS   WARM UP         nustep S8 A11 L3 x10' S8/A9 Lv3 10'    Bike  5 min  S3 5'    TABLE      Seated dangle X10 3 ct hold  AROM flex/ext Flex/LAQ  15x5\"    SAQ Med roll 5 ct   2 x10 reps 1/2 roll 2x10 5\"    Supine ABD/ADD AROM W/slide board   2 x 10 W/slide board   2 x 10    Heel prop        Heel slides w/slide board  10x 10\" with strap 10x

## 2024-04-23 ENCOUNTER — HOSPITAL ENCOUNTER (OUTPATIENT)
Dept: PHYSICAL THERAPY | Age: 49
Setting detail: THERAPIES SERIES
Discharge: HOME | End: 2024-04-23
Payer: COMMERCIAL

## 2024-04-23 PROCEDURE — 97110 THERAPEUTIC EXERCISES: CPT

## 2024-04-23 PROCEDURE — 97530 THERAPEUTIC ACTIVITIES: CPT

## 2024-04-23 NOTE — FLOWSHEET NOTE
Total for episode of care               Next Progress Note due:        Plan of Care Interventions:  [x] Therapeutic Exercise  [] Modalities:  [x] Therapeutic Activity     [] Ultrasound  [] Estim  [x] Gait Training      [] Cervical Traction [] Lumbar Traction  [x] Neuromuscular Re-education    [] Cold/hotpack [] Iontophoresis   [x] Instruction in HEP      [] Vasopneumatic   [] Dry Needling    [x] Manual Therapy               [] Aquatic Therapy              Electronically signed by:  ZACKERY Mullins PT, 4/23/2024, 1:47 PM

## 2024-04-24 RX ORDER — LOSARTAN POTASSIUM 25 MG/1
25 TABLET ORAL DAILY
Qty: 90 TABLET | Refills: 2 | Status: SHIPPED | OUTPATIENT
Start: 2024-04-24 | End: 2025-01-19

## 2024-04-26 ENCOUNTER — HOSPITAL ENCOUNTER (OUTPATIENT)
Dept: PHYSICAL THERAPY | Age: 49
Setting detail: THERAPIES SERIES
Discharge: HOME | End: 2024-04-26
Payer: COMMERCIAL

## 2024-04-26 NOTE — FLOWSHEET NOTE
board   2 x 10 stop --------------   Heel prop   Foam block 3' Foam block     Heel slides w/slide board  10x 10\" with strap 10x 10\" with strap 10x 10\" with strap 10x 10\" with strap   SLR 10x no assistance; mild lag 2x10 w/mild lag 2x10 w/mild lag 2x 10 with initiating QS; very minimal lag   STANDING       Slant board 1 min  1' 1' 1'   Hurdles    FWD 3 laps FWD 3 laps                                           PROPRIOCEPTION                                          MODALITIES                         Other Therapeutic Activities/Education:  Gait training with SPC      Home Exercise Program:        Manual Treatments:        Modalities:        Communication with other providers:        Assessment:  (Response towards treatment session) (Pain Rating) Little less pain at the end of treatment.          Pt tolerated  treatment without any adverse reactions or complications this date. . Pt would continue to benefit from skilled therapy interventions to address remaining impairments, improve mobility and strength,  and progress toward goal completion and prepare for d/c including finalizing HEP ;  while reducing risk for re-injury or further decline.  Pt performed  all listed interventions and demonstrated adequate to good form with no reports of increased pain.  Pt reported temporary  muscle strain and fatigue during today's session..Pain complaints after session 3/10      Plan for Next Session:        Time In / Time Out: 5505-1437     I  Timed Code/Total Treatment Minutes:  44 min (2 TE; 1 TA)           Patient approved for 160 units from 2/9/2024 to 6/30/2024.     Cpt codes approved:  50 units            88796                                      25 units           76878                                      20 units           50733                                      25 units           25529                                      25 units           82625                                      15 units           55305        CPT

## 2024-04-29 ENCOUNTER — OFFICE VISIT (OUTPATIENT)
Dept: BARIATRICS/WEIGHT MGMT | Age: 49
End: 2024-04-29
Payer: COMMERCIAL

## 2024-04-29 VITALS
SYSTOLIC BLOOD PRESSURE: 108 MMHG | DIASTOLIC BLOOD PRESSURE: 80 MMHG | HEIGHT: 61 IN | OXYGEN SATURATION: 99 % | WEIGHT: 250.1 LBS | HEART RATE: 80 BPM | BODY MASS INDEX: 47.22 KG/M2

## 2024-04-29 DIAGNOSIS — E66.01 MORBID OBESITY WITH BMI OF 45.0-49.9, ADULT (HCC): Primary | ICD-10-CM

## 2024-04-29 DIAGNOSIS — I10 ESSENTIAL HYPERTENSION: ICD-10-CM

## 2024-04-29 DIAGNOSIS — Z96.651 STATUS POST RIGHT KNEE REPLACEMENT: Primary | ICD-10-CM

## 2024-04-29 DIAGNOSIS — E78.00 ELEVATED LOW DENSITY LIPOPROTEIN (LDL) CHOLESTEROL LEVEL: ICD-10-CM

## 2024-04-29 DIAGNOSIS — Z01.818 PRE-OP EVALUATION: ICD-10-CM

## 2024-04-29 PROCEDURE — 3074F SYST BP LT 130 MM HG: CPT | Performed by: NURSE PRACTITIONER

## 2024-04-29 PROCEDURE — G8417 CALC BMI ABV UP PARAM F/U: HCPCS | Performed by: NURSE PRACTITIONER

## 2024-04-29 PROCEDURE — 1036F TOBACCO NON-USER: CPT | Performed by: NURSE PRACTITIONER

## 2024-04-29 PROCEDURE — 99213 OFFICE O/P EST LOW 20 MIN: CPT | Performed by: NURSE PRACTITIONER

## 2024-04-29 PROCEDURE — G8427 DOCREV CUR MEDS BY ELIG CLIN: HCPCS | Performed by: NURSE PRACTITIONER

## 2024-04-29 PROCEDURE — 3079F DIAST BP 80-89 MM HG: CPT | Performed by: NURSE PRACTITIONER

## 2024-04-29 RX ORDER — SODIUM CHLORIDE 9 MG/ML
25 INJECTION, SOLUTION INTRAVENOUS PRN
OUTPATIENT
Start: 2024-04-29

## 2024-04-29 RX ORDER — OXYCODONE HYDROCHLORIDE 5 MG/1
5 TABLET ORAL EVERY 6 HOURS PRN
Qty: 28 TABLET | Refills: 0 | Status: SHIPPED | OUTPATIENT
Start: 2024-04-29 | End: 2024-05-06

## 2024-04-29 RX ORDER — SODIUM CHLORIDE, SODIUM LACTATE, POTASSIUM CHLORIDE, CALCIUM CHLORIDE 600; 310; 30; 20 MG/100ML; MG/100ML; MG/100ML; MG/100ML
INJECTION, SOLUTION INTRAVENOUS CONTINUOUS
OUTPATIENT
Start: 2024-04-29

## 2024-04-29 RX ORDER — SODIUM CHLORIDE 0.9 % (FLUSH) 0.9 %
5-40 SYRINGE (ML) INJECTION EVERY 12 HOURS SCHEDULED
OUTPATIENT
Start: 2024-04-29

## 2024-04-29 RX ORDER — SODIUM CHLORIDE 0.9 % (FLUSH) 0.9 %
5-40 SYRINGE (ML) INJECTION PRN
OUTPATIENT
Start: 2024-04-29

## 2024-04-29 NOTE — PROGRESS NOTES
BARIATRIC SURGERY OFFICE PROGRESS NOTE    SUBJECTIVE:    Patient presenting today referred from Tesha Biswas MD, for   Chief Complaint   Patient presents with    Weight Management     4th Surg WM Visit  +Card  +Pulm  +Psych   .    Vitals:    04/29/24 1125   BP: 108/80   Pulse: 80   SpO2: 99%        BMI: Body mass index is 47.26 kg/m².     Weight History:   Wt Readings from Last 3 Encounters:   04/29/24 113.4 kg (250 lb 1.6 oz)   03/21/24 112.9 kg (249 lb)   03/13/24 113.3 kg (249 lb 12.8 oz)         HPI:Noa Clark is a 49 y.o. female presenting in fourth bariatric PRE-OP visit    Diet Recall:    Total weight loss/gain: -0.6 lbs since last visit, and -4.5 lbs since starting program     Protein: tracking calories about 1200 daily  Water Intake: good  Exercise: per physical therapy  New health problems: recent RTKR  New medications: pain meds  Clearances:  -- Cardiology: cleared 2/15/2024   -- Pulmonology: cleared 3/21/2024  -- Psychology: cleared 3/18/2024    Thoroughly reviewed the patient's medical history, family history, social history and review of systems with the patient today in the office.  Please see medical record for pertinent positives.      Past Medical History:   Diagnosis Date    Anxiety     Depression     GERD (gastroesophageal reflux disease)     H/O echocardiogram 10/19/2023    Ef 55-60%. Grade I diastolic dysfunction. Normal left ventricular size and Mild-to-moderate pulmonic regurgitation present    Headache     migraines    Hx of exercise stress test 10/24/2023    Exercise tolerance is good. Exercised for 6:00 min on Gomez protocol achieving workload of 7.00 METS.    Hyperlipidemia     Osteoarthritis of right knee     Palpitation     PONV (postoperative nausea and vomiting)     Thyroid disease     Vitamin D deficiency         Patient Active Problem List   Diagnosis    TIA (transient ischemic attack)    Paresthesia of tongue    Migraine headache without aura    Complicated migraine

## 2024-04-30 ENCOUNTER — TELEPHONE (OUTPATIENT)
Dept: BARIATRICS/WEIGHT MGMT | Age: 49
End: 2024-04-30

## 2024-04-30 ENCOUNTER — PREP FOR PROCEDURE (OUTPATIENT)
Dept: BARIATRICS/WEIGHT MGMT | Age: 49
End: 2024-04-30

## 2024-04-30 ENCOUNTER — HOSPITAL ENCOUNTER (OUTPATIENT)
Dept: PHYSICAL THERAPY | Age: 49
Discharge: HOME | End: 2024-04-30

## 2024-04-30 DIAGNOSIS — Z01.818 PRE-OPERATIVE CLEARANCE: ICD-10-CM

## 2024-04-30 NOTE — TELEPHONE ENCOUNTER
SPOKE TO  Noa Clark REGARDING SURGERY (EGD) SCHEDULED @ [facility]. NOTIFIED OF DATES, TIMES AND LOCATION    PHONE ASSESSMENT   SURGERY - 5/10/24 @ 8 AM  P/O - 5/16/24 @ 9:45AM    NPO AFTER MIDNIGHT    HOLD BLOOD THINNERS - NA

## 2024-04-30 NOTE — FLOWSHEET NOTE
Physical Therapy  Cancellation/No-show Note  Patient Name:  Noa Clark  :  1975   Date:  2024  Cancelled visits to date: 2  No-shows to date: 0    For today's appointment patient:  [x]  Cancelled  []  Rescheduled appointment  []  No-show     Reason given by patient:  []  Patient ill  []  Conflicting appointment  []  No transportation    []  Conflict with work  [x]  No reason given  []  Other:     Comments:      Electronically signed by:  Linda Rivers, PTA

## 2024-05-02 NOTE — PROGRESS NOTES
Procedure @ [facility] on 5/10/24 you will be called 5/9/24 with times    NOTHING TO EAT OR DRINK AFTER MIDNIGHT DAY OF SURGERY  FOLLOW OFFICE INSTRUCTIONS FOR ANY NECESSARY PREP     1. Enter thru the hospital main entrance on day of surgery, check in at the Information Desk. If you arrive prior to 6:00am, enter thru the ER entrance.    2. Follow the directions as prescribed by the doctor for your procedure and medications.         Morning of surgery take:  Amlodipine, Buspar, Synthroid with sips of water          Stop vitamins, supplements and NSAIDS:  5/3/2024    3. Check with your Doctor regarding stopping blood thinners and follow their instructions.    4. Do not smoke, vape or use chewing tobacco morning of surgery. Do not drink any alcoholic beverages 24 hours prior to surgery.       This includes NA Beer. No street drugs 7 days prior to surgery.    5. If you have dentures, contacts of glasses they will be removed before going to the OR; please bring a case.    6. Please bring picture ID, insurance card, paperwork from the doctor’s office (H & P, Consent, & card for implantable devices).    7. Take a shower with an antibacterial soap the night before surgery and the morning of surgery. Do not put anything on your skin      After your morning shower.    8. You will need a responsible adult to drive you home and check on you after surgery.

## 2024-05-08 ENCOUNTER — OFFICE VISIT (OUTPATIENT)
Dept: ORTHOPEDIC SURGERY | Age: 49
End: 2024-05-08

## 2024-05-08 VITALS — OXYGEN SATURATION: 98 % | RESPIRATION RATE: 17 BRPM | HEIGHT: 61 IN | BODY MASS INDEX: 47.24 KG/M2 | HEART RATE: 85 BPM

## 2024-05-08 DIAGNOSIS — Z96.651 STATUS POST RIGHT KNEE REPLACEMENT: Primary | ICD-10-CM

## 2024-05-08 PROCEDURE — 99024 POSTOP FOLLOW-UP VISIT: CPT | Performed by: ORTHOPAEDIC SURGERY

## 2024-05-08 ASSESSMENT — ENCOUNTER SYMPTOMS
CHEST TIGHTNESS: 0
BACK PAIN: 0
EYE REDNESS: 0
ABDOMINAL PAIN: 0
COLOR CHANGE: 0

## 2024-05-08 NOTE — PATIENT INSTRUCTIONS
Continue weight-bearing as tolerated.  Continue range of motion exercises as instructed.  Ice and elevate as needed.  Tylenol or Motrin for pain.  Follow up in 6 weeks.    We are committed to providing you the best care possible.     If you receive a survey after visiting one of our offices, please take time to share your experience concerning your physician office visit.  These surveys are confidential and no health information about you is shared.     We are eager to improve for you and we are counting on your feedback to help make that happen.

## 2024-05-08 NOTE — PROGRESS NOTES
Patient seen in office today for 6wk post op RT TKA DOS 3/28/24. X-rays taken. 5/10 pain level. No longer doing PT. No AE for ambulation into office.   
Adequate: hears normal conversation without difficulty
well-developed.   HENT:      Head: Normocephalic.      Nose: No congestion.   Eyes:      Pupils: Pupils are equal, round, and reactive to light.   Pulmonary:      Effort: Pulmonary effort is normal.   Musculoskeletal:         General: Swelling present. No tenderness or deformity. Normal range of motion.      Cervical back: Normal range of motion.      Right hip: Normal.      Left hip: Normal.      Right knee: Swelling present. No deformity, effusion, erythema, ecchymosis, lacerations, bony tenderness or crepitus. Normal range of motion. No tenderness. No lateral joint line, MCL or LCL tenderness. No LCL laxity or MCL laxity. Normal alignment and normal patellar mobility.      Left knee: Normal. No swelling, deformity, effusion, erythema, ecchymosis, lacerations or bony tenderness. Normal range of motion. No tenderness. No medial joint line, lateral joint line, MCL, LCL or patellar tendon tenderness. No LCL laxity or MCL laxity.Normal alignment and normal patellar mobility.   Skin:     General: Skin is warm and dry.      Capillary Refill: Capillary refill takes less than 2 seconds.      Coloration: Skin is not pale.      Findings: No erythema or rash.   Neurological:      Mental Status: She is alert and oriented to person, place, and time.      Sensory: No sensory deficit.      Motor: No weakness.       Right knee-Incision clean, dry, intact, with no erythema, no drainage, and no signs of infection.  0-120    XR KNEE RIGHT (3 VIEWS)    Result Date: 5/8/2024  XRAY X-ray 3 views of the right knee obtained and reviewed by me today in the office demonstrates age appropriate bone density throughout with well-positioned right total knee arthroplasty, there has been no change in position of components compared to prior x-rays, normal tracking of the patella, no acute osseous abnormalities. Impression: Stable right total knee arthroplasty with no acute process.        Assessment:      Right TKA, 6 weeks      Plan:

## 2024-05-09 ENCOUNTER — ANESTHESIA EVENT (OUTPATIENT)
Dept: ENDOSCOPY | Age: 49
End: 2024-05-09
Payer: COMMERCIAL

## 2024-05-09 NOTE — ANESTHESIA PRE PROCEDURE
Department of Anesthesiology  Preprocedure Note       Name:  Noa Clark   Age:  49 y.o.  :  1975                                          MRN:  [id number]         Date:  2024      Surgeon: Surgeon(s):  Cristhian Anand II, MD    Procedure: Procedure(s):  ESOPHAGOGASTRODUODENOSCOPY    Medications prior to admission:   Prior to Admission medications    Medication Sig Start Date End Date Taking? Authorizing Provider   losartan (COZAAR) 25 MG tablet TAKE 1 TABLET BY MOUTH DAILY 24  Emeka Estevez MD   methocarbamol (ROBAXIN-750) 750 MG tablet Take 1 tablet by mouth 3 times daily 24  Randell Isaac DO   tiZANidine (ZANAFLEX) 4 MG tablet Take 1 tablet by mouth every 8 hours as needed (spasms) 24   Randell Isaac DO   buPROPion (WELLBUTRIN) 100 MG tablet Take 1 tablet by mouth 2 times daily 24   Anna Todd MD   levothyroxine (SYNTHROID) 25 MCG tablet Take 1 tablet by mouth Daily    Anna Todd MD   amLODIPine (NORVASC) 5 MG tablet Take 1.5 tablets by mouth daily Taking 1.5 tablets daily 11/2/23 10/27/24  Emeka Estevez MD   atorvastatin (LIPITOR) 80 MG tablet Take 1 tablet by mouth daily 10/6/23   Emeka Estevez MD   QUEtiapine (SEROQUEL) 50 MG tablet  23   Anna Todd MD   nitroGLYCERIN (NITROSTAT) 0.4 MG SL tablet Place 1 tablet under the tongue every 5 minutes as needed for Chest pain 23   Emeka Estevez MD   zolpidem (AMBIEN) 5 MG tablet Take 1 tablet by mouth nightly as needed for Sleep.    Anna Todd MD   busPIRone (BUSPAR) 10 MG tablet Take 1 tablet by mouth in the morning and at bedtime    Anna Todd MD   escitalopram (LEXAPRO) 20 MG tablet Take 1 tablet by mouth daily 3/17/21   Anna Todd MD       Current medications:    No current facility-administered medications for this encounter.     Current Outpatient Medications   Medication Sig Dispense Refill   • losartan (COZAAR) 25 MG  by Emeka Estevez MD (Interpreting   physician) on 10/19/2023 at 12:00 PM       Neuro/Psych:   (+) TIA, headaches: migraine headachesdepression/anxiety             GI/Hepatic/Renal:   (+) GERD:, morbid obesity          Endo/Other:                     Abdominal:   (+) obese          Vascular:          Other Findings:         Anesthesia Plan      MAC     ASA 2       Induction: intravenous.      Anesthetic plan and risks discussed with patient.      Plan discussed with CRNA.    Attending anesthesiologist reviewed and agrees with Preprocedure content            JOSÉ Medina - CRNA   5/9/2024

## 2024-05-09 NOTE — PROGRESS NOTES
Spoke with patient and she will arrive at 0630 at [facility] on 5/10/2024 for her procedure at 0800. Orders are in [facility].

## 2024-05-10 ENCOUNTER — HOSPITAL ENCOUNTER (OUTPATIENT)
Age: 49
Setting detail: OUTPATIENT SURGERY
Discharge: HOME | End: 2024-05-10
Attending: SURGERY | Admitting: SURGERY
Payer: COMMERCIAL

## 2024-05-10 ENCOUNTER — ANESTHESIA (OUTPATIENT)
Dept: ENDOSCOPY | Age: 49
End: 2024-05-10
Payer: COMMERCIAL

## 2024-05-10 VITALS
SYSTOLIC BLOOD PRESSURE: 131 MMHG | OXYGEN SATURATION: 96 % | TEMPERATURE: 97.8 F | WEIGHT: 245 LBS | HEART RATE: 85 BPM | DIASTOLIC BLOOD PRESSURE: 64 MMHG | BODY MASS INDEX: 46.26 KG/M2 | HEIGHT: 61 IN | RESPIRATION RATE: 16 BRPM

## 2024-05-10 DIAGNOSIS — Z01.818 PRE-OPERATIVE CLEARANCE: ICD-10-CM

## 2024-05-10 PROCEDURE — 2500000003 HC RX 250 WO HCPCS: Performed by: NURSE ANESTHETIST, CERTIFIED REGISTERED

## 2024-05-10 PROCEDURE — 6360000002 HC RX W HCPCS: Performed by: NURSE ANESTHETIST, CERTIFIED REGISTERED

## 2024-05-10 PROCEDURE — 3700000000 HC ANESTHESIA ATTENDED CARE: Performed by: SURGERY

## 2024-05-10 PROCEDURE — 7100000011 HC PHASE II RECOVERY - ADDTL 15 MIN: Performed by: SURGERY

## 2024-05-10 PROCEDURE — 88342 IMHCHEM/IMCYTCHM 1ST ANTB: CPT

## 2024-05-10 PROCEDURE — 43239 EGD BIOPSY SINGLE/MULTIPLE: CPT | Performed by: SURGERY

## 2024-05-10 PROCEDURE — 2580000003 HC RX 258: Performed by: NURSE PRACTITIONER

## 2024-05-10 PROCEDURE — 3609012400 HC EGD TRANSORAL BIOPSY SINGLE/MULTIPLE: Performed by: SURGERY

## 2024-05-10 PROCEDURE — 6360000002 HC RX W HCPCS: Performed by: NURSE PRACTITIONER

## 2024-05-10 PROCEDURE — 88305 TISSUE EXAM BY PATHOLOGIST: CPT

## 2024-05-10 PROCEDURE — 3700000001 HC ADD 15 MINUTES (ANESTHESIA): Performed by: SURGERY

## 2024-05-10 PROCEDURE — 7100000010 HC PHASE II RECOVERY - FIRST 15 MIN: Performed by: SURGERY

## 2024-05-10 PROCEDURE — 2709999900 HC NON-CHARGEABLE SUPPLY: Performed by: SURGERY

## 2024-05-10 RX ORDER — SODIUM CHLORIDE 0.9 % (FLUSH) 0.9 %
5-40 SYRINGE (ML) INJECTION EVERY 12 HOURS SCHEDULED
Status: DISCONTINUED | OUTPATIENT
Start: 2024-05-10 | End: 2024-05-10 | Stop reason: HOSPADM

## 2024-05-10 RX ORDER — LIDOCAINE HYDROCHLORIDE 20 MG/ML
INJECTION, SOLUTION EPIDURAL; INFILTRATION; INTRACAUDAL; PERINEURAL PRN
Status: DISCONTINUED | OUTPATIENT
Start: 2024-05-10 | End: 2024-05-10 | Stop reason: SDUPTHER

## 2024-05-10 RX ORDER — PROPOFOL 10 MG/ML
INJECTION, EMULSION INTRAVENOUS PRN
Status: DISCONTINUED | OUTPATIENT
Start: 2024-05-10 | End: 2024-05-10 | Stop reason: SDUPTHER

## 2024-05-10 RX ORDER — SODIUM CHLORIDE 0.9 % (FLUSH) 0.9 %
5-40 SYRINGE (ML) INJECTION PRN
Status: DISCONTINUED | OUTPATIENT
Start: 2024-05-10 | End: 2024-05-10 | Stop reason: HOSPADM

## 2024-05-10 RX ORDER — SODIUM CHLORIDE, SODIUM LACTATE, POTASSIUM CHLORIDE, CALCIUM CHLORIDE 600; 310; 30; 20 MG/100ML; MG/100ML; MG/100ML; MG/100ML
INJECTION, SOLUTION INTRAVENOUS CONTINUOUS
Status: DISCONTINUED | OUTPATIENT
Start: 2024-05-10 | End: 2024-05-10 | Stop reason: HOSPADM

## 2024-05-10 RX ORDER — SODIUM CHLORIDE 9 MG/ML
25 INJECTION, SOLUTION INTRAVENOUS PRN
Status: DISCONTINUED | OUTPATIENT
Start: 2024-05-10 | End: 2024-05-10 | Stop reason: HOSPADM

## 2024-05-10 RX ORDER — ONDANSETRON 2 MG/ML
INJECTION INTRAMUSCULAR; INTRAVENOUS PRN
Status: DISCONTINUED | OUTPATIENT
Start: 2024-05-10 | End: 2024-05-10 | Stop reason: SDUPTHER

## 2024-05-10 RX ADMIN — LIDOCAINE HYDROCHLORIDE 100 MG: 20 INJECTION, SOLUTION EPIDURAL; INFILTRATION; INTRACAUDAL; PERINEURAL at 08:28

## 2024-05-10 RX ADMIN — SODIUM CHLORIDE, POTASSIUM CHLORIDE, SODIUM LACTATE AND CALCIUM CHLORIDE: 600; 310; 30; 20 INJECTION, SOLUTION INTRAVENOUS at 06:58

## 2024-05-10 RX ADMIN — SODIUM CHLORIDE 3000 MG: 900 INJECTION INTRAVENOUS at 08:16

## 2024-05-10 RX ADMIN — ONDANSETRON 4 MG: 2 INJECTION INTRAMUSCULAR; INTRAVENOUS at 08:45

## 2024-05-10 RX ADMIN — PROPOFOL 100 MG: 10 INJECTION, EMULSION INTRAVENOUS at 08:28

## 2024-05-10 RX ADMIN — PROPOFOL 30 MG: 10 INJECTION, EMULSION INTRAVENOUS at 08:32

## 2024-05-10 RX ADMIN — PROPOFOL 100 MG: 10 INJECTION, EMULSION INTRAVENOUS at 08:30

## 2024-05-10 ASSESSMENT — PAIN DESCRIPTION - DESCRIPTORS: DESCRIPTORS: ACHING

## 2024-05-10 ASSESSMENT — PAIN - FUNCTIONAL ASSESSMENT
PAIN_FUNCTIONAL_ASSESSMENT: PREVENTS OR INTERFERES SOME ACTIVE ACTIVITIES AND ADLS
PAIN_FUNCTIONAL_ASSESSMENT: 0-10

## 2024-05-10 NOTE — OP NOTE
PROCEDURE NOTE    DATE OF PROCEDURE: 5/10/2024     SURGEON: Cristhian Anand II, MD , FACS    ASSISTANT: None    PREOPERATIVE DIAGNOSIS:  1. Morbid obesity with planned bariatric surgery    POSTOPERATIVE DIAGNOSIS:  1. Superficial gastritis, biopsied    OPERATION: Esophagogastroduodenoscopy with biopsies    ANESTHESIA: Local monitored anesthesia    ESTIMATED BLOOD LOSS: None    COMPLICATIONS: None apparent    SPECIMENS: were obtained    HISTORY: The patient is a 49 y.o. year old female with history of the above preoperative diagnosis. I recommended esophagogastroduodenoscopy with possible biopsy and I explained the risk, benefits, expected outcome, and alternatives to the procedure.      Risks included but are not limited to bleeding, infection, respiratory distress, hypotension, and perforation of the esophagus, stomach, or duodenum. Patient understands and is in agreement, wishing to proceed.    PROCEDURE: The patient was brought into the endoscopy suite and the appropriate monitors were connected to the patient.    A timeout was held with all members of the procedure team present and in agreement.    The patient was positioned in the left lateral decubitus position with a bite block in place.     The endoscope was inserted into the patient's mouth and advanced from the oropharynx into the hypopharynx without difficulty and under direct vision. The scope was then advanced through the patient's esophagus under direct vision into the stomach, pylorus, and duodenum. A retroflexed view of the gastroesophageal junction was performed. Biopsies were taken. A summary of the findings are as follows:      Duodenum:     Descending: normal    Bulb: normal    Stomach:    Antrum: abnormal: areas of superficial gastritis, biopsied     Body: normal    Fundus: normal    Esophagus: normal, GEJ ~ 30 cm from incisors    Larynx: normal    The stomach was desufflated and the endoscope was removed. The patient tolerated the procedure

## 2024-05-10 NOTE — DISCHARGE INSTRUCTIONS
EGD    ___Kika_______________________________    OFFICE [facility]__[phone]_________________    FOLLOW UP APPOINTMENT  AS SCHEDULED.    REPEAT PROCEDURE AS  NEEDED.    TEST ORDERED:NONE     What to Expect at Home  Your Recovery:  The only discomfort after your EGD is generally limited to a mild soreness of the throat, which may last a day or two. Call your physician immediately if you have severe chest pain, shortness of breath or a temperature of 100 degrees or higher if taken orally.    How can you care for yourself at home?  Activity  Rest as much as you need to after you go home.  You should be able to go back to your usual activities the day after the test.  Diet  Follow your doctor's directions for eating after the test.  Drink plenty of fluids (unless your doctor has told you not to).  Medications  If you have a sore throat the day after the test, use an over-the-counter spray to numb your throat.  Your doctor will tell you if and when you can restart your medicines. He or she will also give you instructions about taking any new medicines.  If you take blood thinners, such as warfarin (Coumadin), clopidogrel (Plavix), or aspirin, be sure to talk to your doctor. He or she will tell you if and when to start taking those medicines again. Make sure that you understand exactly what your doctor wants you to do.  If a biopsy was done during the test, your doctor may tell you not to take aspirin or other anti-inflammatory medicines for a few days. These include ibuprofen (Advil, Motrin) and naproxen (Aleve).  DO NOT DRINK ANYTHING WITH ALCOHOL TODAY.    Other instructions:Anesthesia  For your safety, do not drive or operate machinery for 24 hours.   Do not sign legal documents or make major decisions for 24 hours. The anesthesia can make it hard for you to fully understand what you are agreeing to.      Follow-up care is a key part of your treatment and safety. Be sure to make and go to all appointments, and

## 2024-05-10 NOTE — PROGRESS NOTES
0850 Pt received from zAeb and report received from Radha JAIN. Pt c/o nausea. Family to bedside. Call light in reach. Pt abdomen soft and non tender. 0907 In to check on pt. Pt states that nausea is better. Family at bedside. Call light in reach. Pt given diet Starry and pancho crackers. 0913 Discharge instructions given to pa and . Both verbalized understanding of instructions. Pt denies needs. Call light in reach. 0930 Pt up to restroom. Pt tolerated well and ready to get dressed to go home. Family at bedside. Call light in reach. Pt nausea gone. Pt denies needs. 0940 Family to get car. 0945 Pt discharged to home per wheelchair via [facility].

## 2024-05-10 NOTE — H&P
History and Physical Update    Original H&P done in office on 4/29/24 (less than 30 days ago).    Pt reports the following changes in health since being seen last:    None    Vitals:    05/10/24 0650   BP: 118/62   Pulse: 86   Resp: 16   Temp: 97 °F (36.1 °C)   SpO2: 97%       Alert and oriented x 3, no apparent distress at rest  Atraumatic, normocephalic.  EOMI.  Breathing unlabored.  RRR.  Soft, non-tender, non-distended.  Moves all extremities, right total knee replacement 6 weeks ago with some tenderness  Warm, dry.     -Consent obtained in office.  -Abx ordered per Dr. Isaac requested due to recent knee replacement and EGD, Ancef ordered 3G for a weight of 250lb  -Reviewed expected pre-operative, operative, and post-operative courses.  -Answered questions to patient's satisfaction.   -Reviewed risks, benefits, alternative to procedure.   -Proceed as scheduled.    Karen Felipe, JOSÉ - CNP

## 2024-05-14 NOTE — ANESTHESIA POSTPROCEDURE EVALUATION
Department of Anesthesiology  Postprocedure Note    Patient: Noa Clark  MRN: [id number]  YOB: 1975  Date of evaluation: 5/14/2024    Procedure Summary       Date: 05/10/24 Room / Location: [place] [address] / [facility]    Anesthesia Start: 0821 Anesthesia Stop: 0839    Procedure: ESOPHAGOGASTRODUODENOSCOPY BIOPSY Diagnosis:       Pre-operative clearance      (Pre-operative clearance [Z01.818])    Surgeons: Cristhian Anand II, MD Responsible Provider: Ugo Parker MD    Anesthesia Type: MAC ASA Status: 2            Anesthesia Type: MAC    Reyes Phase I: Reyes Score: 10    Reyes Phase II: Reyes Score: 10    Anesthesia Post Evaluation    Patient location during evaluation: floor  Patient participation: complete - patient participated  Level of consciousness: sleepy but conscious  Pain score: 1  Airway patency: patent  Nausea & Vomiting: no nausea and no vomiting  Cardiovascular status: hemodynamically stable  Respiratory status: acceptable  Hydration status: euvolemic  Pain management: adequate    No notable events documented.

## 2024-05-16 ENCOUNTER — OFFICE VISIT (OUTPATIENT)
Dept: BARIATRICS/WEIGHT MGMT | Age: 49
End: 2024-05-16
Payer: COMMERCIAL

## 2024-05-16 VITALS
BODY MASS INDEX: 46.33 KG/M2 | OXYGEN SATURATION: 99 % | SYSTOLIC BLOOD PRESSURE: 116 MMHG | HEIGHT: 61 IN | DIASTOLIC BLOOD PRESSURE: 84 MMHG | WEIGHT: 245.4 LBS | HEART RATE: 85 BPM

## 2024-05-16 DIAGNOSIS — E66.01 MORBID OBESITY WITH BMI OF 45.0-49.9, ADULT (HCC): ICD-10-CM

## 2024-05-16 DIAGNOSIS — Z01.818 PRE-OPERATIVE CLEARANCE: Primary | ICD-10-CM

## 2024-05-16 DIAGNOSIS — I10 ESSENTIAL HYPERTENSION: ICD-10-CM

## 2024-05-16 PROCEDURE — 1036F TOBACCO NON-USER: CPT | Performed by: SURGERY

## 2024-05-16 PROCEDURE — 3074F SYST BP LT 130 MM HG: CPT | Performed by: SURGERY

## 2024-05-16 PROCEDURE — G8427 DOCREV CUR MEDS BY ELIG CLIN: HCPCS | Performed by: SURGERY

## 2024-05-16 PROCEDURE — G8417 CALC BMI ABV UP PARAM F/U: HCPCS | Performed by: SURGERY

## 2024-05-16 PROCEDURE — 99214 OFFICE O/P EST MOD 30 MIN: CPT | Performed by: SURGERY

## 2024-05-16 PROCEDURE — 3079F DIAST BP 80-89 MM HG: CPT | Performed by: SURGERY

## 2024-05-16 RX ORDER — OXYCODONE HYDROCHLORIDE AND ACETAMINOPHEN 5; 325 MG/1; MG/1
1 TABLET ORAL EVERY 4 HOURS PRN
COMMUNITY

## 2024-05-16 NOTE — PROGRESS NOTES
Complicated migraine    Varicose veins of lower extremities with complications, right    Status post phlebectomy    Pre-operative clearance       Past Surgical History:   Procedure Laterality Date    BREAST REDUCTION SURGERY Bilateral 2007    HYSTERECTOMY (CERVIX STATUS UNKNOWN)  2007    KNEE ARTHROSCOPY Right 5/24/2022    RIGHT KNEE ARTHROSCOPY WITH CHONDROPLASTY performed by Randell Isaac DO at [facility] OR    THYROIDECTOMY, PARTIAL      1992    TOTAL KNEE ARTHROPLASTY Right 3/28/2024    RIGHT KNEE TOTAL ARTHROPLASTY performed by Randell Isaac DO at [facility] OR    TYMPANOSTOMY TUBE PLACEMENT      As a child up till 1992    UPPER GASTROINTESTINAL ENDOSCOPY N/A 5/10/2024    ESOPHAGOGASTRODUODENOSCOPY BIOPSY performed by Cristhian Anand II, MD at [facility] ENDOSCOPY       Current Outpatient Medications   Medication Sig Dispense Refill    oxyCODONE-acetaminophen (PERCOCET) 5-325 MG per tablet Take 1 tablet by mouth every 4 hours as needed for Pain. Max Daily Amount: 6 tablets      losartan (COZAAR) 25 MG tablet TAKE 1 TABLET BY MOUTH DAILY 90 tablet 2    methocarbamol (ROBAXIN-750) 750 MG tablet Take 1 tablet by mouth 3 times daily 90 tablet 0    tiZANidine (ZANAFLEX) 4 MG tablet Take 1 tablet by mouth every 8 hours as needed (spasms) 30 tablet 0    buPROPion (WELLBUTRIN) 100 MG tablet Take 1 tablet by mouth 2 times daily      levothyroxine (SYNTHROID) 25 MCG tablet Take 1 tablet by mouth Daily      amLODIPine (NORVASC) 5 MG tablet Take 1.5 tablets by mouth daily Taking 1.5 tablets daily 135 tablet 2    atorvastatin (LIPITOR) 80 MG tablet Take 1 tablet by mouth daily 30 tablet 0    QUEtiapine (SEROQUEL) 50 MG tablet       nitroGLYCERIN (NITROSTAT) 0.4 MG SL tablet Place 1 tablet under the tongue every 5 minutes as needed for Chest pain 25 tablet 3    zolpidem (AMBIEN) 5 MG tablet Take 1 tablet by mouth nightly as needed for Sleep.      busPIRone (BUSPAR) 10 MG tablet Take 1 tablet by mouth in the morning and at bedtime

## 2024-05-23 RX ORDER — CELECOXIB 100 MG/1
100 CAPSULE ORAL 2 TIMES DAILY
Qty: 60 CAPSULE | Refills: 3 | Status: SHIPPED | OUTPATIENT
Start: 2024-05-23 | End: 2024-09-20

## 2024-05-30 PROBLEM — Z01.818 PRE-OPERATIVE CLEARANCE: Status: RESOLVED | Noted: 2024-04-30 | Resolved: 2024-05-30

## 2024-06-05 ENCOUNTER — HOSPITAL ENCOUNTER (OUTPATIENT)
Age: 49
Discharge: HOME | End: 2024-06-05
Payer: COMMERCIAL

## 2024-06-05 DIAGNOSIS — Z01.818 PRE-OPERATIVE CLEARANCE: ICD-10-CM

## 2024-06-05 LAB
AMPHETAMINES: NEGATIVE
BARBITURATE SCREEN URINE: NEGATIVE
BENZODIAZEPINE SCREEN, URINE: ABNORMAL
CANNABINOID SCREEN URINE: ABNORMAL
COCAINE METABOLITE: NEGATIVE
FENTANYL URINE: NEGATIVE
OPIATES, URINE: NEGATIVE
OXYCODONE: ABNORMAL

## 2024-06-05 PROCEDURE — G0480 DRUG TEST DEF 1-7 CLASSES: HCPCS

## 2024-06-05 PROCEDURE — 80307 DRUG TEST PRSMV CHEM ANLYZR: CPT

## 2024-06-08 LAB
ANABASINE UR-MCNC: <5 NG/ML
COTININE, URINE: <15 NG/ML
NICOTINE AND METABOLITES: <15 NG/ML
TRANS-3-OH-COTININE UR-MCNC: <50 NG/ML

## 2024-06-19 ENCOUNTER — OFFICE VISIT (OUTPATIENT)
Dept: ORTHOPEDIC SURGERY | Age: 49
End: 2024-06-19

## 2024-06-19 VITALS
BODY MASS INDEX: 46.26 KG/M2 | RESPIRATION RATE: 14 BRPM | HEIGHT: 61 IN | OXYGEN SATURATION: 99 % | WEIGHT: 245 LBS | HEART RATE: 79 BPM

## 2024-06-19 DIAGNOSIS — Z96.651 STATUS POST RIGHT KNEE REPLACEMENT: Primary | ICD-10-CM

## 2024-06-19 PROCEDURE — 99024 POSTOP FOLLOW-UP VISIT: CPT | Performed by: ORTHOPAEDIC SURGERY

## 2024-06-19 ASSESSMENT — ENCOUNTER SYMPTOMS
CHEST TIGHTNESS: 0
BACK PAIN: 0
ABDOMINAL PAIN: 0
EYE REDNESS: 0
COLOR CHANGE: 0

## 2024-06-19 NOTE — PATIENT INSTRUCTIONS
Patient may follow-up at his 1 year carri with Dr. Isaac.  Continue working on range of motion and strengthening as needed.  Call office with any questions.  For any dental procedures or other invasive medical procedures you will need to take antibiotics prior to your procedure.  Call our office prior to any dental procedure and we will send in a prescription for you.  Antibiotics for other medical procedures are usually taken care of by the doctor or clinician performing the procedure.    If you have any question post operatively. Please contact office to speak with Melissa Quinn.     We are committed to providing you the best care possible.  If you receive a survey after visiting one of our offices, please take time to share your experience concerning your physician office visit.  These surveys are confidential and no health information about you is shared.  We are eager to improve for you and we are counting on your feedback to help make that happen.

## 2024-06-19 NOTE — PROGRESS NOTES
continuing to progress really well  I discussed with the patient today that their are symptoms are normal and should improve with time.  Continue weight-bearing as tolerated.  Continue range of motion exercises as instructed.  Ice and elevate as needed.  Tylenol or Motrin for pain.  Follow up in 9 months for annual recheck with x-rays of the right knee.            TORIE MARTINEZ, DO

## 2024-07-24 RX ORDER — LOSARTAN POTASSIUM 25 MG/1
25 TABLET ORAL DAILY
Qty: 90 TABLET | Refills: 1 | Status: SHIPPED | OUTPATIENT
Start: 2024-07-24 | End: 2025-04-20

## 2024-08-05 RX ORDER — AMLODIPINE BESYLATE 5 MG/1
TABLET ORAL
Qty: 135 TABLET | Refills: 2 | Status: SHIPPED | OUTPATIENT
Start: 2024-08-05

## 2024-08-19 RX ORDER — AMLODIPINE BESYLATE 5 MG/1
TABLET ORAL
Qty: 135 TABLET | Refills: 2 | OUTPATIENT
Start: 2024-08-19

## 2024-08-26 ENCOUNTER — OFFICE VISIT (OUTPATIENT)
Dept: BARIATRICS/WEIGHT MGMT | Age: 49
End: 2024-08-26
Payer: COMMERCIAL

## 2024-08-26 VITALS
OXYGEN SATURATION: 97 % | BODY MASS INDEX: 46.33 KG/M2 | HEART RATE: 61 BPM | WEIGHT: 245.4 LBS | HEIGHT: 61 IN | SYSTOLIC BLOOD PRESSURE: 102 MMHG | DIASTOLIC BLOOD PRESSURE: 70 MMHG

## 2024-08-26 DIAGNOSIS — I10 ESSENTIAL HYPERTENSION: ICD-10-CM

## 2024-08-26 DIAGNOSIS — E66.01 MORBID OBESITY WITH BMI OF 45.0-49.9, ADULT (HCC): Primary | ICD-10-CM

## 2024-08-26 DIAGNOSIS — Z01.818 PRE-OP EVALUATION: ICD-10-CM

## 2024-08-26 DIAGNOSIS — E78.00 ELEVATED LOW DENSITY LIPOPROTEIN (LDL) CHOLESTEROL LEVEL: ICD-10-CM

## 2024-08-26 LAB
ALCOHOL URINE: ABNORMAL
AMPHETAMINE SCREEN URINE: ABNORMAL
BARBITURATE SCREEN URINE: NEGATIVE
BENZODIAZEPINE SCREEN, URINE: NEGATIVE
BUPRENORPHINE URINE: NEGATIVE
COCAINE METABOLITE SCREEN URINE: NEGATIVE
FENTANYL SCREEN, URINE: ABNORMAL
GABAPENTIN SCREEN, URINE: ABNORMAL
MDMA, URINE: NEGATIVE
METHADONE SCREEN, URINE: ABNORMAL
METHAMPHETAMINE, URINE: NEGATIVE
OPIATE SCREEN URINE: NEGATIVE
OXYCODONE SCREEN URINE: NEGATIVE
PHENCYCLIDINE SCREEN URINE: NEGATIVE
PROPOXYPHENE SCREEN, URINE: NEGATIVE
SYNTHETIC CANNABINOIDS(K2) SCREEN, URINE: ABNORMAL
THC SCREEN, URINE: POSITIVE
TRAMADOL SCREEN URINE: ABNORMAL
TRICYCLIC ANTIDEPRESSANTS, UR: NEGATIVE

## 2024-08-26 PROCEDURE — 99213 OFFICE O/P EST LOW 20 MIN: CPT | Performed by: NURSE PRACTITIONER

## 2024-08-26 PROCEDURE — G8417 CALC BMI ABV UP PARAM F/U: HCPCS | Performed by: NURSE PRACTITIONER

## 2024-08-26 PROCEDURE — 3078F DIAST BP <80 MM HG: CPT | Performed by: NURSE PRACTITIONER

## 2024-08-26 PROCEDURE — 80305 DRUG TEST PRSMV DIR OPT OBS: CPT | Performed by: NURSE PRACTITIONER

## 2024-08-26 PROCEDURE — 3074F SYST BP LT 130 MM HG: CPT | Performed by: NURSE PRACTITIONER

## 2024-08-26 PROCEDURE — G8427 DOCREV CUR MEDS BY ELIG CLIN: HCPCS | Performed by: NURSE PRACTITIONER

## 2024-08-26 PROCEDURE — 1036F TOBACCO NON-USER: CPT | Performed by: NURSE PRACTITIONER

## 2024-08-26 RX ORDER — ROPINIROLE 0.25 MG/1
0.5 TABLET, FILM COATED ORAL NIGHTLY
COMMUNITY
Start: 2024-08-21

## 2024-08-26 NOTE — PROGRESS NOTES
BARIATRIC SURGERY OFFICE PROGRESS NOTE    SUBJECTIVE:    Patient presenting today referred from Tesha Lambert MD, for   Chief Complaint   Patient presents with    Weight Management     6th Surg WM Visit +Card, +Pulm, +Psych  Has Bar Cov   .    Vitals:    08/26/24 1034   BP: 102/70   Pulse: 61   SpO2: 97%        BMI: Body mass index is 46.37 kg/m².     Weight History:   Wt Readings from Last 3 Encounters:   08/26/24 111.3 kg (245 lb 6.4 oz)   06/19/24 111.1 kg (245 lb)   05/16/24 111.3 kg (245 lb 6.4 oz)         HPI:Noa Clark is a 49 y.o. female presenting in sixth bariatric PRE-OP visit    Diet Recall:    Total weight loss/gain: -0 lbs since last visit, and -9.2 lbs since starting program     Protein: tracking calories about 800 daily  Water Intake: good  Exercise: walking on treadmill several times a week   New health problems: denies  New medications: denies  Clearances:  -- Cardiology: cleared 2/15/2024   -- Pulmonology: cleared 3/21/2024  -- Psychology: cleared 3/18/2024    Thoroughly reviewed the patient's medical history, family history, social history and review of systems with the patient today in the office.  Please see medical record for pertinent positives.      Past Medical History:   Diagnosis Date    Anxiety     Depression     GERD (gastroesophageal reflux disease)     H/O echocardiogram 10/19/2023    Ef 55-60%. Grade I diastolic dysfunction. Normal left ventricular size and Mild-to-moderate pulmonic regurgitation present    Headache     migraines    Hx of exercise stress test 10/24/2023    Exercise tolerance is good. Exercised for 6:00 min on Gomez protocol achieving workload of 7.00 METS.    Hyperlipidemia     Osteoarthritis of right knee     Palpitation     PONV (postoperative nausea and vomiting)     Thyroid disease     Vitamin D deficiency         Patient Active Problem List   Diagnosis    TIA (transient ischemic attack)    Paresthesia of tongue    Migraine headache without aura

## 2024-08-27 ENCOUNTER — TELEPHONE (OUTPATIENT)
Dept: BARIATRICS/WEIGHT MGMT | Age: 49
End: 2024-08-27

## 2024-08-27 NOTE — TELEPHONE ENCOUNTER
Called patient advised unfortunately insurance will require an up to date marijuana card. She will get this updated in the next couple days and bring us a new copy.

## 2024-09-03 RX ORDER — CELECOXIB 100 MG/1
100 CAPSULE ORAL 2 TIMES DAILY
Qty: 60 CAPSULE | Refills: 3 | Status: SHIPPED | OUTPATIENT
Start: 2024-09-03 | End: 2025-01-01

## 2024-09-11 ENCOUNTER — TELEPHONE (OUTPATIENT)
Dept: BARIATRICS/WEIGHT MGMT | Age: 49
End: 2024-09-11

## 2024-09-11 ENCOUNTER — PREP FOR PROCEDURE (OUTPATIENT)
Dept: BARIATRICS/WEIGHT MGMT | Age: 49
End: 2024-09-11

## 2024-09-11 DIAGNOSIS — E66.01 MORBID OBESITY: ICD-10-CM

## 2024-09-12 ENCOUNTER — OFFICE VISIT (OUTPATIENT)
Dept: BARIATRICS/WEIGHT MGMT | Age: 49
End: 2024-09-12

## 2024-09-12 VITALS — BODY MASS INDEX: 45.8 KG/M2 | WEIGHT: 242.6 LBS | HEIGHT: 61 IN

## 2024-09-17 ENCOUNTER — OFFICE VISIT (OUTPATIENT)
Dept: BARIATRICS/WEIGHT MGMT | Age: 49
End: 2024-09-17
Payer: COMMERCIAL

## 2024-09-17 VITALS
OXYGEN SATURATION: 96 % | DIASTOLIC BLOOD PRESSURE: 70 MMHG | SYSTOLIC BLOOD PRESSURE: 110 MMHG | BODY MASS INDEX: 45.37 KG/M2 | HEART RATE: 92 BPM | WEIGHT: 240.3 LBS | HEIGHT: 61 IN

## 2024-09-17 DIAGNOSIS — Z01.818 PRE-OPERATIVE CLEARANCE: ICD-10-CM

## 2024-09-17 DIAGNOSIS — E66.01 MORBID OBESITY WITH BMI OF 45.0-49.9, ADULT (HCC): Primary | ICD-10-CM

## 2024-09-17 PROCEDURE — G8427 DOCREV CUR MEDS BY ELIG CLIN: HCPCS | Performed by: SURGERY

## 2024-09-17 PROCEDURE — 1036F TOBACCO NON-USER: CPT | Performed by: SURGERY

## 2024-09-17 PROCEDURE — G8417 CALC BMI ABV UP PARAM F/U: HCPCS | Performed by: SURGERY

## 2024-09-17 PROCEDURE — 99214 OFFICE O/P EST MOD 30 MIN: CPT | Performed by: SURGERY

## 2024-09-17 RX ORDER — SODIUM CHLORIDE 0.9 % (FLUSH) 0.9 %
5-40 SYRINGE (ML) INJECTION PRN
OUTPATIENT
Start: 2024-09-17

## 2024-09-17 RX ORDER — SODIUM CHLORIDE 9 MG/ML
INJECTION, SOLUTION INTRAVENOUS PRN
OUTPATIENT
Start: 2024-09-17

## 2024-09-17 RX ORDER — ROPINIROLE 1 MG/1
1 TABLET, FILM COATED ORAL NIGHTLY
COMMUNITY
Start: 2024-09-05

## 2024-09-17 RX ORDER — ACETAMINOPHEN 325 MG/1
650 TABLET ORAL ONCE
OUTPATIENT
Start: 2024-09-17 | End: 2024-09-17

## 2024-09-17 RX ORDER — SCOLOPAMINE TRANSDERMAL SYSTEM 1 MG/1
1 PATCH, EXTENDED RELEASE TRANSDERMAL ONCE
OUTPATIENT
Start: 2024-09-17 | End: 2024-09-17

## 2024-09-17 RX ORDER — HEPARIN SODIUM 5000 [USP'U]/ML
5000 INJECTION, SOLUTION INTRAVENOUS; SUBCUTANEOUS ONCE
OUTPATIENT
Start: 2024-09-17 | End: 2024-09-17

## 2024-09-17 RX ORDER — ONDANSETRON 2 MG/ML
4 INJECTION INTRAMUSCULAR; INTRAVENOUS ONCE
OUTPATIENT
Start: 2024-09-17 | End: 2024-09-17

## 2024-09-17 RX ORDER — SODIUM CHLORIDE 0.9 % (FLUSH) 0.9 %
5-40 SYRINGE (ML) INJECTION EVERY 12 HOURS SCHEDULED
OUTPATIENT
Start: 2024-09-17

## 2024-09-17 RX ORDER — SODIUM CHLORIDE, SODIUM LACTATE, POTASSIUM CHLORIDE, CALCIUM CHLORIDE 600; 310; 30; 20 MG/100ML; MG/100ML; MG/100ML; MG/100ML
INJECTION, SOLUTION INTRAVENOUS CONTINUOUS
OUTPATIENT
Start: 2024-09-17

## 2024-09-19 ENCOUNTER — HOSPITAL ENCOUNTER (OUTPATIENT)
Age: 49
Discharge: HOME | End: 2024-09-19
Payer: COMMERCIAL

## 2024-09-19 ENCOUNTER — OFFICE VISIT (OUTPATIENT)
Dept: BARIATRICS/WEIGHT MGMT | Age: 49
End: 2024-09-19
Payer: COMMERCIAL

## 2024-09-19 VITALS
HEIGHT: 61 IN | SYSTOLIC BLOOD PRESSURE: 110 MMHG | WEIGHT: 237.4 LBS | DIASTOLIC BLOOD PRESSURE: 80 MMHG | HEART RATE: 86 BPM | OXYGEN SATURATION: 97 % | BODY MASS INDEX: 44.82 KG/M2

## 2024-09-19 DIAGNOSIS — I10 ESSENTIAL HYPERTENSION: ICD-10-CM

## 2024-09-19 DIAGNOSIS — E78.00 ELEVATED LOW DENSITY LIPOPROTEIN (LDL) CHOLESTEROL LEVEL: ICD-10-CM

## 2024-09-19 DIAGNOSIS — Z01.818 PRE-OP EVALUATION: ICD-10-CM

## 2024-09-19 DIAGNOSIS — Z01.818 PRE-OPERATIVE CLEARANCE: ICD-10-CM

## 2024-09-19 DIAGNOSIS — E66.01 MORBID OBESITY WITH BMI OF 40.0-44.9, ADULT (HCC): Primary | ICD-10-CM

## 2024-09-19 LAB
ALBUMIN SERPL-MCNC: 4.5 G/DL (ref 3.4–5)
ALBUMIN/GLOB SERPL: 1.8 {RATIO} (ref 1.1–2.2)
ALP SERPL-CCNC: 91 U/L (ref 40–129)
ALT SERPL-CCNC: 16 U/L (ref 10–40)
ANION GAP SERPL CALCULATED.3IONS-SCNC: 13 MMOL/L (ref 9–17)
AST SERPL-CCNC: 23 U/L (ref 15–37)
BILIRUB SERPL-MCNC: 0.9 MG/DL (ref 0–1)
BUN SERPL-MCNC: 24 MG/DL (ref 7–20)
CALCIUM SERPL-MCNC: 9.6 MG/DL (ref 8.3–10.6)
CHLORIDE SERPL-SCNC: 101 MMOL/L (ref 99–110)
CO2 SERPL-SCNC: 26 MMOL/L (ref 21–32)
CREAT SERPL-MCNC: 0.8 MG/DL (ref 0.6–1.1)
EST. AVERAGE GLUCOSE BLD GHB EST-MCNC: 113 MG/DL
GFR, ESTIMATED: 78 ML/MIN/1.73M2
GLUCOSE SERPL-MCNC: 79 MG/DL (ref 74–99)
HBA1C MFR BLD: 5.6 % (ref 4.2–6.3)
POTASSIUM SERPL-SCNC: 4.6 MMOL/L (ref 3.5–5.1)
PROT SERPL-MCNC: 7.1 G/DL (ref 6.4–8.2)
SODIUM SERPL-SCNC: 140 MMOL/L (ref 136–145)

## 2024-09-19 PROCEDURE — 80053 COMPREHEN METABOLIC PANEL: CPT

## 2024-09-19 PROCEDURE — 3079F DIAST BP 80-89 MM HG: CPT | Performed by: NURSE PRACTITIONER

## 2024-09-19 PROCEDURE — 99214 OFFICE O/P EST MOD 30 MIN: CPT | Performed by: NURSE PRACTITIONER

## 2024-09-19 PROCEDURE — G8417 CALC BMI ABV UP PARAM F/U: HCPCS | Performed by: NURSE PRACTITIONER

## 2024-09-19 PROCEDURE — G8427 DOCREV CUR MEDS BY ELIG CLIN: HCPCS | Performed by: NURSE PRACTITIONER

## 2024-09-19 PROCEDURE — 1036F TOBACCO NON-USER: CPT | Performed by: NURSE PRACTITIONER

## 2024-09-19 PROCEDURE — 36415 COLL VENOUS BLD VENIPUNCTURE: CPT

## 2024-09-19 PROCEDURE — 83036 HEMOGLOBIN GLYCOSYLATED A1C: CPT

## 2024-09-19 PROCEDURE — 3074F SYST BP LT 130 MM HG: CPT | Performed by: NURSE PRACTITIONER

## 2024-09-19 RX ORDER — ASPIRIN 81 MG/1
81 TABLET ORAL DAILY
Status: ON HOLD | COMMUNITY
End: 2024-09-24

## 2024-09-19 ASSESSMENT — ENCOUNTER SYMPTOMS: CONSTIPATION: 1

## 2024-09-23 ENCOUNTER — ANESTHESIA (OUTPATIENT)
Dept: OPERATING ROOM | Age: 49
End: 2024-09-23
Payer: COMMERCIAL

## 2024-09-23 ENCOUNTER — ANESTHESIA EVENT (OUTPATIENT)
Dept: OPERATING ROOM | Age: 49
End: 2024-09-23
Payer: COMMERCIAL

## 2024-09-23 ENCOUNTER — HOSPITAL ENCOUNTER (OUTPATIENT)
Age: 49
Setting detail: OBSERVATION
Discharge: HOME | End: 2024-09-24
Attending: SURGERY | Admitting: SURGERY
Payer: COMMERCIAL

## 2024-09-23 DIAGNOSIS — E66.01 MORBID OBESITY: ICD-10-CM

## 2024-09-23 DIAGNOSIS — Z98.84 S/P LAPAROSCOPIC SLEEVE GASTRECTOMY: Primary | ICD-10-CM

## 2024-09-23 PROCEDURE — 96372 THER/PROPH/DIAG INJ SC/IM: CPT

## 2024-09-23 PROCEDURE — 6360000002 HC RX W HCPCS: Performed by: SURGERY

## 2024-09-23 PROCEDURE — 6360000002 HC RX W HCPCS: Performed by: NURSE ANESTHETIST, CERTIFIED REGISTERED

## 2024-09-23 PROCEDURE — 88307 TISSUE EXAM BY PATHOLOGIST: CPT

## 2024-09-23 PROCEDURE — 2500000003 HC RX 250 WO HCPCS: Performed by: NURSE ANESTHETIST, CERTIFIED REGISTERED

## 2024-09-23 PROCEDURE — 3700000000 HC ANESTHESIA ATTENDED CARE: Performed by: SURGERY

## 2024-09-23 PROCEDURE — 6370000000 HC RX 637 (ALT 250 FOR IP): Performed by: PREVENTIVE MEDICINE

## 2024-09-23 PROCEDURE — 2720000010 HC SURG SUPPLY STERILE: Performed by: SURGERY

## 2024-09-23 PROCEDURE — 94761 N-INVAS EAR/PLS OXIMETRY MLT: CPT

## 2024-09-23 PROCEDURE — 3600000009 HC SURGERY ROBOT BASE: Performed by: SURGERY

## 2024-09-23 PROCEDURE — 2709999900 HC NON-CHARGEABLE SUPPLY: Performed by: SURGERY

## 2024-09-23 PROCEDURE — S2900 ROBOTIC SURGICAL SYSTEM: HCPCS | Performed by: SURGERY

## 2024-09-23 PROCEDURE — G0378 HOSPITAL OBSERVATION PER HR: HCPCS

## 2024-09-23 PROCEDURE — APPNB180 APP NON BILLABLE TIME > 60 MINS: Performed by: NURSE PRACTITIONER

## 2024-09-23 PROCEDURE — 6370000000 HC RX 637 (ALT 250 FOR IP): Performed by: SURGERY

## 2024-09-23 PROCEDURE — 6370000000 HC RX 637 (ALT 250 FOR IP): Performed by: STUDENT IN AN ORGANIZED HEALTH CARE EDUCATION/TRAINING PROGRAM

## 2024-09-23 PROCEDURE — 43775 LAP SLEEVE GASTRECTOMY: CPT | Performed by: SURGERY

## 2024-09-23 PROCEDURE — 3600000019 HC SURGERY ROBOT ADDTL 15MIN: Performed by: SURGERY

## 2024-09-23 PROCEDURE — 2580000003 HC RX 258: Performed by: SURGERY

## 2024-09-23 PROCEDURE — 7100000000 HC PACU RECOVERY - FIRST 15 MIN: Performed by: SURGERY

## 2024-09-23 PROCEDURE — 3700000001 HC ADD 15 MINUTES (ANESTHESIA): Performed by: SURGERY

## 2024-09-23 PROCEDURE — 2580000003 HC RX 258

## 2024-09-23 PROCEDURE — 7100000001 HC PACU RECOVERY - ADDTL 15 MIN: Performed by: SURGERY

## 2024-09-23 PROCEDURE — 6360000002 HC RX W HCPCS: Performed by: ANESTHESIOLOGY

## 2024-09-23 PROCEDURE — 43775 LAP SLEEVE GASTRECTOMY: CPT | Performed by: NURSE PRACTITIONER

## 2024-09-23 PROCEDURE — 2500000003 HC RX 250 WO HCPCS: Performed by: SURGERY

## 2024-09-23 PROCEDURE — 94150 VITAL CAPACITY TEST: CPT

## 2024-09-23 RX ORDER — QUETIAPINE FUMARATE 25 MG/1
50 TABLET, FILM COATED ORAL NIGHTLY
Status: DISCONTINUED | OUTPATIENT
Start: 2024-09-23 | End: 2024-09-24 | Stop reason: HOSPADM

## 2024-09-23 RX ORDER — METOCLOPRAMIDE HYDROCHLORIDE 5 MG/ML
10 INJECTION INTRAMUSCULAR; INTRAVENOUS EVERY 6 HOURS PRN
Status: DISCONTINUED | OUTPATIENT
Start: 2024-09-23 | End: 2024-09-24 | Stop reason: HOSPADM

## 2024-09-23 RX ORDER — ACETAMINOPHEN 325 MG/1
325 TABLET ORAL EVERY 4 HOURS
Status: DISCONTINUED | OUTPATIENT
Start: 2024-09-23 | End: 2024-09-24 | Stop reason: HOSPADM

## 2024-09-23 RX ORDER — HYDRALAZINE HYDROCHLORIDE 20 MG/ML
10 INJECTION INTRAMUSCULAR; INTRAVENOUS
Status: DISCONTINUED | OUTPATIENT
Start: 2024-09-23 | End: 2024-09-23 | Stop reason: HOSPADM

## 2024-09-23 RX ORDER — LOSARTAN POTASSIUM 25 MG/1
25 TABLET ORAL NIGHTLY
Status: DISCONTINUED | OUTPATIENT
Start: 2024-09-23 | End: 2024-09-24 | Stop reason: HOSPADM

## 2024-09-23 RX ORDER — MEPERIDINE HYDROCHLORIDE 25 MG/ML
12.5 INJECTION INTRAMUSCULAR; INTRAVENOUS; SUBCUTANEOUS EVERY 5 MIN PRN
Status: DISCONTINUED | OUTPATIENT
Start: 2024-09-23 | End: 2024-09-23 | Stop reason: HOSPADM

## 2024-09-23 RX ORDER — SODIUM CHLORIDE 0.9 % (FLUSH) 0.9 %
5-40 SYRINGE (ML) INJECTION EVERY 12 HOURS SCHEDULED
Status: DISCONTINUED | OUTPATIENT
Start: 2024-09-23 | End: 2024-09-23 | Stop reason: HOSPADM

## 2024-09-23 RX ORDER — PROPOFOL 10 MG/ML
INJECTION, EMULSION INTRAVENOUS
Status: DISCONTINUED | OUTPATIENT
Start: 2024-09-23 | End: 2024-09-23 | Stop reason: SDUPTHER

## 2024-09-23 RX ORDER — NALOXONE HYDROCHLORIDE 0.4 MG/ML
INJECTION, SOLUTION INTRAMUSCULAR; INTRAVENOUS; SUBCUTANEOUS PRN
Status: DISCONTINUED | OUTPATIENT
Start: 2024-09-23 | End: 2024-09-23 | Stop reason: HOSPADM

## 2024-09-23 RX ORDER — DROPERIDOL 2.5 MG/ML
0.62 INJECTION, SOLUTION INTRAMUSCULAR; INTRAVENOUS
Status: COMPLETED | OUTPATIENT
Start: 2024-09-23 | End: 2024-09-23

## 2024-09-23 RX ORDER — ONDANSETRON 2 MG/ML
INJECTION INTRAMUSCULAR; INTRAVENOUS
Status: DISCONTINUED | OUTPATIENT
Start: 2024-09-23 | End: 2024-09-23 | Stop reason: SDUPTHER

## 2024-09-23 RX ORDER — SODIUM CHLORIDE 0.9 % (FLUSH) 0.9 %
5-40 SYRINGE (ML) INJECTION PRN
Status: DISCONTINUED | OUTPATIENT
Start: 2024-09-23 | End: 2024-09-23 | Stop reason: HOSPADM

## 2024-09-23 RX ORDER — SODIUM CHLORIDE 9 MG/ML
INJECTION, SOLUTION INTRAVENOUS PRN
Status: DISCONTINUED | OUTPATIENT
Start: 2024-09-23 | End: 2024-09-24 | Stop reason: HOSPADM

## 2024-09-23 RX ORDER — KETAMINE HYDROCHLORIDE 10 MG/ML
INJECTION, SOLUTION INTRAMUSCULAR; INTRAVENOUS
Status: DISCONTINUED | OUTPATIENT
Start: 2024-09-23 | End: 2024-09-23 | Stop reason: SDUPTHER

## 2024-09-23 RX ORDER — KETOROLAC TROMETHAMINE 30 MG/ML
30 INJECTION, SOLUTION INTRAMUSCULAR; INTRAVENOUS EVERY 6 HOURS
Status: DISCONTINUED | OUTPATIENT
Start: 2024-09-23 | End: 2024-09-24 | Stop reason: HOSPADM

## 2024-09-23 RX ORDER — ONDANSETRON 2 MG/ML
4 INJECTION INTRAMUSCULAR; INTRAVENOUS EVERY 6 HOURS PRN
Status: DISCONTINUED | OUTPATIENT
Start: 2024-09-23 | End: 2024-09-24 | Stop reason: HOSPADM

## 2024-09-23 RX ORDER — VASOPRESSIN 20 U/ML
INJECTION PARENTERAL
Status: DISCONTINUED | OUTPATIENT
Start: 2024-09-23 | End: 2024-09-23 | Stop reason: SDUPTHER

## 2024-09-23 RX ORDER — ROPINIROLE 1 MG/1
1 TABLET, FILM COATED ORAL NIGHTLY
Status: DISCONTINUED | OUTPATIENT
Start: 2024-09-23 | End: 2024-09-24 | Stop reason: HOSPADM

## 2024-09-23 RX ORDER — SCOLOPAMINE TRANSDERMAL SYSTEM 1 MG/1
1 PATCH, EXTENDED RELEASE TRANSDERMAL ONCE
Status: DISCONTINUED | OUTPATIENT
Start: 2024-09-23 | End: 2024-09-23

## 2024-09-23 RX ORDER — SODIUM CHLORIDE 0.9 % (FLUSH) 0.9 %
5-40 SYRINGE (ML) INJECTION EVERY 12 HOURS SCHEDULED
Status: DISCONTINUED | OUTPATIENT
Start: 2024-09-23 | End: 2024-09-24 | Stop reason: HOSPADM

## 2024-09-23 RX ORDER — LEVOTHYROXINE SODIUM 25 UG/1
25 TABLET ORAL DAILY
Status: DISCONTINUED | OUTPATIENT
Start: 2024-09-24 | End: 2024-09-24 | Stop reason: HOSPADM

## 2024-09-23 RX ORDER — ONDANSETRON 4 MG/1
4 TABLET, ORALLY DISINTEGRATING ORAL EVERY 8 HOURS PRN
Status: DISCONTINUED | OUTPATIENT
Start: 2024-09-23 | End: 2024-09-24 | Stop reason: HOSPADM

## 2024-09-23 RX ORDER — ESCITALOPRAM OXALATE 10 MG/1
20 TABLET ORAL DAILY
Status: DISCONTINUED | OUTPATIENT
Start: 2024-09-23 | End: 2024-09-24 | Stop reason: HOSPADM

## 2024-09-23 RX ORDER — FAMOTIDINE 10 MG/ML
20 INJECTION, SOLUTION INTRAVENOUS 2 TIMES DAILY
Status: DISCONTINUED | OUTPATIENT
Start: 2024-09-23 | End: 2024-09-24 | Stop reason: HOSPADM

## 2024-09-23 RX ORDER — HYDROCODONE BITARTRATE AND ACETAMINOPHEN 5; 325 MG/1; MG/1
1 TABLET ORAL EVERY 6 HOURS PRN
Status: DISCONTINUED | OUTPATIENT
Start: 2024-09-23 | End: 2024-09-24 | Stop reason: HOSPADM

## 2024-09-23 RX ORDER — OXYCODONE HYDROCHLORIDE 5 MG/1
5 TABLET ORAL
Status: DISCONTINUED | OUTPATIENT
Start: 2024-09-23 | End: 2024-09-23 | Stop reason: HOSPADM

## 2024-09-23 RX ORDER — SODIUM CHLORIDE 9 MG/ML
INJECTION, SOLUTION INTRAMUSCULAR; INTRAVENOUS; SUBCUTANEOUS
Status: COMPLETED
Start: 2024-09-23 | End: 2024-09-23

## 2024-09-23 RX ORDER — SODIUM CHLORIDE 0.9 % (FLUSH) 0.9 %
10 SYRINGE (ML) INJECTION PRN
Status: DISCONTINUED | OUTPATIENT
Start: 2024-09-23 | End: 2024-09-24 | Stop reason: HOSPADM

## 2024-09-23 RX ORDER — ENOXAPARIN SODIUM 100 MG/ML
40 INJECTION SUBCUTANEOUS EVERY 12 HOURS SCHEDULED
Status: DISCONTINUED | OUTPATIENT
Start: 2024-09-24 | End: 2024-09-24 | Stop reason: HOSPADM

## 2024-09-23 RX ORDER — DROPERIDOL 2.5 MG/ML
INJECTION, SOLUTION INTRAMUSCULAR; INTRAVENOUS
Status: COMPLETED
Start: 2024-09-23 | End: 2024-09-23

## 2024-09-23 RX ORDER — SODIUM CHLORIDE, SODIUM LACTATE, POTASSIUM CHLORIDE, CALCIUM CHLORIDE 600; 310; 30; 20 MG/100ML; MG/100ML; MG/100ML; MG/100ML
INJECTION, SOLUTION INTRAVENOUS CONTINUOUS
Status: DISCONTINUED | OUTPATIENT
Start: 2024-09-23 | End: 2024-09-24 | Stop reason: HOSPADM

## 2024-09-23 RX ORDER — SODIUM CHLORIDE 9 MG/ML
INJECTION, SOLUTION INTRAVENOUS PRN
Status: DISCONTINUED | OUTPATIENT
Start: 2024-09-23 | End: 2024-09-23 | Stop reason: HOSPADM

## 2024-09-23 RX ORDER — DEXAMETHASONE SODIUM PHOSPHATE 4 MG/ML
INJECTION, SOLUTION INTRA-ARTICULAR; INTRALESIONAL; INTRAMUSCULAR; INTRAVENOUS; SOFT TISSUE
Status: DISCONTINUED | OUTPATIENT
Start: 2024-09-23 | End: 2024-09-23 | Stop reason: SDUPTHER

## 2024-09-23 RX ORDER — AMLODIPINE BESYLATE 5 MG/1
5 TABLET ORAL DAILY
Status: DISCONTINUED | OUTPATIENT
Start: 2024-09-24 | End: 2024-09-24 | Stop reason: HOSPADM

## 2024-09-23 RX ORDER — BUPIVACAINE HYDROCHLORIDE 5 MG/ML
INJECTION, SOLUTION EPIDURAL; INTRACAUDAL
Status: COMPLETED | OUTPATIENT
Start: 2024-09-23 | End: 2024-09-23

## 2024-09-23 RX ORDER — ROCURONIUM BROMIDE 10 MG/ML
INJECTION, SOLUTION INTRAVENOUS
Status: DISCONTINUED | OUTPATIENT
Start: 2024-09-23 | End: 2024-09-23 | Stop reason: SDUPTHER

## 2024-09-23 RX ORDER — BUSPIRONE HYDROCHLORIDE 5 MG/1
10 TABLET ORAL 2 TIMES DAILY
Status: DISCONTINUED | OUTPATIENT
Start: 2024-09-23 | End: 2024-09-24 | Stop reason: HOSPADM

## 2024-09-23 RX ORDER — LOSARTAN POTASSIUM 25 MG/1
25 TABLET ORAL DAILY
Status: DISCONTINUED | OUTPATIENT
Start: 2024-09-24 | End: 2024-09-23

## 2024-09-23 RX ORDER — ACETAMINOPHEN 325 MG/1
650 TABLET ORAL ONCE
Status: COMPLETED | OUTPATIENT
Start: 2024-09-23 | End: 2024-09-23

## 2024-09-23 RX ORDER — ONDANSETRON 2 MG/ML
4 INJECTION INTRAMUSCULAR; INTRAVENOUS
Status: DISCONTINUED | OUTPATIENT
Start: 2024-09-23 | End: 2024-09-23 | Stop reason: HOSPADM

## 2024-09-23 RX ORDER — LABETALOL HYDROCHLORIDE 5 MG/ML
10 INJECTION, SOLUTION INTRAVENOUS
Status: DISCONTINUED | OUTPATIENT
Start: 2024-09-23 | End: 2024-09-23 | Stop reason: HOSPADM

## 2024-09-23 RX ORDER — PHENYLEPHRINE HCL IN 0.9% NACL 1 MG/10 ML
SYRINGE (ML) INTRAVENOUS
Status: DISCONTINUED | OUTPATIENT
Start: 2024-09-23 | End: 2024-09-23

## 2024-09-23 RX ORDER — MORPHINE SULFATE 2 MG/ML
2 INJECTION, SOLUTION INTRAMUSCULAR; INTRAVENOUS
Status: DISCONTINUED | OUTPATIENT
Start: 2024-09-23 | End: 2024-09-24 | Stop reason: HOSPADM

## 2024-09-23 RX ORDER — HEPARIN SODIUM 5000 [USP'U]/ML
5000 INJECTION, SOLUTION INTRAVENOUS; SUBCUTANEOUS ONCE
Status: COMPLETED | OUTPATIENT
Start: 2024-09-23 | End: 2024-09-23

## 2024-09-23 RX ORDER — POTASSIUM CHLORIDE 1500 MG/1
40 TABLET, EXTENDED RELEASE ORAL PRN
Status: DISCONTINUED | OUTPATIENT
Start: 2024-09-23 | End: 2024-09-24 | Stop reason: HOSPADM

## 2024-09-23 RX ORDER — MORPHINE SULFATE 4 MG/ML
4 INJECTION, SOLUTION INTRAMUSCULAR; INTRAVENOUS
Status: DISCONTINUED | OUTPATIENT
Start: 2024-09-23 | End: 2024-09-24 | Stop reason: HOSPADM

## 2024-09-23 RX ORDER — ZOLPIDEM TARTRATE 5 MG/1
5 TABLET ORAL NIGHTLY PRN
Status: DISCONTINUED | OUTPATIENT
Start: 2024-09-23 | End: 2024-09-24 | Stop reason: HOSPADM

## 2024-09-23 RX ORDER — ASPIRIN 81 MG/1
81 TABLET, CHEWABLE ORAL DAILY
Status: DISCONTINUED | OUTPATIENT
Start: 2024-09-23 | End: 2024-09-24 | Stop reason: HOSPADM

## 2024-09-23 RX ORDER — DROPERIDOL 2.5 MG/ML
INJECTION, SOLUTION INTRAMUSCULAR; INTRAVENOUS
Status: DISCONTINUED | OUTPATIENT
Start: 2024-09-23 | End: 2024-09-23 | Stop reason: SDUPTHER

## 2024-09-23 RX ORDER — POTASSIUM CHLORIDE 7.45 MG/ML
10 INJECTION INTRAVENOUS PRN
Status: DISCONTINUED | OUTPATIENT
Start: 2024-09-23 | End: 2024-09-24 | Stop reason: HOSPADM

## 2024-09-23 RX ORDER — ONDANSETRON 2 MG/ML
4 INJECTION INTRAMUSCULAR; INTRAVENOUS ONCE
Status: COMPLETED | OUTPATIENT
Start: 2024-09-23 | End: 2024-09-23

## 2024-09-23 RX ORDER — ACETAMINOPHEN 160 MG/5ML
650 LIQUID ORAL EVERY 6 HOURS
Status: DISCONTINUED | OUTPATIENT
Start: 2024-09-23 | End: 2024-09-23 | Stop reason: ALTCHOICE

## 2024-09-23 RX ORDER — FENTANYL CITRATE 50 UG/ML
50 INJECTION, SOLUTION INTRAMUSCULAR; INTRAVENOUS EVERY 5 MIN PRN
Status: DISCONTINUED | OUTPATIENT
Start: 2024-09-23 | End: 2024-09-23 | Stop reason: HOSPADM

## 2024-09-23 RX ORDER — MAGNESIUM SULFATE IN WATER 40 MG/ML
2000 INJECTION, SOLUTION INTRAVENOUS PRN
Status: DISCONTINUED | OUTPATIENT
Start: 2024-09-23 | End: 2024-09-24 | Stop reason: HOSPADM

## 2024-09-23 RX ORDER — EPHEDRINE SULFATE 50 MG/ML
INJECTION INTRAVENOUS
Status: DISCONTINUED | OUTPATIENT
Start: 2024-09-23 | End: 2024-09-23 | Stop reason: SDUPTHER

## 2024-09-23 RX ORDER — SODIUM CHLORIDE, SODIUM LACTATE, POTASSIUM CHLORIDE, CALCIUM CHLORIDE 600; 310; 30; 20 MG/100ML; MG/100ML; MG/100ML; MG/100ML
INJECTION, SOLUTION INTRAVENOUS CONTINUOUS
Status: DISCONTINUED | OUTPATIENT
Start: 2024-09-23 | End: 2024-09-23 | Stop reason: HOSPADM

## 2024-09-23 RX ADMIN — SODIUM CHLORIDE, POTASSIUM CHLORIDE, SODIUM LACTATE AND CALCIUM CHLORIDE: 600; 310; 30; 20 INJECTION, SOLUTION INTRAVENOUS at 07:50

## 2024-09-23 RX ADMIN — KETAMINE HYDROCHLORIDE 20 MG: 10 INJECTION INTRAMUSCULAR; INTRAVENOUS at 09:20

## 2024-09-23 RX ADMIN — CEFAZOLIN 2000 MG: 2 INJECTION, POWDER, FOR SOLUTION INTRAMUSCULAR; INTRAVENOUS at 09:20

## 2024-09-23 RX ADMIN — EPHEDRINE SULFATE 20 MG: 50 INJECTION INTRAVENOUS at 09:50

## 2024-09-23 RX ADMIN — HYDROCODONE BITARTRATE AND ACETAMINOPHEN 1 TABLET: 5; 325 TABLET ORAL at 20:45

## 2024-09-23 RX ADMIN — HYDROMORPHONE HYDROCHLORIDE 0.5 MG: 1 INJECTION, SOLUTION INTRAMUSCULAR; INTRAVENOUS; SUBCUTANEOUS at 10:20

## 2024-09-23 RX ADMIN — PHENYLEPHRINE HYDROCHLORIDE 100 MCG: 10 INJECTION INTRAVENOUS at 09:30

## 2024-09-23 RX ADMIN — KETAMINE HYDROCHLORIDE 30 MG: 10 INJECTION INTRAMUSCULAR; INTRAVENOUS at 09:15

## 2024-09-23 RX ADMIN — FAMOTIDINE 20 MG: 10 INJECTION, SOLUTION INTRAVENOUS at 12:41

## 2024-09-23 RX ADMIN — PHENYLEPHRINE HYDROCHLORIDE 100 MCG: 10 INJECTION INTRAVENOUS at 09:25

## 2024-09-23 RX ADMIN — ZOLPIDEM TARTRATE 5 MG: 5 TABLET, COATED ORAL at 20:45

## 2024-09-23 RX ADMIN — MORPHINE SULFATE 4 MG: 4 INJECTION, SOLUTION INTRAMUSCULAR; INTRAVENOUS at 18:32

## 2024-09-23 RX ADMIN — ACETAMINOPHEN 325 MG: 325 TABLET ORAL at 12:40

## 2024-09-23 RX ADMIN — PROPOFOL 150 MCG/KG/MIN: 10 INJECTION, EMULSION INTRAVENOUS at 09:17

## 2024-09-23 RX ADMIN — HEPARIN SODIUM 5000 UNITS: 5000 INJECTION INTRAVENOUS; SUBCUTANEOUS at 08:28

## 2024-09-23 RX ADMIN — ACETAMINOPHEN 650 MG: 325 TABLET ORAL at 08:13

## 2024-09-23 RX ADMIN — PHENYLEPHRINE HYDROCHLORIDE 100 MCG: 10 INJECTION INTRAVENOUS at 09:40

## 2024-09-23 RX ADMIN — ACETAMINOPHEN 325 MG: 325 TABLET ORAL at 16:27

## 2024-09-23 RX ADMIN — HYDROMORPHONE HYDROCHLORIDE 0.5 MG: 1 INJECTION, SOLUTION INTRAMUSCULAR; INTRAVENOUS; SUBCUTANEOUS at 10:25

## 2024-09-23 RX ADMIN — VASOPRESSIN 2 UNITS: 20 INJECTION INTRAVENOUS at 09:42

## 2024-09-23 RX ADMIN — ONDANSETRON 4 MG: 2 INJECTION INTRAMUSCULAR; INTRAVENOUS at 09:15

## 2024-09-23 RX ADMIN — ROPINIROLE HYDROCHLORIDE 1 MG: 1 TABLET, FILM COATED ORAL at 20:45

## 2024-09-23 RX ADMIN — ROCURONIUM BROMIDE 50 MG: 10 INJECTION INTRAVENOUS at 09:15

## 2024-09-23 RX ADMIN — ACETAMINOPHEN 325 MG: 325 TABLET ORAL at 20:45

## 2024-09-23 RX ADMIN — LOSARTAN POTASSIUM 25 MG: 25 TABLET, FILM COATED ORAL at 22:32

## 2024-09-23 RX ADMIN — PROPOFOL 140 MG: 10 INJECTION, EMULSION INTRAVENOUS at 09:15

## 2024-09-23 RX ADMIN — SODIUM CHLORIDE, POTASSIUM CHLORIDE, SODIUM LACTATE AND CALCIUM CHLORIDE: 600; 310; 30; 20 INJECTION, SOLUTION INTRAVENOUS at 11:32

## 2024-09-23 RX ADMIN — ESCITALOPRAM OXALATE 20 MG: 10 TABLET ORAL at 12:41

## 2024-09-23 RX ADMIN — KETOROLAC TROMETHAMINE 30 MG: 30 INJECTION, SOLUTION INTRAMUSCULAR; INTRAVENOUS at 17:33

## 2024-09-23 RX ADMIN — QUETIAPINE FUMARATE 50 MG: 25 TABLET ORAL at 22:32

## 2024-09-23 RX ADMIN — SODIUM CHLORIDE 10 ML: 9 INJECTION INTRAMUSCULAR; INTRAVENOUS; SUBCUTANEOUS at 12:46

## 2024-09-23 RX ADMIN — VASOPRESSIN 2 UNITS: 20 INJECTION INTRAVENOUS at 09:40

## 2024-09-23 RX ADMIN — DROPERIDOL 0.62 MG: 2.5 INJECTION, SOLUTION INTRAMUSCULAR; INTRAVENOUS at 10:43

## 2024-09-23 RX ADMIN — SUGAMMADEX 100 MG: 100 INJECTION, SOLUTION INTRAVENOUS at 10:21

## 2024-09-23 RX ADMIN — PHENYLEPHRINE HYDROCHLORIDE 200 MCG: 10 INJECTION INTRAVENOUS at 09:35

## 2024-09-23 RX ADMIN — DEXAMETHASONE SODIUM PHOSPHATE 8 MG: 4 INJECTION, SOLUTION INTRAMUSCULAR; INTRAVENOUS at 09:15

## 2024-09-23 RX ADMIN — ONDANSETRON 4 MG: 2 INJECTION INTRAMUSCULAR; INTRAVENOUS at 08:15

## 2024-09-23 RX ADMIN — SUGAMMADEX 100 MG: 100 INJECTION, SOLUTION INTRAVENOUS at 10:25

## 2024-09-23 RX ADMIN — ASPIRIN 81 MG: 81 TABLET, CHEWABLE ORAL at 12:41

## 2024-09-23 RX ADMIN — DROPERIDOL 0.62 MG: 2.5 INJECTION, SOLUTION INTRAMUSCULAR; INTRAVENOUS at 10:00

## 2024-09-23 RX ADMIN — VASOPRESSIN 2 UNITS: 20 INJECTION INTRAVENOUS at 09:44

## 2024-09-23 RX ADMIN — HYDROCODONE BITARTRATE AND ACETAMINOPHEN 1 TABLET: 5; 325 TABLET ORAL at 12:40

## 2024-09-23 RX ADMIN — BUSPIRONE HYDROCHLORIDE 10 MG: 5 TABLET ORAL at 20:45

## 2024-09-23 RX ADMIN — HYDROMORPHONE HYDROCHLORIDE 1 MG: 1 INJECTION, SOLUTION INTRAMUSCULAR; INTRAVENOUS; SUBCUTANEOUS at 09:15

## 2024-09-23 RX ADMIN — METOCLOPRAMIDE 10 MG: 5 INJECTION, SOLUTION INTRAMUSCULAR; INTRAVENOUS at 12:41

## 2024-09-23 RX ADMIN — EPHEDRINE SULFATE 30 MG: 50 INJECTION INTRAVENOUS at 09:45

## 2024-09-23 RX ADMIN — BUSPIRONE HYDROCHLORIDE 10 MG: 5 TABLET ORAL at 12:41

## 2024-09-23 ASSESSMENT — PAIN DESCRIPTION - LOCATION
LOCATION: ABDOMEN

## 2024-09-23 ASSESSMENT — PAIN DESCRIPTION - PAIN TYPE
TYPE: SURGICAL PAIN
TYPE: SURGICAL PAIN

## 2024-09-23 ASSESSMENT — PAIN DESCRIPTION - DESCRIPTORS
DESCRIPTORS: ACHING;SHOOTING
DESCRIPTORS: ACHING
DESCRIPTORS: SORE;SPASM
DESCRIPTORS: CRAMPING
DESCRIPTORS: ACHING

## 2024-09-23 ASSESSMENT — PAIN SCALES - GENERAL
PAINLEVEL_OUTOF10: 6
PAINLEVEL_OUTOF10: 7
PAINLEVEL_OUTOF10: 8
PAINLEVEL_OUTOF10: 10
PAINLEVEL_OUTOF10: 9
PAINLEVEL_OUTOF10: 5
PAINLEVEL_OUTOF10: 0

## 2024-09-23 ASSESSMENT — PAIN - FUNCTIONAL ASSESSMENT
PAIN_FUNCTIONAL_ASSESSMENT: ACTIVITIES ARE NOT PREVENTED

## 2024-09-23 ASSESSMENT — PAIN DESCRIPTION - ORIENTATION
ORIENTATION: MID

## 2024-09-23 ASSESSMENT — PAIN DESCRIPTION - FREQUENCY: FREQUENCY: CONTINUOUS

## 2024-09-23 ASSESSMENT — PAIN DESCRIPTION - ONSET: ONSET: ON-GOING

## 2024-09-24 VITALS
HEIGHT: 61 IN | HEART RATE: 95 BPM | WEIGHT: 208.4 LBS | OXYGEN SATURATION: 95 % | TEMPERATURE: 98.9 F | BODY MASS INDEX: 39.35 KG/M2 | DIASTOLIC BLOOD PRESSURE: 73 MMHG | RESPIRATION RATE: 16 BRPM | SYSTOLIC BLOOD PRESSURE: 127 MMHG

## 2024-09-24 LAB
ALBUMIN SERPL-MCNC: 3.9 G/DL (ref 3.4–5)
ALBUMIN/GLOB SERPL: 2.1 {RATIO} (ref 1.1–2.2)
ALP SERPL-CCNC: 78 U/L (ref 40–129)
ALT SERPL-CCNC: 135 U/L (ref 10–40)
ANION GAP SERPL CALCULATED.3IONS-SCNC: 11 MMOL/L (ref 9–17)
AST SERPL-CCNC: 151 U/L (ref 15–37)
BASOPHILS # BLD: 0.01 K/UL
BASOPHILS NFR BLD: 0 % (ref 0–1)
BILIRUB SERPL-MCNC: 0.6 MG/DL (ref 0–1)
BUN SERPL-MCNC: 8 MG/DL (ref 7–20)
CALCIUM SERPL-MCNC: 9 MG/DL (ref 8.3–10.6)
CHLORIDE SERPL-SCNC: 102 MMOL/L (ref 99–110)
CO2 SERPL-SCNC: 25 MMOL/L (ref 21–32)
CREAT SERPL-MCNC: 0.6 MG/DL (ref 0.6–1.1)
EOSINOPHIL # BLD: 0 K/UL
EOSINOPHILS RELATIVE PERCENT: 0 % (ref 0–3)
ERYTHROCYTE [DISTWIDTH] IN BLOOD BY AUTOMATED COUNT: 14.5 % (ref 11.7–14.9)
GFR, ESTIMATED: >90 ML/MIN/1.73M2
GLUCOSE SERPL-MCNC: 108 MG/DL (ref 74–99)
HCT VFR BLD AUTO: 35.9 % (ref 37–47)
HGB BLD-MCNC: 11.1 G/DL (ref 12.5–16)
IMM GRANULOCYTES # BLD AUTO: 0.03 K/UL
IMM GRANULOCYTES NFR BLD: 0 %
LYMPHOCYTES NFR BLD: 0.74 K/UL
LYMPHOCYTES RELATIVE PERCENT: 8 % (ref 24–44)
MCH RBC QN AUTO: 28.5 PG (ref 27–31)
MCHC RBC AUTO-ENTMCNC: 30.9 G/DL (ref 32–36)
MCV RBC AUTO: 92.3 FL (ref 78–100)
MONOCYTES NFR BLD: 0.54 K/UL
MONOCYTES NFR BLD: 6 % (ref 0–4)
NEUTROPHILS NFR BLD: 86 % (ref 36–66)
NEUTS SEG NFR BLD: 8.38 K/UL
PLATELET # BLD AUTO: 272 K/UL (ref 140–440)
PMV BLD AUTO: 11 FL (ref 7.5–11.1)
POTASSIUM SERPL-SCNC: 3.9 MMOL/L (ref 3.5–5.1)
PROT SERPL-MCNC: 5.7 G/DL (ref 6.4–8.2)
RBC # BLD AUTO: 3.89 M/UL (ref 4.2–5.4)
SODIUM SERPL-SCNC: 138 MMOL/L (ref 136–145)
SURGICAL PATHOLOGY REPORT: NORMAL
WBC OTHER # BLD: 9.7 K/UL (ref 4–10.5)

## 2024-09-24 PROCEDURE — 96376 TX/PRO/DX INJ SAME DRUG ADON: CPT

## 2024-09-24 PROCEDURE — 94150 VITAL CAPACITY TEST: CPT

## 2024-09-24 PROCEDURE — 85025 COMPLETE CBC W/AUTO DIFF WBC: CPT

## 2024-09-24 PROCEDURE — 96375 TX/PRO/DX INJ NEW DRUG ADDON: CPT

## 2024-09-24 PROCEDURE — 94761 N-INVAS EAR/PLS OXIMETRY MLT: CPT

## 2024-09-24 PROCEDURE — 6370000000 HC RX 637 (ALT 250 FOR IP): Performed by: SURGERY

## 2024-09-24 PROCEDURE — 2580000003 HC RX 258: Performed by: SURGERY

## 2024-09-24 PROCEDURE — 2500000003 HC RX 250 WO HCPCS: Performed by: SURGERY

## 2024-09-24 PROCEDURE — 80053 COMPREHEN METABOLIC PANEL: CPT

## 2024-09-24 PROCEDURE — 6370000000 HC RX 637 (ALT 250 FOR IP): Performed by: STUDENT IN AN ORGANIZED HEALTH CARE EDUCATION/TRAINING PROGRAM

## 2024-09-24 PROCEDURE — 36415 COLL VENOUS BLD VENIPUNCTURE: CPT

## 2024-09-24 PROCEDURE — G0378 HOSPITAL OBSERVATION PER HR: HCPCS

## 2024-09-24 PROCEDURE — 96372 THER/PROPH/DIAG INJ SC/IM: CPT

## 2024-09-24 PROCEDURE — 96374 THER/PROPH/DIAG INJ IV PUSH: CPT

## 2024-09-24 PROCEDURE — 6360000002 HC RX W HCPCS: Performed by: SURGERY

## 2024-09-24 PROCEDURE — 99024 POSTOP FOLLOW-UP VISIT: CPT | Performed by: SURGERY

## 2024-09-24 RX ORDER — HYDROCODONE BITARTRATE AND ACETAMINOPHEN 5; 325 MG/1; MG/1
1 TABLET ORAL EVERY 6 HOURS PRN
Qty: 10 TABLET | Refills: 0 | Status: SHIPPED | OUTPATIENT
Start: 2024-09-24 | End: 2024-09-27

## 2024-09-24 RX ORDER — FAMOTIDINE 20 MG/1
20 TABLET, FILM COATED ORAL 2 TIMES DAILY
Qty: 60 TABLET | Refills: 3 | Status: SHIPPED | OUTPATIENT
Start: 2024-09-24

## 2024-09-24 RX ORDER — ONDANSETRON 4 MG/1
4 TABLET, FILM COATED ORAL DAILY PRN
Qty: 20 TABLET | Refills: 3 | Status: SHIPPED | OUTPATIENT
Start: 2024-09-24

## 2024-09-24 RX ORDER — ASPIRIN 81 MG/1
81 TABLET ORAL DAILY
Qty: 30 TABLET | Refills: 3 | Status: SHIPPED | OUTPATIENT
Start: 2024-09-26

## 2024-09-24 RX ORDER — AMOXICILLIN 250 MG
2 CAPSULE ORAL DAILY
Qty: 60 TABLET | Refills: 0 | Status: SHIPPED | OUTPATIENT
Start: 2024-09-24 | End: 2024-10-24

## 2024-09-24 RX ORDER — SODIUM CHLORIDE 9 MG/ML
INJECTION, SOLUTION INTRAMUSCULAR; INTRAVENOUS; SUBCUTANEOUS
Status: DISPENSED
Start: 2024-09-24 | End: 2024-09-24

## 2024-09-24 RX ADMIN — ASPIRIN 81 MG: 81 TABLET, CHEWABLE ORAL at 09:40

## 2024-09-24 RX ADMIN — ENOXAPARIN SODIUM 40 MG: 100 INJECTION SUBCUTANEOUS at 00:09

## 2024-09-24 RX ADMIN — ACETAMINOPHEN 325 MG: 325 TABLET ORAL at 09:39

## 2024-09-24 RX ADMIN — KETOROLAC TROMETHAMINE 30 MG: 30 INJECTION, SOLUTION INTRAMUSCULAR; INTRAVENOUS at 00:45

## 2024-09-24 RX ADMIN — SODIUM CHLORIDE, PRESERVATIVE FREE 10 ML: 5 INJECTION INTRAVENOUS at 09:41

## 2024-09-24 RX ADMIN — FAMOTIDINE 20 MG: 10 INJECTION, SOLUTION INTRAVENOUS at 00:42

## 2024-09-24 RX ADMIN — KETOROLAC TROMETHAMINE 30 MG: 30 INJECTION, SOLUTION INTRAMUSCULAR; INTRAVENOUS at 07:04

## 2024-09-24 RX ADMIN — ACETAMINOPHEN 325 MG: 325 TABLET ORAL at 00:09

## 2024-09-24 RX ADMIN — AMLODIPINE BESYLATE 5 MG: 5 TABLET ORAL at 09:40

## 2024-09-24 RX ADMIN — BUSPIRONE HYDROCHLORIDE 10 MG: 5 TABLET ORAL at 09:40

## 2024-09-24 RX ADMIN — ACETAMINOPHEN 325 MG: 325 TABLET ORAL at 04:49

## 2024-09-24 RX ADMIN — FAMOTIDINE 20 MG: 10 INJECTION, SOLUTION INTRAVENOUS at 09:40

## 2024-09-24 RX ADMIN — LEVOTHYROXINE SODIUM 25 MCG: 25 TABLET ORAL at 07:04

## 2024-09-24 RX ADMIN — ESCITALOPRAM OXALATE 20 MG: 10 TABLET ORAL at 09:39

## 2024-09-24 RX ADMIN — KETOROLAC TROMETHAMINE 30 MG: 30 INJECTION, SOLUTION INTRAMUSCULAR; INTRAVENOUS at 12:03

## 2024-09-24 RX ADMIN — ACETAMINOPHEN 325 MG: 325 TABLET ORAL at 12:03

## 2024-09-24 ASSESSMENT — PAIN - FUNCTIONAL ASSESSMENT
PAIN_FUNCTIONAL_ASSESSMENT: PREVENTS OR INTERFERES WITH MANY ACTIVE NOT PASSIVE ACTIVITIES
PAIN_FUNCTIONAL_ASSESSMENT: ACTIVITIES ARE NOT PREVENTED

## 2024-09-24 ASSESSMENT — PAIN DESCRIPTION - ORIENTATION
ORIENTATION: MID
ORIENTATION: ANTERIOR;UPPER
ORIENTATION: RIGHT
ORIENTATION: UPPER
ORIENTATION: MID

## 2024-09-24 ASSESSMENT — PAIN DESCRIPTION - PAIN TYPE
TYPE: SURGICAL PAIN
TYPE: SURGICAL PAIN

## 2024-09-24 ASSESSMENT — PAIN SCALES - GENERAL
PAINLEVEL_OUTOF10: 6
PAINLEVEL_OUTOF10: 2
PAINLEVEL_OUTOF10: 8
PAINLEVEL_OUTOF10: 7
PAINLEVEL_OUTOF10: 6
PAINLEVEL_OUTOF10: 6

## 2024-09-24 ASSESSMENT — PAIN DESCRIPTION - LOCATION
LOCATION: ABDOMEN

## 2024-09-24 ASSESSMENT — PAIN DESCRIPTION - DESCRIPTORS
DESCRIPTORS: SORE;SPASM
DESCRIPTORS: ACHING
DESCRIPTORS: ACHING;PENETRATING

## 2024-09-24 ASSESSMENT — PAIN SCALES - WONG BAKER: WONGBAKER_NUMERICALRESPONSE: NO HURT

## 2024-09-25 ENCOUNTER — TELEPHONE (OUTPATIENT)
Dept: BARIATRICS/WEIGHT MGMT | Age: 49
End: 2024-09-25

## 2024-09-27 ENCOUNTER — OFFICE VISIT (OUTPATIENT)
Dept: CARDIOLOGY CLINIC | Age: 49
End: 2024-09-27
Payer: COMMERCIAL

## 2024-09-27 VITALS
SYSTOLIC BLOOD PRESSURE: 112 MMHG | BODY MASS INDEX: 39.38 KG/M2 | DIASTOLIC BLOOD PRESSURE: 74 MMHG | HEIGHT: 61 IN | HEART RATE: 80 BPM

## 2024-09-27 DIAGNOSIS — I10 ESSENTIAL HYPERTENSION: ICD-10-CM

## 2024-09-27 DIAGNOSIS — E66.812 CLASS 2 SEVERE OBESITY DUE TO EXCESS CALORIES WITH SERIOUS COMORBIDITY AND BODY MASS INDEX (BMI) OF 39.0 TO 39.9 IN ADULT: ICD-10-CM

## 2024-09-27 DIAGNOSIS — R07.9 CHEST PAIN, UNSPECIFIED TYPE: Primary | ICD-10-CM

## 2024-09-27 DIAGNOSIS — G47.33 OBSTRUCTIVE SLEEP APNEA: ICD-10-CM

## 2024-09-27 DIAGNOSIS — E66.01 CLASS 2 SEVERE OBESITY DUE TO EXCESS CALORIES WITH SERIOUS COMORBIDITY AND BODY MASS INDEX (BMI) OF 39.0 TO 39.9 IN ADULT: ICD-10-CM

## 2024-09-27 DIAGNOSIS — E78.5 DYSLIPIDEMIA: ICD-10-CM

## 2024-09-27 DIAGNOSIS — R06.02 SHORTNESS OF BREATH: ICD-10-CM

## 2024-09-27 PROCEDURE — 3078F DIAST BP <80 MM HG: CPT | Performed by: INTERNAL MEDICINE

## 2024-09-27 PROCEDURE — G8427 DOCREV CUR MEDS BY ELIG CLIN: HCPCS | Performed by: INTERNAL MEDICINE

## 2024-09-27 PROCEDURE — 99214 OFFICE O/P EST MOD 30 MIN: CPT | Performed by: INTERNAL MEDICINE

## 2024-09-27 PROCEDURE — 3074F SYST BP LT 130 MM HG: CPT | Performed by: INTERNAL MEDICINE

## 2024-09-27 PROCEDURE — G8417 CALC BMI ABV UP PARAM F/U: HCPCS | Performed by: INTERNAL MEDICINE

## 2024-09-27 PROCEDURE — 1036F TOBACCO NON-USER: CPT | Performed by: INTERNAL MEDICINE

## 2024-09-27 RX ORDER — ATORVASTATIN CALCIUM 80 MG/1
80 TABLET, FILM COATED ORAL DAILY
COMMUNITY
End: 2024-09-27 | Stop reason: SDUPTHER

## 2024-09-27 RX ORDER — ATORVASTATIN CALCIUM 80 MG/1
80 TABLET, FILM COATED ORAL DAILY
Qty: 30 TABLET | Refills: 5 | Status: SHIPPED | OUTPATIENT
Start: 2024-09-27

## 2024-09-27 NOTE — PROGRESS NOTES
Emeka Estevez MD                                  CARDIOLOGY  NOTE        Chief Complaint:    Chief Complaint   Patient presents with    6 Month Follow-Up     Pt denies any new cardiac sx she just had weight loss surgery on 9/23/24      Has been doing fairly well  Better blood pressure control    ECHO 10/19/2023     Left ventricular systolic function is normal with an ejection fraction of   55-60%.   Grade I diastolic dysfunction.   Normal left ventricular size and   Mild-to-moderate pulmonic regurgitation present.   No evidence of pericardial effusion.     EST 10/24/2023     Normal near maximal study negative for angina or ECG evidence of ischemia.   Exercise tolerance is good. Exercised for 6:00 min on Gomez protocol   achieving workload of 7.00 METS.   Appropriate hemodynamic response to exercise is noted.   Alonzo treadmill score is +6 considered Low risk for cardiac events.        Prior history    Noa is a 49 y.o. year old female who recently presented to the ER with a chief complaint of chest pain    Patient mentioned that a few years back she was on antihypertensive medications however was discontinued by PCPs office.  Recently patient has been experiencing retrosternal chest discomfort 5 out of 10 mostly exertional and associated with shortness of breath occasional palpitations.  Patient denies any dependent edema  She complains of fatigue and tiredness through the day, is unsure about snoring at night.  She denies any smoking alcohol or drug abuse.  Patient has prior history of migraines, questionable TIA with hypertensive urgency in the past.      She mentioned that she has been experiencing intermittent chest discomfort almost on a daily basis which is exacerbated with exertion.        Current Outpatient Medications   Medication Sig Dispense Refill    atorvastatin (LIPITOR) 80 MG tablet Take 1 tablet by mouth daily      aspirin 81 MG EC tablet Take 1 tablet by mouth daily 30 tablet 3

## 2024-09-27 NOTE — PATIENT INSTRUCTIONS
We are committed to providing you the best care possible.    If you receive a survey after visiting one of our offices, please take time to share your experience concerning your physician office visit.  These surveys are confidential and no health information about you is shared.    We are eager to improve for you and we are counting on your feedback to help make that happen.      Thank you for allowing us to care for you today!   We want to ensure we can follow your treatment plan and we strive to give you the best outcomes and experience possible.   If you ever have a life threatening emergency and call 911 - for an ambulance (EMS)   Our providers can only care for you at:   [facility] or [facility].   Even if you have someone take you or you drive yourself we can only care for you in a [facility] facility. Our providers are not setup at the other healthcare locations!   **It is YOUR responsibilty to bring medication bottles and/or updated medication list to EACH APPOINTMENT. This will allow us to better serve you and all your healthcare needs**  Please be informed that if you contact our office outside of normal business hours the physician on call cannot help with any scheduling or rescheduling issues, procedure instruction questions or any type of medication issue.    We advise you for any urgent/emergency that you go to the nearest emergency room!    PLEASE CALL OUR OFFICE DURING NORMAL BUSINESS HOURS    Monday - Friday   8 am to 5 pm    [place]: [phone]    [place]: [phone]    [place]:  [phone]

## 2024-09-30 ENCOUNTER — TELEPHONE (OUTPATIENT)
Dept: BARIATRICS/WEIGHT MGMT | Age: 49
End: 2024-09-30

## 2024-10-01 ENCOUNTER — OFFICE VISIT (OUTPATIENT)
Dept: BARIATRICS/WEIGHT MGMT | Age: 49
End: 2024-10-01

## 2024-10-01 VITALS
OXYGEN SATURATION: 94 % | WEIGHT: 228.8 LBS | HEIGHT: 61 IN | SYSTOLIC BLOOD PRESSURE: 110 MMHG | BODY MASS INDEX: 43.2 KG/M2 | DIASTOLIC BLOOD PRESSURE: 74 MMHG | HEART RATE: 70 BPM

## 2024-10-01 DIAGNOSIS — Z98.84 STATUS POST BARIATRIC SURGERY: Primary | ICD-10-CM

## 2024-10-01 ASSESSMENT — ENCOUNTER SYMPTOMS: CONSTIPATION: 1

## 2024-10-01 NOTE — PROGRESS NOTES
(spasms) 30 tablet 0    levothyroxine (SYNTHROID) 25 MCG tablet Take 1 tablet by mouth Daily      QUEtiapine (SEROQUEL) 50 MG tablet       nitroGLYCERIN (NITROSTAT) 0.4 MG SL tablet Place 1 tablet under the tongue every 5 minutes as needed for Chest pain 25 tablet 3    zolpidem (AMBIEN) 5 MG tablet Take 1 tablet by mouth nightly as needed for Sleep.      busPIRone (BUSPAR) 10 MG tablet Take 1 tablet by mouth in the morning and at bedtime      escitalopram (LEXAPRO) 20 MG tablet Take 1 tablet by mouth daily      buPROPion (WELLBUTRIN) 100 MG tablet Take 1 tablet by mouth 2 times daily (Patient not taking: Reported on 9/27/2024)       No current facility-administered medications for this visit.        Allergies   Allergen Reactions    Cyclobenzaprine Other (See Comments)     anger    Trazodone Other (See Comments)     anger    Benadryl [Diphenhydramine] Anxiety     Severe anxiety and agitation. States can take it oral but not IV         Review of Systems   Gastrointestinal:  Positive for constipation (now improved).   All other systems reviewed and are negative.      OBJECTIVE:    /74 (Site: Right Upper Arm, Position: Sitting, Cuff Size: Large Adult)   Pulse 70   Ht 1.549 m (5' 1\")   Wt 103.8 kg (228 lb 12.8 oz)   SpO2 94%   BMI 43.23 kg/m²      Physical Exam  Vitals reviewed.   Constitutional:       General: She is not in acute distress.  Abdominal:      Palpations: Abdomen is soft.      Comments: C/D/I very minimal bruising     Neurological:      Mental Status: She is alert.       Pathology:  -- Diagnosis --   Portion of stomach; partial sleeve gastrectomy:   -     No significant histopathologic abnormality.     ASSESSMENT & PLAN:    1. Status post bariatric surgery  -reviewed pathology report  -diet per protocol, bariatric pureed starting Monday x 2 weeks  -goal of 600 lauryn / d, 60 g protein /d  -Pt encouraged to continue exercising. Minimum goal of 150 minutes per week with ideal goal of 300 min per

## 2024-10-06 ENCOUNTER — HOSPITAL ENCOUNTER (EMERGENCY)
Age: 49
Discharge: HOME | End: 2024-10-06
Attending: EMERGENCY MEDICINE
Payer: COMMERCIAL

## 2024-10-06 VITALS
HEIGHT: 61 IN | DIASTOLIC BLOOD PRESSURE: 102 MMHG | TEMPERATURE: 99.2 F | BODY MASS INDEX: 43.08 KG/M2 | HEART RATE: 97 BPM | SYSTOLIC BLOOD PRESSURE: 135 MMHG | OXYGEN SATURATION: 98 % | WEIGHT: 228.2 LBS | RESPIRATION RATE: 18 BRPM

## 2024-10-06 DIAGNOSIS — B37.2 YEAST DERMATITIS: Primary | ICD-10-CM

## 2024-10-06 LAB
ALBUMIN SERPL-MCNC: 4.2 G/DL (ref 3.4–5)
ALBUMIN/GLOB SERPL: 1.7 {RATIO} (ref 1.1–2.2)
ALP SERPL-CCNC: 88 U/L (ref 40–129)
ALT SERPL-CCNC: 14 U/L (ref 10–40)
ANION GAP SERPL CALCULATED.3IONS-SCNC: 14 MMOL/L (ref 4–16)
AST SERPL-CCNC: 20 U/L (ref 15–37)
BASOPHILS # BLD: 0.05 K/UL
BASOPHILS NFR BLD: 1 % (ref 0–1)
BILIRUB SERPL-MCNC: 0.8 MG/DL (ref 0–1)
BUN SERPL-MCNC: 9 MG/DL (ref 6–23)
CALCIUM SERPL-MCNC: 9.1 MG/DL (ref 8.3–10.6)
CHLORIDE SERPL-SCNC: 102 MMOL/L (ref 99–110)
CO2 SERPL-SCNC: 25 MMOL/L (ref 21–32)
CREAT SERPL-MCNC: 0.7 MG/DL (ref 0.6–1.1)
EOSINOPHIL # BLD: 0.09 K/UL
EOSINOPHILS RELATIVE PERCENT: 1 % (ref 0–3)
ERYTHROCYTE [DISTWIDTH] IN BLOOD BY AUTOMATED COUNT: 14.3 % (ref 11.7–14.9)
GFR, ESTIMATED: >90 ML/MIN/1.73M2
GLUCOSE SERPL-MCNC: 88 MG/DL (ref 70–99)
HCT VFR BLD AUTO: 41.4 % (ref 37–47)
HGB BLD-MCNC: 12.8 G/DL (ref 12.5–16)
IMM GRANULOCYTES # BLD AUTO: 0.04 K/UL
IMM GRANULOCYTES NFR BLD: 0 %
LACTATE BLDV-SCNC: 1.2 MMOL/L (ref 0.4–2)
LYMPHOCYTES NFR BLD: 0.64 K/UL
LYMPHOCYTES RELATIVE PERCENT: 6 % (ref 24–44)
MCH RBC QN AUTO: 28.4 PG (ref 27–31)
MCHC RBC AUTO-ENTMCNC: 30.9 G/DL (ref 32–36)
MCV RBC AUTO: 91.8 FL (ref 78–100)
MONOCYTES NFR BLD: 0.47 K/UL
MONOCYTES NFR BLD: 4 % (ref 0–4)
NEUTROPHILS NFR BLD: 88 % (ref 36–66)
NEUTS SEG NFR BLD: 9.75 K/UL
PLATELET # BLD AUTO: 341 K/UL (ref 140–440)
PMV BLD AUTO: 11.1 FL (ref 7.5–11.1)
POTASSIUM SERPL-SCNC: 3.8 MMOL/L (ref 3.5–5.1)
PROT SERPL-MCNC: 6.7 G/DL (ref 6.4–8.2)
RBC # BLD AUTO: 4.51 M/UL (ref 4.2–5.4)
SODIUM SERPL-SCNC: 141 MMOL/L (ref 135–145)
WBC OTHER # BLD: 11 K/UL (ref 4–10.5)

## 2024-10-06 PROCEDURE — 99283 EMERGENCY DEPT VISIT LOW MDM: CPT

## 2024-10-06 PROCEDURE — 2500000003 HC RX 250 WO HCPCS: Performed by: EMERGENCY MEDICINE

## 2024-10-06 PROCEDURE — 83605 ASSAY OF LACTIC ACID: CPT

## 2024-10-06 PROCEDURE — 85025 COMPLETE CBC W/AUTO DIFF WBC: CPT

## 2024-10-06 PROCEDURE — 80053 COMPREHEN METABOLIC PANEL: CPT

## 2024-10-06 RX ORDER — NYSTATIN 100000 [USP'U]/G
POWDER TOPICAL
Qty: 30 G | Refills: 0 | Status: SHIPPED | OUTPATIENT
Start: 2024-10-06

## 2024-10-06 RX ORDER — SULFAMETHOXAZOLE/TRIMETHOPRIM 800-160 MG
1 TABLET ORAL 2 TIMES DAILY
Qty: 20 TABLET | Refills: 0 | Status: SHIPPED | OUTPATIENT
Start: 2024-10-06 | End: 2024-10-16

## 2024-10-06 RX ADMIN — MICONAZOLE NITRATE: 2 POWDER TOPICAL at 19:06

## 2024-10-06 NOTE — DISCHARGE INSTRUCTIONS
Use medication as directed.  Use nystatin powder twice daily.  Use the barrier material daily.  Follow-up with your primary caregiver scheduled.  Return to the ER for worsening signs and symptoms.

## 2024-10-06 NOTE — ED PROVIDER NOTES
Emergency Department Encounter  Location: [facility] EMERGENCY DEPARTMENT    Patient: Noa Clark  MRN: [id number]  : 1975  Date of evaluation: 10/6/2024  ED Provider: Mustapha Franz DO, FACEP    Chief Complaint:    Rash (Under rt breast x 1 day )    [place]:  Noa Clark is a 49 y.o. female that presents to the emergency department with complaints of a rash under her right breast that started this morning.  The patient states that she has had recent weight loss surgery about 2 weeks ago.  She states that her surgeon wanted her to come in to get checked because she has developed a rash under her right breast.  She states this morning she awoke and there is an area of skin that has sloughed off but the superficial layers that is approximately 5 cm x 3 cm.  She states the area is not painful and is not itchy.  She has pale red rash extending from this area.  She is not having problems under her left breast or in any other area at this time.    ROS:  At least 4 systems reviewed and otherwise acutely negative except as in the [place].  Negative for fever or chills  Negative for chest pain  Negative for shortness of breath  Negative for nausea vomiting diarrhea or constipation    Past Medical History:   Diagnosis Date    Anxiety     Depression     GERD (gastroesophageal reflux disease)     H/O echocardiogram 10/19/2023    Ef 55-60%. Grade I diastolic dysfunction. Normal left ventricular size and Mild-to-moderate pulmonic regurgitation present    Headache     migraines    Hx of exercise stress test 10/24/2023    Exercise tolerance is good. Exercised for 6:00 min on Gomez protocol achieving workload of 7.00 METS.    Hyperlipidemia     Hypertension     Osteoarthritis of right knee     Palpitation     PONV (postoperative nausea and vomiting)     Thyroid disease     Vitamin D deficiency      Past Surgical History:   Procedure Laterality Date    BREAST REDUCTION SURGERY Bilateral     HYSTERECTOMY (CERVIX STATUS

## 2024-10-07 ENCOUNTER — TELEPHONE (OUTPATIENT)
Dept: BARIATRICS/WEIGHT MGMT | Age: 49
End: 2024-10-07

## 2024-10-07 NOTE — TELEPHONE ENCOUNTER
Pt called stating the skin around her nipples and under her breasts is flaking off. She was seen in ED yesterday and dx with yeast dermatitis, but pt wants a second opinion. She asked that labs be looked over and determine if she needs to be seen sooner than 10/15.

## 2024-10-09 ENCOUNTER — PATIENT MESSAGE (OUTPATIENT)
Dept: BARIATRICS/WEIGHT MGMT | Age: 49
End: 2024-10-09

## 2024-10-14 RX ORDER — LOSARTAN POTASSIUM 25 MG/1
25 TABLET ORAL NIGHTLY
Qty: 90 TABLET | Refills: 3 | Status: SHIPPED | OUTPATIENT
Start: 2024-10-14 | End: 2025-07-11

## 2024-10-14 NOTE — TELEPHONE ENCOUNTER
Doing much better. Foods and liquid down , was given phenergran which helped, dont need to use that or the zofran now.

## 2024-10-15 ENCOUNTER — OFFICE VISIT (OUTPATIENT)
Dept: BARIATRICS/WEIGHT MGMT | Age: 49
End: 2024-10-15

## 2024-10-15 VITALS
DIASTOLIC BLOOD PRESSURE: 64 MMHG | HEART RATE: 82 BPM | BODY MASS INDEX: 41.95 KG/M2 | HEIGHT: 61 IN | SYSTOLIC BLOOD PRESSURE: 100 MMHG | OXYGEN SATURATION: 97 % | WEIGHT: 222.2 LBS

## 2024-10-15 DIAGNOSIS — Z98.84 STATUS POST BARIATRIC SURGERY: Primary | ICD-10-CM

## 2024-10-15 DIAGNOSIS — T21.01XA: ICD-10-CM

## 2024-10-15 PROCEDURE — 99024 POSTOP FOLLOW-UP VISIT: CPT | Performed by: NURSE PRACTITIONER

## 2024-10-15 NOTE — PROGRESS NOTES
The pt's pain is 0 with current pain control regimen.     Dr. Anand prescribed Norco 5/325mg quantity of 10   Pt has taken 10 pills out of  10 pills prescribed    
Chest pain 25 tablet 3    zolpidem (AMBIEN) 5 MG tablet Take 1 tablet by mouth nightly as needed for Sleep.      busPIRone (BUSPAR) 10 MG tablet Take 1 tablet by mouth daily      escitalopram (LEXAPRO) 20 MG tablet Take 1 tablet by mouth daily      buPROPion (WELLBUTRIN) 100 MG tablet Take 1 tablet by mouth 2 times daily (Patient not taking: Reported on 9/27/2024)       No current facility-administered medications for this visit.        Allergies   Allergen Reactions    Cyclobenzaprine Other (See Comments)     anger    Trazodone Other (See Comments)     anger    Benadryl [Diphenhydramine] Anxiety     Severe anxiety and agitation. States can take it oral but not IV         Review of Systems   Genitourinary:         Right breast pain   All other systems reviewed and are negative.      OBJECTIVE:    /64   Pulse 82   Ht 1.549 m (5' 1\")   Wt 100.8 kg (222 lb 3.2 oz)   SpO2 97%   BMI 41.98 kg/m²      Physical Exam  Constitutional:       Appearance: Normal appearance. She is obese.   HENT:      Head: Normocephalic.      Nose: Nose normal.      Mouth/Throat:      Pharynx: Oropharynx is clear.   Eyes:      Pupils: Pupils are equal, round, and reactive to light.   Cardiovascular:      Rate and Rhythm: Normal rate.      Pulses: Normal pulses.   Pulmonary:      Effort: Pulmonary effort is normal.   Abdominal:      Palpations: Abdomen is soft.      Comments: All abdominal incisions intact and well approximated    Musculoskeletal:         General: Normal range of motion.      Cervical back: Normal range of motion.   Skin:     General: Skin is warm and dry.      Capillary Refill: Capillary refill takes less than 2 seconds.      Comments: Right posterior breast pinkish red in color with center area having black colored eschar skin   Neurological:      General: No focal deficit present.      Mental Status: She is alert and oriented to person, place, and time.   Psychiatric:         Mood and Affect: Mood normal.

## 2024-10-16 ENCOUNTER — HOSPITAL ENCOUNTER (OUTPATIENT)
Dept: WOUND CARE | Age: 49
Discharge: HOME | End: 2024-10-16
Attending: NURSE PRACTITIONER
Payer: COMMERCIAL

## 2024-10-16 VITALS
RESPIRATION RATE: 18 BRPM | DIASTOLIC BLOOD PRESSURE: 82 MMHG | HEART RATE: 80 BPM | TEMPERATURE: 98.4 F | SYSTOLIC BLOOD PRESSURE: 126 MMHG

## 2024-10-16 DIAGNOSIS — T21.21XA PARTIAL THICKNESS BURN OF RIGHT BREAST, INITIAL ENCOUNTER: Primary | ICD-10-CM

## 2024-10-16 PROCEDURE — 99213 OFFICE O/P EST LOW 20 MIN: CPT

## 2024-10-16 PROCEDURE — 16020 DRESS/DEBRID P-THICK BURN S: CPT

## 2024-10-16 PROCEDURE — 16020 DRESS/DEBRID P-THICK BURN S: CPT | Performed by: NURSE PRACTITIONER

## 2024-10-16 PROCEDURE — 99203 OFFICE O/P NEW LOW 30 MIN: CPT | Performed by: NURSE PRACTITIONER

## 2024-10-16 RX ORDER — SILVER SULFADIAZINE 10 MG/G
CREAM TOPICAL ONCE
OUTPATIENT
Start: 2024-10-16 | End: 2024-10-16

## 2024-10-16 RX ORDER — LIDOCAINE HYDROCHLORIDE 20 MG/ML
JELLY TOPICAL ONCE
OUTPATIENT
Start: 2024-10-16 | End: 2024-10-16

## 2024-10-16 RX ORDER — SODIUM CHLOR/HYPOCHLOROUS ACID 0.033 %
SOLUTION, IRRIGATION IRRIGATION ONCE
OUTPATIENT
Start: 2024-10-16 | End: 2024-10-16

## 2024-10-16 RX ORDER — LIDOCAINE 40 MG/G
CREAM TOPICAL ONCE
OUTPATIENT
Start: 2024-10-16 | End: 2024-10-16

## 2024-10-16 RX ORDER — MUPIROCIN 20 MG/G
OINTMENT TOPICAL ONCE
OUTPATIENT
Start: 2024-10-16 | End: 2024-10-16

## 2024-10-16 RX ORDER — NEOMYCIN/BACITRACIN/POLYMYXINB 3.5-400-5K
OINTMENT (GRAM) TOPICAL ONCE
OUTPATIENT
Start: 2024-10-16 | End: 2024-10-16

## 2024-10-16 RX ORDER — TRIAMCINOLONE ACETONIDE 1 MG/G
OINTMENT TOPICAL ONCE
OUTPATIENT
Start: 2024-10-16 | End: 2024-10-16

## 2024-10-16 RX ORDER — GENTAMICIN SULFATE 1 MG/G
OINTMENT TOPICAL ONCE
OUTPATIENT
Start: 2024-10-16 | End: 2024-10-16

## 2024-10-16 RX ORDER — GINSENG 100 MG
CAPSULE ORAL ONCE
OUTPATIENT
Start: 2024-10-16 | End: 2024-10-16

## 2024-10-16 RX ORDER — LIDOCAINE HYDROCHLORIDE 40 MG/ML
SOLUTION TOPICAL ONCE
OUTPATIENT
Start: 2024-10-16 | End: 2024-10-16

## 2024-10-16 RX ORDER — LIDOCAINE 50 MG/G
OINTMENT TOPICAL ONCE
OUTPATIENT
Start: 2024-10-16 | End: 2024-10-16

## 2024-10-16 RX ORDER — BACITRACIN ZINC AND POLYMYXIN B SULFATE 500; 1000 [USP'U]/G; [USP'U]/G
OINTMENT TOPICAL ONCE
OUTPATIENT
Start: 2024-10-16 | End: 2024-10-16

## 2024-10-16 RX ORDER — BETAMETHASONE DIPROPIONATE 0.5 MG/G
CREAM TOPICAL ONCE
OUTPATIENT
Start: 2024-10-16 | End: 2024-10-16

## 2024-10-16 RX ORDER — CLOBETASOL PROPIONATE 0.5 MG/G
OINTMENT TOPICAL ONCE
OUTPATIENT
Start: 2024-10-16 | End: 2024-10-16

## 2024-10-16 ASSESSMENT — PAIN DESCRIPTION - DESCRIPTORS: DESCRIPTORS: ACHING;SORE;TENDER

## 2024-10-16 ASSESSMENT — PAIN DESCRIPTION - ORIENTATION: ORIENTATION: RIGHT

## 2024-10-16 ASSESSMENT — PAIN SCALES - GENERAL: PAINLEVEL_OUTOF10: 5

## 2024-10-16 ASSESSMENT — PAIN DESCRIPTION - LOCATION: LOCATION: BREAST

## 2024-10-16 NOTE — PATIENT INSTRUCTIONS
PHYSICIAN ORDERS AND DISCHARGE INSTRUCTIONS  NOTE: Upon discharge from the Wound Center, you will receive a patient experience survey via E-mail. We would be grateful if you would take the time to fill this survey out.  Wound care order history:   TONI's   Right       Left    Date    Vascular studies/Intervention: .     Cultures: .               Antibiotics: .               HbA1c:  .               Grafts:  .   Juxta Lites & Lymph Pumps:  Continuing wound care orders and information:              Residence: .              Continue home health care with:.    Your wound-care supplies will be provided by: .              Pharmacy: .  Wound cleansing:      Do not scrub or use excessive force.    Wash hands with soap and water before and after dressing changes.    Prior to applying a clean dressing, cleanse wound with normal saline,    wound cleanser, or mild soap and water.     Ask your physician or nurse before getting the wound(s) wet in the shower.  Daily Wound management:    Keep weight off wounds and reposition every 2 hours.    Avoid standing for long periods of time.    Evaluate legs to the level of the heart or above for 30 minutes 4-5 times a day and/or when sitting.       When taking antibiotics take entire prescription as ordered by MD do not stop taking until medicine is all gone.     Documentation:  Compression: .   Offloading: .        Orders for this week (10/16/2024):  Right Breast- Wash with mild soap and water, rinse with saline, pat dry with 4x4  Apply anasept  and stimulen powder  to wound bed  Cover with versatel and ABD   Secure with paper tape  Change daily     Please dispense 30 day quantity when sending supplies     Follow up with BENJAMIN Hilario In 1 weeks in the wound care center  Call [phone] for any questions or concerns.

## 2024-10-16 NOTE — WOUND CARE
Patient arrived for nurse visit. New dressings were applied and wounds were cleansed as documented in flowsheet. Wound care was performed per provider orders. Patient tolerated well. Patient had no further questions or concerns. Informed of follow up care, and verbalized understanding.     Electronically signed by Karen Arizmendi RN on 10/16/2024 at 11:21 AMPatient arrived for nurse visit. Old dressings were removed and wounds were cleansed as documented in flowsheet. Wound care was performed per provider orders. Patient tolerated well. Patient had no further questions or concerns. Informed of follow up care, and verbalized understanding.     Electronically signed by Karen Arizmendi RN on 10/16/2024 at 11:21 AM

## 2024-10-16 NOTE — PROGRESS NOTES
Residence: .              Continue home health care with:.    Your wound-care supplies will be provided by: .              Pharmacy: .  Wound cleansing:      Do not scrub or use excessive force.    Wash hands with soap and water before and after dressing changes.    Prior to applying a clean dressing, cleanse wound with normal saline,    wound cleanser, or mild soap and water.     Ask your physician or nurse before getting the wound(s) wet in the shower.  Daily Wound management:    Keep weight off wounds and reposition every 2 hours.    Avoid standing for long periods of time.    Evaluate legs to the level of the heart or above for 30 minutes 4-5 times a day and/or when sitting.       When taking antibiotics take entire prescription as ordered by MD do not stop taking until medicine is all gone.     Documentation:  Compression: .   Offloading: .        Orders for this week (10/16/2024):  Right Breast- Wash with mild soap and water, rinse with saline, pat dry with 4x4  Apply anasept  and stimulen powder  to wound bed  Cover with versatel and ABD   Secure with paper tape  Change daily     Please dispense 30 day quantity when sending supplies     Follow up with BENJAMIN iHlario In 1 weeks in the wound care center  Call [phone] for any questions or concerns.      Treatment Note      Written Patient Dismissal Instructions Given          Electronically signed by JOSÉ Cyr CNP on 10/16/2024 at 9:57 AM

## 2024-10-17 ENCOUNTER — TELEPHONE (OUTPATIENT)
Dept: CARDIOLOGY CLINIC | Age: 49
End: 2024-10-17

## 2024-10-17 ENCOUNTER — OFFICE VISIT (OUTPATIENT)
Dept: ORTHOPEDIC SURGERY | Age: 49
End: 2024-10-17

## 2024-10-17 VITALS
OXYGEN SATURATION: 97 % | HEART RATE: 88 BPM | HEIGHT: 61 IN | RESPIRATION RATE: 14 BRPM | BODY MASS INDEX: 42.1 KG/M2 | WEIGHT: 223 LBS

## 2024-10-17 DIAGNOSIS — M18.12 ARTHRITIS OF CARPOMETACARPAL (CMC) JOINT OF LEFT THUMB: Primary | ICD-10-CM

## 2024-10-17 RX ORDER — TRIAMCINOLONE ACETONIDE 40 MG/ML
20 INJECTION, SUSPENSION INTRA-ARTICULAR; INTRAMUSCULAR ONCE
Status: COMPLETED | OUTPATIENT
Start: 2024-10-17 | End: 2024-10-17

## 2024-10-17 RX ADMIN — TRIAMCINOLONE ACETONIDE 20 MG: 40 INJECTION, SUSPENSION INTRA-ARTICULAR; INTRAMUSCULAR at 10:16

## 2024-10-17 NOTE — TELEPHONE ENCOUNTER
Called patient advised per ALONSO Estevez she is too stop the norvasc for now and let me know if her blood pressure starts gong high again.   Patient verbalized understanding of all information given.

## 2024-10-17 NOTE — WOUND CARE
10/17/2024 10:00  Per CPT code lookup for [employer]  No prior auth needed for CPT codes for  [id number]      Prior auth will be needed if patient needs a wound vac for CPT codes   [id number]        Electronically signed by Linda Larios on 10/17/2024 at 10:01 AM

## 2024-10-17 NOTE — PATIENT INSTRUCTIONS
Continue weight-bearing as tolerated.  Continue range of motion exercises as instructed.  Ice and elevate as needed.  Tylenol or Motrin for pain.  Injection given into the left thumb CMC.  Follow up as needed.    We are committed to providing you the best care possible.  If you receive a survey after visiting one of our offices, please take time to share your experience concerning your physician office visit.  These surveys are confidential and no health information about you is shared.  We are eager to improve for you and we are counting on your feedback to help make that happen.

## 2024-10-18 ASSESSMENT — ENCOUNTER SYMPTOMS
EYES NEGATIVE: 1
RESPIRATORY NEGATIVE: 1
GASTROINTESTINAL NEGATIVE: 1

## 2024-10-18 NOTE — PROGRESS NOTES
Review of Systems   Constitutional: Negative.    HENT: Negative.     Eyes: Negative.    Respiratory: Negative.     Cardiovascular: Negative.    Gastrointestinal: Negative.    Genitourinary: Negative.    Musculoskeletal:  Positive for arthralgias and myalgias.   Skin: Negative.  Negative for rash and wound.   Neurological: Negative.    Psychiatric/Behavioral: Negative.       Current HPI 10/18/2024: Noa Clark is a 49-year-old female that returns to the office today with recurrent left thumb pain at the base of her left thumb.  She states that the last injection she had in January did help up until the last month or so.  She rates her pain today at a 7/10.  She would like to have a repeat steroid injection in the thumb CMC joint if possible.      Previous HPI 1/11/2024:  Noa Clark is a 49 y.o. female that presents the office today complaining of left wrist/base of thumb pain has been going on since August.  She had an EMG done back in August and she has been having the pain at least since then.  She states that the pain is aching and she notices reduced  strength in that hand and not able to open jars or soda pop bottles.  Pain is worse with range of motion of the thumb.  She did recently have a fall which exacerbated her pain.  She did go and have x-rays which were negative.  She rates her pain at a 6 or 7/10 not improving with over-the-counter pain medications.  Past Medical History:   Diagnosis Date    Anxiety     Depression     GERD (gastroesophageal reflux disease)     H/O echocardiogram 10/19/2023    Ef 55-60%. Grade I diastolic dysfunction. Normal left ventricular size and Mild-to-moderate pulmonic regurgitation present    Headache     migraines    Hx of exercise stress test 10/24/2023    Exercise tolerance is good. Exercised for 6:00 min on Gomez protocol achieving workload of 7.00 METS.    Hyperlipidemia     Hypertension     Osteoarthritis of right knee     Palpitation     PONV (postoperative nausea and  Excision Method: Elliptical

## 2024-10-24 ENCOUNTER — PATIENT MESSAGE (OUTPATIENT)
Dept: BARIATRICS/WEIGHT MGMT | Age: 49
End: 2024-10-24

## 2024-10-25 NOTE — TELEPHONE ENCOUNTER
Office called pt - per verbal message from Love - this would not be related to [facility] care and pt needs to reach out to pcp - office gave message to pt - call ended.

## 2024-10-29 ENCOUNTER — HOSPITAL ENCOUNTER (OUTPATIENT)
Dept: WOUND CARE | Age: 49
Discharge: HOME | End: 2024-10-29
Attending: NURSE PRACTITIONER
Payer: COMMERCIAL

## 2024-10-29 VITALS
HEART RATE: 76 BPM | TEMPERATURE: 98.2 F | SYSTOLIC BLOOD PRESSURE: 114 MMHG | RESPIRATION RATE: 14 BRPM | DIASTOLIC BLOOD PRESSURE: 62 MMHG

## 2024-10-29 DIAGNOSIS — T21.21XA PARTIAL THICKNESS BURN OF RIGHT BREAST, INITIAL ENCOUNTER: Primary | ICD-10-CM

## 2024-10-29 PROCEDURE — 16020 DRESS/DEBRID P-THICK BURN S: CPT | Performed by: NURSE PRACTITIONER

## 2024-10-29 PROCEDURE — 6370000000 HC RX 637 (ALT 250 FOR IP): Performed by: NURSE PRACTITIONER

## 2024-10-29 PROCEDURE — 16020 DRESS/DEBRID P-THICK BURN S: CPT

## 2024-10-29 RX ORDER — CLOBETASOL PROPIONATE 0.5 MG/G
OINTMENT TOPICAL ONCE
Status: COMPLETED | OUTPATIENT
Start: 2024-10-29 | End: 2024-10-29

## 2024-10-29 RX ORDER — SODIUM CHLOR/HYPOCHLOROUS ACID 0.033 %
SOLUTION, IRRIGATION IRRIGATION ONCE
OUTPATIENT
Start: 2024-10-29 | End: 2024-10-29

## 2024-10-29 RX ORDER — GENTAMICIN SULFATE 1 MG/G
OINTMENT TOPICAL ONCE
Status: COMPLETED | OUTPATIENT
Start: 2024-10-29 | End: 2024-10-29

## 2024-10-29 RX ORDER — GINSENG 100 MG
CAPSULE ORAL ONCE
OUTPATIENT
Start: 2024-10-29 | End: 2024-10-29

## 2024-10-29 RX ORDER — LIDOCAINE 50 MG/G
OINTMENT TOPICAL ONCE
OUTPATIENT
Start: 2024-10-29 | End: 2024-10-29

## 2024-10-29 RX ORDER — NEOMYCIN/BACITRACIN/POLYMYXINB 3.5-400-5K
OINTMENT (GRAM) TOPICAL ONCE
OUTPATIENT
Start: 2024-10-29 | End: 2024-10-29

## 2024-10-29 RX ORDER — CLOBETASOL PROPIONATE 0.5 MG/G
OINTMENT TOPICAL ONCE
OUTPATIENT
Start: 2024-10-29 | End: 2024-10-29

## 2024-10-29 RX ORDER — GENTAMICIN SULFATE 1 MG/G
OINTMENT TOPICAL ONCE
OUTPATIENT
Start: 2024-10-29 | End: 2024-10-29

## 2024-10-29 RX ORDER — LIDOCAINE 40 MG/G
CREAM TOPICAL ONCE
OUTPATIENT
Start: 2024-10-29 | End: 2024-10-29

## 2024-10-29 RX ORDER — LIDOCAINE HYDROCHLORIDE 20 MG/ML
JELLY TOPICAL ONCE
OUTPATIENT
Start: 2024-10-29 | End: 2024-10-29

## 2024-10-29 RX ORDER — BETAMETHASONE DIPROPIONATE 0.5 MG/G
CREAM TOPICAL ONCE
OUTPATIENT
Start: 2024-10-29 | End: 2024-10-29

## 2024-10-29 RX ORDER — BACITRACIN ZINC AND POLYMYXIN B SULFATE 500; 1000 [USP'U]/G; [USP'U]/G
OINTMENT TOPICAL ONCE
OUTPATIENT
Start: 2024-10-29 | End: 2024-10-29

## 2024-10-29 RX ORDER — LIDOCAINE HYDROCHLORIDE 40 MG/ML
SOLUTION TOPICAL ONCE
OUTPATIENT
Start: 2024-10-29 | End: 2024-10-29

## 2024-10-29 RX ORDER — TRIAMCINOLONE ACETONIDE 1 MG/G
OINTMENT TOPICAL ONCE
OUTPATIENT
Start: 2024-10-29 | End: 2024-10-29

## 2024-10-29 RX ORDER — MUPIROCIN 20 MG/G
OINTMENT TOPICAL ONCE
OUTPATIENT
Start: 2024-10-29 | End: 2024-10-29

## 2024-10-29 RX ORDER — SILVER SULFADIAZINE 10 MG/G
CREAM TOPICAL ONCE
OUTPATIENT
Start: 2024-10-29 | End: 2024-10-29

## 2024-10-29 RX ADMIN — CLOBETASOL PROPIONATE: 0.5 OINTMENT TOPICAL at 09:11

## 2024-10-29 RX ADMIN — GENTAMICIN SULFATE: 1 OINTMENT TOPICAL at 09:12

## 2024-10-29 ASSESSMENT — PAIN DESCRIPTION - DESCRIPTORS: DESCRIPTORS: SORE;TENDER;ACHING

## 2024-10-29 ASSESSMENT — PAIN SCALES - GENERAL: PAINLEVEL_OUTOF10: 4

## 2024-10-29 ASSESSMENT — PAIN DESCRIPTION - LOCATION: LOCATION: BREAST

## 2024-10-29 ASSESSMENT — PAIN DESCRIPTION - ORIENTATION: ORIENTATION: RIGHT

## 2024-10-29 NOTE — PATIENT INSTRUCTIONS
PHYSICIAN ORDERS AND DISCHARGE INSTRUCTIONS  NOTE: Upon discharge from the Wound Center, you will receive a patient experience survey via E-mail. We would be grateful if you would take the time to fill this survey out.  Wound care order history:   TONI's   Right       Left    Date    Vascular studies/Intervention: .     Cultures: .               Antibiotics: .               HbA1c:  .               Grafts:  .   Juxta Lites & Lymph Pumps:  Continuing wound care orders and information:              Residence: .              Continue home health care with:.    Your wound-care supplies will be provided by: .              Pharmacy: .  Wound cleansing:      Do not scrub or use excessive force.    Wash hands with soap and water before and after dressing changes.    Prior to applying a clean dressing, cleanse wound with normal saline,    wound cleanser, or mild soap and water.     Ask your physician or nurse before getting the wound(s) wet in the shower.  Daily Wound management:    Keep weight off wounds and reposition every 2 hours.    Avoid standing for long periods of time.    Evaluate legs to the level of the heart or above for 30 minutes 4-5 times a day and/or when sitting.       When taking antibiotics take entire prescription as ordered by MD do not stop taking until medicine is all gone.     Documentation:  Compression: .   Offloading: .        Orders for this week (10/29/2024):  Right Breast- Wash with mild soap and water, rinse with saline, pat dry with 4x4  Apply gentamicin, clobetasol and stimulen powder  to wound bed  Cover with versatel and ABD   Secure with paper tape  Change daily     Please dispense 30 day quantity when sending supplies     Follow up with BENJAMIN Hilario In 1 weeks in the wound care center  Call [phone] for any questions or concerns.

## 2024-10-29 NOTE — PLAN OF CARE
Problem: Wound:  Goal: Will show signs of wound healing; wound closure and no evidence of infection  Description: Will show signs of wound healing; wound closure and no evidence of infection  Outcome: Progressing  Note: See flowsheet     Problem: Wound:  Intervention: Assess pain status  Note: See flowsheet  Intervention: Assess wound size, appearance and drainage  Note: See flowsheet
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
## 2024-10-30 ENCOUNTER — TELEPHONE (OUTPATIENT)
Dept: BARIATRICS/WEIGHT MGMT | Age: 49
End: 2024-10-30

## 2024-10-30 RX ORDER — FAMOTIDINE 20 MG/1
20 TABLET, FILM COATED ORAL 2 TIMES DAILY
Qty: 60 TABLET | Refills: 5 | Status: SHIPPED | OUTPATIENT
Start: 2024-10-30

## 2024-10-30 RX ORDER — SENNOSIDES A AND B 8.6 MG/1
1 TABLET, FILM COATED ORAL 2 TIMES DAILY
Qty: 60 TABLET | Refills: 11 | Status: SHIPPED | OUTPATIENT
Start: 2024-10-30 | End: 2025-10-30

## 2024-11-05 ENCOUNTER — HOSPITAL ENCOUNTER (OUTPATIENT)
Dept: WOUND CARE | Age: 49
Discharge: HOME | End: 2024-11-05
Attending: NURSE PRACTITIONER
Payer: COMMERCIAL

## 2024-11-05 DIAGNOSIS — T21.21XA PARTIAL THICKNESS BURN OF RIGHT BREAST, INITIAL ENCOUNTER: Primary | ICD-10-CM

## 2024-11-05 PROCEDURE — 16020 DRESS/DEBRID P-THICK BURN S: CPT | Performed by: NURSE PRACTITIONER

## 2024-11-05 PROCEDURE — 6370000000 HC RX 637 (ALT 250 FOR IP): Performed by: NURSE PRACTITIONER

## 2024-11-05 PROCEDURE — 16020 DRESS/DEBRID P-THICK BURN S: CPT

## 2024-11-05 RX ORDER — BACITRACIN ZINC AND POLYMYXIN B SULFATE 500; 1000 [USP'U]/G; [USP'U]/G
OINTMENT TOPICAL ONCE
OUTPATIENT
Start: 2024-11-05 | End: 2024-11-05

## 2024-11-05 RX ORDER — GINSENG 100 MG
CAPSULE ORAL ONCE
OUTPATIENT
Start: 2024-11-05 | End: 2024-11-05

## 2024-11-05 RX ORDER — BETAMETHASONE DIPROPIONATE 0.5 MG/G
CREAM TOPICAL ONCE
OUTPATIENT
Start: 2024-11-05 | End: 2024-11-05

## 2024-11-05 RX ORDER — CLOBETASOL PROPIONATE 0.5 MG/G
OINTMENT TOPICAL ONCE
Status: COMPLETED | OUTPATIENT
Start: 2024-11-05 | End: 2024-11-05

## 2024-11-05 RX ORDER — LIDOCAINE HYDROCHLORIDE 20 MG/ML
JELLY TOPICAL ONCE
OUTPATIENT
Start: 2024-11-05 | End: 2024-11-05

## 2024-11-05 RX ORDER — LIDOCAINE 40 MG/G
CREAM TOPICAL ONCE
OUTPATIENT
Start: 2024-11-05 | End: 2024-11-05

## 2024-11-05 RX ORDER — SILVER SULFADIAZINE 10 MG/G
CREAM TOPICAL ONCE
OUTPATIENT
Start: 2024-11-05 | End: 2024-11-05

## 2024-11-05 RX ORDER — LIDOCAINE 50 MG/G
OINTMENT TOPICAL ONCE
OUTPATIENT
Start: 2024-11-05 | End: 2024-11-05

## 2024-11-05 RX ORDER — CLOBETASOL PROPIONATE 0.5 MG/G
OINTMENT TOPICAL ONCE
OUTPATIENT
Start: 2024-11-05 | End: 2024-11-05

## 2024-11-05 RX ORDER — LIDOCAINE HYDROCHLORIDE 40 MG/ML
SOLUTION TOPICAL ONCE
OUTPATIENT
Start: 2024-11-05 | End: 2024-11-05

## 2024-11-05 RX ORDER — TRIAMCINOLONE ACETONIDE 1 MG/G
OINTMENT TOPICAL ONCE
OUTPATIENT
Start: 2024-11-05 | End: 2024-11-05

## 2024-11-05 RX ORDER — MUPIROCIN 20 MG/G
OINTMENT TOPICAL ONCE
OUTPATIENT
Start: 2024-11-05 | End: 2024-11-05

## 2024-11-05 RX ORDER — SODIUM CHLOR/HYPOCHLOROUS ACID 0.033 %
SOLUTION, IRRIGATION IRRIGATION ONCE
OUTPATIENT
Start: 2024-11-05 | End: 2024-11-05

## 2024-11-05 RX ORDER — GENTAMICIN SULFATE 1 MG/G
OINTMENT TOPICAL ONCE
Status: COMPLETED | OUTPATIENT
Start: 2024-11-05 | End: 2024-11-05

## 2024-11-05 RX ORDER — NEOMYCIN/BACITRACIN/POLYMYXINB 3.5-400-5K
OINTMENT (GRAM) TOPICAL ONCE
OUTPATIENT
Start: 2024-11-05 | End: 2024-11-05

## 2024-11-05 RX ORDER — GENTAMICIN SULFATE 1 MG/G
OINTMENT TOPICAL ONCE
OUTPATIENT
Start: 2024-11-05 | End: 2024-11-05

## 2024-11-05 RX ADMIN — CLOBETASOL PROPIONATE: 0.5 OINTMENT TOPICAL at 09:49

## 2024-11-05 RX ADMIN — GENTAMICIN SULFATE: 1 OINTMENT TOPICAL at 09:49

## 2024-11-05 NOTE — PATIENT INSTRUCTIONS
PHYSICIAN ORDERS AND DISCHARGE INSTRUCTIONS  NOTE: Upon discharge from the Wound Center, you will receive a patient experience survey via E-mail. We would be grateful if you would take the time to fill this survey out.  Wound care order history:   TONI's   Right       Left    Date    Vascular studies/Intervention: .     Cultures: .               Antibiotics: .               HbA1c:  .               Grafts:  .   Juxta Lites & Lymph Pumps:  Continuing wound care orders and information:              Residence: .              Continue home health care with:.    Your wound-care supplies will be provided by: .              Pharmacy: .  Wound cleansing:      Do not scrub or use excessive force.    Wash hands with soap and water before and after dressing changes.    Prior to applying a clean dressing, cleanse wound with normal saline,    wound cleanser, or mild soap and water.     Ask your physician or nurse before getting the wound(s) wet in the shower.  Daily Wound management:    Keep weight off wounds and reposition every 2 hours.    Avoid standing for long periods of time.    Evaluate legs to the level of the heart or above for 30 minutes 4-5 times a day and/or when sitting.       When taking antibiotics take entire prescription as ordered by MD do not stop taking until medicine is all gone.     Documentation:  Compression: .   Offloading: .        Orders for this week (11/5/2024):  Right Breast- Wash with mild soap and water, rinse with saline, pat dry with 4x4  Apply gentamicin, clobetasol and stimulen powder  to wound bed  Cover with versatel and ABD   Secure with paper tape  Change daily     Please dispense 30 day quantity when sending supplies     Follow up with BENJAMIN Hilario In 1 weeks in the wound care center  Call [phone] for any questions or concerns.
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
## 2024-11-18 ENCOUNTER — OFFICE VISIT (OUTPATIENT)
Dept: BARIATRICS/WEIGHT MGMT | Age: 49
End: 2024-11-18

## 2024-11-18 VITALS
WEIGHT: 210.2 LBS | HEIGHT: 61 IN | OXYGEN SATURATION: 99 % | BODY MASS INDEX: 39.69 KG/M2 | HEART RATE: 75 BPM | DIASTOLIC BLOOD PRESSURE: 78 MMHG | SYSTOLIC BLOOD PRESSURE: 122 MMHG

## 2024-11-18 DIAGNOSIS — Z98.84 STATUS POST BARIATRIC SURGERY: Primary | ICD-10-CM

## 2024-11-18 PROCEDURE — 99024 POSTOP FOLLOW-UP VISIT: CPT | Performed by: NURSE PRACTITIONER

## 2024-11-18 ASSESSMENT — PATIENT HEALTH QUESTIONNAIRE - PHQ9
SUM OF ALL RESPONSES TO PHQ9 QUESTIONS 1 & 2: 0
SUM OF ALL RESPONSES TO PHQ QUESTIONS 1-9: 0
SUM OF ALL RESPONSES TO PHQ QUESTIONS 1-9: 0
1. LITTLE INTEREST OR PLEASURE IN DOING THINGS: NOT AT ALL
SUM OF ALL RESPONSES TO PHQ QUESTIONS 1-9: 0
2. FEELING DOWN, DEPRESSED OR HOPELESS: NOT AT ALL
SUM OF ALL RESPONSES TO PHQ QUESTIONS 1-9: 0
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
## 2024-12-16 ENCOUNTER — HOSPITAL ENCOUNTER (OUTPATIENT)
Age: 49
Discharge: HOME | End: 2024-12-16
Payer: COMMERCIAL

## 2024-12-16 DIAGNOSIS — Z98.84 STATUS POST BARIATRIC SURGERY: ICD-10-CM

## 2024-12-16 LAB
ALBUMIN SERPL-MCNC: 3.6 G/DL (ref 3.4–5)
ALBUMIN/GLOB SERPL: 1.3 {RATIO} (ref 1.1–2.2)
ALP SERPL-CCNC: 63 U/L (ref 40–129)
ALT SERPL-CCNC: 10 U/L (ref 10–40)
ANION GAP SERPL CALCULATED.3IONS-SCNC: 12 MMOL/L (ref 4–16)
AST SERPL-CCNC: 19 U/L (ref 15–37)
BASOPHILS # BLD: 0.07 K/UL
BASOPHILS NFR BLD: 1 % (ref 0–1)
BILIRUB SERPL-MCNC: 0.4 MG/DL (ref 0–1)
BUN SERPL-MCNC: 10 MG/DL (ref 6–23)
CALCIUM SERPL-MCNC: 9 MG/DL (ref 8.3–10.6)
CHLORIDE SERPL-SCNC: 106 MMOL/L (ref 99–110)
CO2 SERPL-SCNC: 25 MMOL/L (ref 21–32)
CREAT SERPL-MCNC: 0.7 MG/DL (ref 0.6–1.1)
EOSINOPHIL # BLD: 0.41 K/UL
EOSINOPHILS RELATIVE PERCENT: 7 % (ref 0–3)
ERYTHROCYTE [DISTWIDTH] IN BLOOD BY AUTOMATED COUNT: 14.5 % (ref 11.7–14.9)
EST. AVERAGE GLUCOSE BLD GHB EST-MCNC: 113 MG/DL
FOLATE SERPL-MCNC: 8.2 NG/ML (ref 4.8–24.2)
GFR, ESTIMATED: >90 ML/MIN/1.73M2
GLUCOSE SERPL-MCNC: 90 MG/DL (ref 70–99)
HBA1C MFR BLD: 5.6 % (ref 4.2–6.3)
HCT VFR BLD AUTO: 43.7 % (ref 37–47)
HGB BLD-MCNC: 13.3 G/DL (ref 12.5–16)
IMM GRANULOCYTES # BLD AUTO: 0.03 K/UL
IMM GRANULOCYTES NFR BLD: 1 %
IRON SATN MFR SERPL: 12 % (ref 15–50)
IRON SERPL-MCNC: 39 UG/DL (ref 37–145)
LYMPHOCYTES NFR BLD: 2.18 K/UL
LYMPHOCYTES RELATIVE PERCENT: 37 % (ref 24–44)
MCH RBC QN AUTO: 28.4 PG (ref 27–31)
MCHC RBC AUTO-ENTMCNC: 30.4 G/DL (ref 32–36)
MCV RBC AUTO: 93.4 FL (ref 78–100)
MONOCYTES NFR BLD: 0.4 K/UL
MONOCYTES NFR BLD: 7 % (ref 0–4)
NEUTROPHILS NFR BLD: 47 % (ref 36–66)
NEUTS SEG NFR BLD: 2.75 K/UL
PLATELET # BLD AUTO: 286 K/UL (ref 140–440)
PMV BLD AUTO: 11.4 FL (ref 7.5–11.1)
POTASSIUM SERPL-SCNC: 4.3 MMOL/L (ref 3.5–5.1)
PROT SERPL-MCNC: 6.3 G/DL (ref 6.4–8.2)
RBC # BLD AUTO: 4.68 M/UL (ref 4.2–5.4)
SODIUM SERPL-SCNC: 143 MMOL/L (ref 135–145)
TIBC SERPL-MCNC: 336 UG/DL (ref 260–445)
UNSATURATED IRON BINDING CAPACITY: 297 UG/DL (ref 110–370)
VIT B12 SERPL-MCNC: 478 PG/ML (ref 211–911)
WBC OTHER # BLD: 5.8 K/UL (ref 4–10.5)

## 2024-12-16 PROCEDURE — 83540 ASSAY OF IRON: CPT

## 2024-12-16 PROCEDURE — 82607 VITAMIN B-12: CPT

## 2024-12-16 PROCEDURE — 83550 IRON BINDING TEST: CPT

## 2024-12-16 PROCEDURE — 83036 HEMOGLOBIN GLYCOSYLATED A1C: CPT

## 2024-12-16 PROCEDURE — 80053 COMPREHEN METABOLIC PANEL: CPT

## 2024-12-16 PROCEDURE — 36415 COLL VENOUS BLD VENIPUNCTURE: CPT

## 2024-12-16 PROCEDURE — 85025 COMPLETE CBC W/AUTO DIFF WBC: CPT

## 2024-12-16 PROCEDURE — 82746 ASSAY OF FOLIC ACID SERUM: CPT

## 2024-12-18 ENCOUNTER — OFFICE VISIT (OUTPATIENT)
Dept: BARIATRICS/WEIGHT MGMT | Age: 49
End: 2024-12-18

## 2024-12-18 VITALS
SYSTOLIC BLOOD PRESSURE: 110 MMHG | DIASTOLIC BLOOD PRESSURE: 78 MMHG | HEIGHT: 61 IN | HEART RATE: 83 BPM | OXYGEN SATURATION: 97 % | BODY MASS INDEX: 38.71 KG/M2 | WEIGHT: 205 LBS

## 2024-12-18 DIAGNOSIS — Z98.84 STATUS POST BARIATRIC SURGERY: Primary | ICD-10-CM

## 2024-12-18 PROCEDURE — 99024 POSTOP FOLLOW-UP VISIT: CPT | Performed by: NURSE PRACTITIONER
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
## 2025-02-07 ENCOUNTER — HOSPITAL ENCOUNTER (OUTPATIENT)
Dept: MRI IMAGING | Age: 50
Discharge: HOME | End: 2025-02-07
Payer: COMMERCIAL

## 2025-02-07 DIAGNOSIS — R15.9 FULL INCONTINENCE OF FECES: ICD-10-CM

## 2025-02-07 DIAGNOSIS — N39.42 URINARY INCONTINENCE WITHOUT SENSORY AWARENESS: ICD-10-CM

## 2025-02-07 PROCEDURE — 6360000004 HC RX CONTRAST MEDICATION: Performed by: STUDENT IN AN ORGANIZED HEALTH CARE EDUCATION/TRAINING PROGRAM

## 2025-02-07 PROCEDURE — 72158 MRI LUMBAR SPINE W/O & W/DYE: CPT

## 2025-02-07 PROCEDURE — A9579 GAD-BASE MR CONTRAST NOS,1ML: HCPCS | Performed by: STUDENT IN AN ORGANIZED HEALTH CARE EDUCATION/TRAINING PROGRAM

## 2025-02-07 RX ADMIN — GADOTERIDOL 20 ML: 279.3 INJECTION, SOLUTION INTRAVENOUS at 18:33

## 2025-02-13 ENCOUNTER — OFFICE VISIT (OUTPATIENT)
Dept: ORTHOPEDIC SURGERY | Age: 50
End: 2025-02-13
Payer: COMMERCIAL

## 2025-02-13 VITALS
RESPIRATION RATE: 14 BRPM | HEART RATE: 93 BPM | WEIGHT: 205 LBS | OXYGEN SATURATION: 98 % | BODY MASS INDEX: 38.71 KG/M2 | HEIGHT: 61 IN

## 2025-02-13 DIAGNOSIS — T84.84XA PAIN DUE TO TOTAL RIGHT KNEE REPLACEMENT, INITIAL ENCOUNTER (HCC): Primary | ICD-10-CM

## 2025-02-13 DIAGNOSIS — Z96.651 PAIN DUE TO TOTAL RIGHT KNEE REPLACEMENT, INITIAL ENCOUNTER (HCC): Primary | ICD-10-CM

## 2025-02-13 PROCEDURE — 1036F TOBACCO NON-USER: CPT | Performed by: PHYSICIAN ASSISTANT

## 2025-02-13 PROCEDURE — 99213 OFFICE O/P EST LOW 20 MIN: CPT | Performed by: PHYSICIAN ASSISTANT

## 2025-02-13 PROCEDURE — G8417 CALC BMI ABV UP PARAM F/U: HCPCS | Performed by: PHYSICIAN ASSISTANT

## 2025-02-13 PROCEDURE — G8427 DOCREV CUR MEDS BY ELIG CLIN: HCPCS | Performed by: PHYSICIAN ASSISTANT

## 2025-02-13 ASSESSMENT — ENCOUNTER SYMPTOMS
RESPIRATORY NEGATIVE: 1
EYES NEGATIVE: 1
GASTROINTESTINAL NEGATIVE: 1

## 2025-02-13 NOTE — PATIENT INSTRUCTIONS
Patient may follow-up as needed.  Prescription sent into [facility] for topical cream for the right knee.    For any dental procedures or other invasive medical procedures you will need to take antibiotics prior to your procedure.  Call our office prior to any dental procedure and we will send in a prescription for you.  Antibiotics for other medical procedures are usually taken care of by the doctor or clinician performing the procedure.    If you have any question post operatively. Please contact office to speak with [facility].     We are committed to providing you the best care possible.  If you receive a survey after visiting one of our offices, please take time to share your experience concerning your physician office visit.  These surveys are confidential and no health information about you is shared.  We are eager to improve for you and we are counting on your feedback to help make that happen.

## 2025-02-13 NOTE — PROGRESS NOTES
Review of Systems   Constitutional: Negative.    HENT: Negative.     Eyes: Negative.    Respiratory: Negative.     Cardiovascular: Negative.    Gastrointestinal: Negative.    Genitourinary: Negative.    Musculoskeletal:  Positive for arthralgias and myalgias.   Skin: Negative.    Neurological: Negative.    Psychiatric/Behavioral: Negative.           HPI:Noa Clark is a 50 y.o. female who presents the office today to have her right knee evaluated.  She had a right total knee done about 11 months ago.  She states that she has pain in the right knee still along the anterior lateral aspect of the tibia and patella.  At times she feels like its sharp pain.  She does not feel unstable and she does not feel any significant swelling in the knee.  She has not had any falls.  She has lost about 70 pounds but she is unable to take anti-inflammatory medication now because of the weight loss surgery that she had.      Past Medical History:   Diagnosis Date    Anxiety     Depression     GERD (gastroesophageal reflux disease)     H/O echocardiogram 10/19/2023    Ef 55-60%. Grade I diastolic dysfunction. Normal left ventricular size and Mild-to-moderate pulmonic regurgitation present    Headache     migraines    Hx of exercise stress test 10/24/2023    Exercise tolerance is good. Exercised for 6:00 min on Gomez protocol achieving workload of 7.00 METS.    Hyperlipidemia     Hypertension     Osteoarthritis of right knee     Palpitation     PONV (postoperative nausea and vomiting)     Thyroid disease     Vitamin D deficiency        Past Surgical History:   Procedure Laterality Date    BREAST REDUCTION SURGERY Bilateral 2007    HYSTERECTOMY (CERVIX STATUS UNKNOWN)  2007    JOINT REPLACEMENT      KNEE ARTHROSCOPY Right 05/24/2022    RIGHT KNEE ARTHROSCOPY WITH CHONDROPLASTY performed by Randell Isaac DO at [facility] OR    SLEEVE GASTRECTOMY N/A 09/23/2024    ROBOTIC SLEEVE GASTRECTOMY performed by Cristhian Anand II, MD at [facility] OR

## 2025-02-14 ENCOUNTER — TELEPHONE (OUTPATIENT)
Dept: ORTHOPEDIC SURGERY | Age: 50
End: 2025-02-14

## 2025-02-21 ENCOUNTER — TELEPHONE (OUTPATIENT)
Dept: NEUROSURGERY | Age: 50
End: 2025-02-21

## 2025-02-21 NOTE — TELEPHONE ENCOUNTER
[facility] called and asked if we received the referral for this pt I checked the chart and yes we did and she is scheduled for 3/25/2025. She stated understanding.

## 2025-03-12 ENCOUNTER — TELEPHONE (OUTPATIENT)
Dept: NEUROSURGERY | Age: 50
End: 2025-03-12

## 2025-03-12 ENCOUNTER — TELEPHONE (OUTPATIENT)
Dept: BARIATRICS/WEIGHT MGMT | Age: 50
End: 2025-03-12

## 2025-03-12 NOTE — TELEPHONE ENCOUNTER
Staff called pt to inform her that before her appt on Tuesday, she would need to get the necessary labs completed. If pt cannot get these labs completed, then her appt would need to be r/s. Pt needs to complete the remainder of labs today.

## 2025-03-14 ENCOUNTER — HOSPITAL ENCOUNTER (OUTPATIENT)
Age: 50
Discharge: HOME | End: 2025-03-14
Payer: COMMERCIAL

## 2025-03-14 DIAGNOSIS — Z98.84 STATUS POST BARIATRIC SURGERY: ICD-10-CM

## 2025-03-14 LAB
25(OH)D3 SERPL-MCNC: 20 NG/ML (ref 30–150)
CHOLEST SERPL-MCNC: 267 MG/DL (ref 125–199)
HDLC SERPL-MCNC: 52 MG/DL
LDLC SERPL CALC-MCNC: 193 MG/DL
MAGNESIUM SERPL-MCNC: 2 MG/DL (ref 1.8–2.4)
TRIGL SERPL-MCNC: 110 MG/DL
TSH SERPL DL<=0.05 MIU/L-ACNC: 0.95 UIU/ML (ref 0.27–4.2)

## 2025-03-14 PROCEDURE — 36415 COLL VENOUS BLD VENIPUNCTURE: CPT

## 2025-03-14 PROCEDURE — 82306 VITAMIN D 25 HYDROXY: CPT

## 2025-03-14 PROCEDURE — 84443 ASSAY THYROID STIM HORMONE: CPT

## 2025-03-14 PROCEDURE — 83735 ASSAY OF MAGNESIUM: CPT

## 2025-03-14 PROCEDURE — 84630 ASSAY OF ZINC: CPT

## 2025-03-14 PROCEDURE — 80061 LIPID PANEL: CPT

## 2025-03-14 PROCEDURE — 84425 ASSAY OF VITAMIN B-1: CPT

## 2025-03-16 LAB — ZINC SERPL-MCNC: 63.6 UG/DL (ref 60–120)

## 2025-03-18 LAB — VIT B1 PYROPHOSHATE BLD-SCNC: 122 NMOL/L (ref 70–180)

## 2025-03-25 ENCOUNTER — TELEPHONE (OUTPATIENT)
Dept: NEUROSURGERY | Age: 50
End: 2025-03-25

## 2025-03-25 ENCOUNTER — OFFICE VISIT (OUTPATIENT)
Dept: NEUROSURGERY | Age: 50
End: 2025-03-25
Payer: COMMERCIAL

## 2025-03-25 VITALS
HEART RATE: 71 BPM | WEIGHT: 199 LBS | DIASTOLIC BLOOD PRESSURE: 68 MMHG | BODY MASS INDEX: 37.6 KG/M2 | SYSTOLIC BLOOD PRESSURE: 128 MMHG | OXYGEN SATURATION: 98 %

## 2025-03-25 DIAGNOSIS — M54.50 CHRONIC BILATERAL LOW BACK PAIN WITHOUT SCIATICA: Primary | ICD-10-CM

## 2025-03-25 DIAGNOSIS — G89.29 CHRONIC BILATERAL LOW BACK PAIN WITHOUT SCIATICA: Primary | ICD-10-CM

## 2025-03-25 PROCEDURE — G8417 CALC BMI ABV UP PARAM F/U: HCPCS | Performed by: PHYSICIAN ASSISTANT

## 2025-03-25 PROCEDURE — G8427 DOCREV CUR MEDS BY ELIG CLIN: HCPCS | Performed by: PHYSICIAN ASSISTANT

## 2025-03-25 PROCEDURE — 1036F TOBACCO NON-USER: CPT | Performed by: PHYSICIAN ASSISTANT

## 2025-03-25 PROCEDURE — 99203 OFFICE O/P NEW LOW 30 MIN: CPT | Performed by: PHYSICIAN ASSISTANT

## 2025-03-25 NOTE — PROGRESS NOTES
Neurosurgery Office Note    Chief Complaint: Low back pain, bilateral leg pain    HPI:  50 y.o. 1975  Who presents today with complaints of several years of low back pain across her low back with radiation laterally into her ankles.  She states that this pain has worsened over the past 6 months.  She does have some numbness and tingling with this pain but is not constant.  She states that close to 20 years ago she had spine injections by a pain doctor in [place].  She does not believe he is practicing anymore.  She does recall those injections helping, and what kind of injections they were.  Currently takes Tylenol and Zanaflex to moderate relief.  She states she recently had gastric bypass surgery and is unable to take any NSAIDs.  She has not had any formal physical therapy.  She states that a couple years ago she was having urinary and bowel incontinence but that has resolved.  Has no saddle paresthesias.  VAS 4. Patient is on ASA 81mg. PMHx and PSHX listed below.                      Past Medical and Surgical History:       Diagnosis Date    Anxiety     Depression     GERD (gastroesophageal reflux disease)     H/O echocardiogram 10/19/2023    Ef 55-60%. Grade I diastolic dysfunction. Normal left ventricular size and Mild-to-moderate pulmonic regurgitation present    Headache     migraines    Hx of exercise stress test 10/24/2023    Exercise tolerance is good. Exercised for 6:00 min on Gomez protocol achieving workload of 7.00 METS.    Hyperlipidemia     Hypertension     Osteoarthritis of right knee     Palpitation     PONV (postoperative nausea and vomiting)     Thyroid disease     Vitamin D deficiency          Procedure Laterality Date    BREAST REDUCTION SURGERY Bilateral 2007    HYSTERECTOMY (CERVIX STATUS UNKNOWN)  2007    JOINT REPLACEMENT      KNEE ARTHROSCOPY Right 05/24/2022    RIGHT KNEE ARTHROSCOPY WITH CHONDROPLASTY performed by Randell Isaac DO at [facility] OR    SLEEVE GASTRECTOMY N/A

## 2025-03-25 NOTE — TELEPHONE ENCOUNTER
Called Pt jayson Yo to let her know that she will not be writing the script for Neurontin since it will interact with another medication she is currently on.

## 2025-03-25 NOTE — PATIENT INSTRUCTIONS
A referral has been placed for physical therapy. They will call you to schedule.  [facility]  [address]  [place] [address]  [phone]     Follow up with Dr. Silverio in four to six weeks     Imaging or labs has been ordered for you.   [facility]  [address] [place] [address]  X-rays do not need an appointment.  To Schedule Bone Scan, CT or MRI  Call at Central [phone]

## 2025-03-26 ENCOUNTER — OFFICE VISIT (OUTPATIENT)
Dept: BARIATRICS/WEIGHT MGMT | Age: 50
End: 2025-03-26
Payer: COMMERCIAL

## 2025-03-26 VITALS
BODY MASS INDEX: 35.23 KG/M2 | HEIGHT: 61 IN | SYSTOLIC BLOOD PRESSURE: 122 MMHG | OXYGEN SATURATION: 98 % | WEIGHT: 186.6 LBS | HEART RATE: 88 BPM | DIASTOLIC BLOOD PRESSURE: 82 MMHG

## 2025-03-26 DIAGNOSIS — Z98.84 STATUS POST BARIATRIC SURGERY: Primary | ICD-10-CM

## 2025-03-26 PROCEDURE — 1036F TOBACCO NON-USER: CPT | Performed by: NURSE PRACTITIONER

## 2025-03-26 PROCEDURE — 99213 OFFICE O/P EST LOW 20 MIN: CPT | Performed by: NURSE PRACTITIONER

## 2025-03-26 PROCEDURE — G8427 DOCREV CUR MEDS BY ELIG CLIN: HCPCS | Performed by: NURSE PRACTITIONER

## 2025-03-26 PROCEDURE — G8417 CALC BMI ABV UP PARAM F/U: HCPCS | Performed by: NURSE PRACTITIONER

## 2025-03-26 ASSESSMENT — ENCOUNTER SYMPTOMS: CONSTIPATION: 1
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
## 2025-03-27 ENCOUNTER — OFFICE VISIT (OUTPATIENT)
Dept: ORTHOPEDIC SURGERY | Age: 50
End: 2025-03-27
Payer: COMMERCIAL

## 2025-03-27 VITALS
HEART RATE: 86 BPM | HEIGHT: 61 IN | OXYGEN SATURATION: 99 % | WEIGHT: 186 LBS | RESPIRATION RATE: 14 BRPM | BODY MASS INDEX: 35.12 KG/M2

## 2025-03-27 DIAGNOSIS — Z96.651 HISTORY OF TOTAL KNEE ARTHROPLASTY, RIGHT: ICD-10-CM

## 2025-03-27 DIAGNOSIS — Z96.651 PAIN DUE TO TOTAL RIGHT KNEE REPLACEMENT, INITIAL ENCOUNTER: Primary | ICD-10-CM

## 2025-03-27 DIAGNOSIS — T84.84XA PAIN DUE TO TOTAL RIGHT KNEE REPLACEMENT, INITIAL ENCOUNTER: Primary | ICD-10-CM

## 2025-03-27 PROCEDURE — G8427 DOCREV CUR MEDS BY ELIG CLIN: HCPCS | Performed by: PHYSICIAN ASSISTANT

## 2025-03-27 PROCEDURE — 99213 OFFICE O/P EST LOW 20 MIN: CPT | Performed by: PHYSICIAN ASSISTANT

## 2025-03-27 PROCEDURE — 1036F TOBACCO NON-USER: CPT | Performed by: PHYSICIAN ASSISTANT

## 2025-03-27 PROCEDURE — G8417 CALC BMI ABV UP PARAM F/U: HCPCS | Performed by: PHYSICIAN ASSISTANT

## 2025-03-27 ASSESSMENT — ENCOUNTER SYMPTOMS
GASTROINTESTINAL NEGATIVE: 1
RESPIRATORY NEGATIVE: 1
EYES NEGATIVE: 1

## 2025-03-27 NOTE — PATIENT INSTRUCTIONS
Patient may follow-up at his 5 year carri  Continue working on range of motion and strengthening as needed.  Call office with any questions.  For any dental procedures or other invasive medical procedures you will need to take antibiotics prior to your procedure.  Call our office prior to any dental procedure and we will send in a prescription for you.  Antibiotics for other medical procedures are usually taken care of by the doctor or clinician performing the procedure.    If you have any question post operatively. Please contact office to speak with Melissa Calvertator.     We are committed to providing you the best care possible.  If you receive a survey after visiting one of our offices, please take time to share your experience concerning your physician office visit.  These surveys are confidential and no health information about you is shared.  We are eager to improve for you and we are counting on your feedback to help make that happen.

## 2025-03-27 NOTE — PROGRESS NOTES
Review of Systems   Constitutional: Negative.    HENT: Negative.     Eyes: Negative.    Respiratory: Negative.     Cardiovascular: Negative.    Gastrointestinal: Negative.    Genitourinary: Negative.    Musculoskeletal:  Positive for arthralgias and myalgias.   Skin: Negative.    Neurological: Negative.    Psychiatric/Behavioral: Negative.       Current updated HPI 3/27/2025: Noa Clark is a 50-year-old female that presents to the office today following up on her right total knee.  She states that most the time she is doing okay but she does have pain of the lateral aspect of the knee at times.    Prior HPI:Noa Clark is a 50 y.o. female who presents the office today to have her right knee evaluated.  She had a right total knee done about 11 months ago.  She states that she has pain in the right knee still along the anterior lateral aspect of the tibia and patella.  At times she feels like its sharp pain.  She does not feel unstable and she does not feel any significant swelling in the knee.  She has not had any falls.  She has lost about 70 pounds but she is unable to take anti-inflammatory medication now because of the weight loss surgery that she had.      Past Medical History:   Diagnosis Date    Anxiety     Depression     GERD (gastroesophageal reflux disease)     H/O echocardiogram 10/19/2023    Ef 55-60%. Grade I diastolic dysfunction. Normal left ventricular size and Mild-to-moderate pulmonic regurgitation present    Headache     migraines    Hx of exercise stress test 10/24/2023    Exercise tolerance is good. Exercised for 6:00 min on Gomez protocol achieving workload of 7.00 METS.    Hyperlipidemia     Hypertension     Osteoarthritis of right knee     Palpitation     PONV (postoperative nausea and vomiting)     Thyroid disease     Vitamin D deficiency        Past Surgical History:   Procedure Laterality Date    BREAST REDUCTION SURGERY Bilateral 2007    HYSTERECTOMY (CERVIX STATUS UNKNOWN)  2007    JOINT

## 2025-04-01 ENCOUNTER — OFFICE VISIT (OUTPATIENT)
Dept: CARDIOLOGY CLINIC | Age: 50
End: 2025-04-01
Payer: COMMERCIAL

## 2025-04-01 VITALS
WEIGHT: 188 LBS | BODY MASS INDEX: 35.5 KG/M2 | HEART RATE: 67 BPM | SYSTOLIC BLOOD PRESSURE: 112 MMHG | DIASTOLIC BLOOD PRESSURE: 72 MMHG | HEIGHT: 61 IN

## 2025-04-01 DIAGNOSIS — I10 PRIMARY HYPERTENSION: Primary | ICD-10-CM

## 2025-04-01 DIAGNOSIS — E78.5 DYSLIPIDEMIA: ICD-10-CM

## 2025-04-01 DIAGNOSIS — E66.01 MORBID OBESITY: ICD-10-CM

## 2025-04-01 PROCEDURE — 3074F SYST BP LT 130 MM HG: CPT | Performed by: NURSE PRACTITIONER

## 2025-04-01 PROCEDURE — 1036F TOBACCO NON-USER: CPT | Performed by: NURSE PRACTITIONER

## 2025-04-01 PROCEDURE — 99214 OFFICE O/P EST MOD 30 MIN: CPT | Performed by: NURSE PRACTITIONER

## 2025-04-01 PROCEDURE — 3078F DIAST BP <80 MM HG: CPT | Performed by: NURSE PRACTITIONER

## 2025-04-01 PROCEDURE — G8417 CALC BMI ABV UP PARAM F/U: HCPCS | Performed by: NURSE PRACTITIONER

## 2025-04-01 PROCEDURE — 93000 ELECTROCARDIOGRAM COMPLETE: CPT | Performed by: NURSE PRACTITIONER

## 2025-04-01 PROCEDURE — G8427 DOCREV CUR MEDS BY ELIG CLIN: HCPCS | Performed by: NURSE PRACTITIONER

## 2025-04-01 RX ORDER — ROSUVASTATIN CALCIUM 20 MG/1
20 TABLET, COATED ORAL DAILY
Qty: 30 TABLET | Refills: 3 | Status: SHIPPED | OUTPATIENT
Start: 2025-04-01

## 2025-04-01 ASSESSMENT — ENCOUNTER SYMPTOMS: SHORTNESS OF BREATH: 0

## 2025-04-01 NOTE — PROGRESS NOTES
[place] [address]  Phone: [phone]    Fax [phone]    Johnathon Breen MD, [facility]  Noe Alex MD, [facility]   Isai Grimaldo MD, [facility] MD Cem Hunter MD, [facility]  Masismo Guerin MD, [facility]    Nicci Estevez MD, [facility]   Lashon Mejia, APRN  Elsy Leon, APRN  Jihan Arana, APRN  Dariel Rosado, APRN        Cardiology Progress Note      4/1/2025    RE: Noa Clark  (1975)                             Primary cardiologist: Dr. Estevez       Subjective: Patient denies any chest pain, shortness of breath, dizziness, syncope, or palpitations.    CC:   1. Primary hypertension    2. Morbid obesity    3. Dyslipidemia        HPI: Noa Clark, who is a  50 y.o. year old female who presents to the office for follow up on chest pain,  HTN, and hyperlipidemia. Patient is  an active female who walks regularly. Patient is  compliant with medications.        Current Outpatient Medications   Medication Sig Dispense Refill    vitamin D (CHOLECALCIFEROL) 25 MCG (1000 UT) TABS tablet Take 1 tablet by mouth daily 30 tablet 5    famotidine (PEPCID) 20 MG tablet Take 1 tablet by mouth 2 times daily 60 tablet 5    senna (SENOKOT) 8.6 MG tablet Take 1 tablet by mouth 2 times daily 60 tablet 11    losartan (COZAAR) 25 MG tablet Take 1 tablet by mouth nightly 90 tablet 3    famotidine (PEPCID) 20 MG tablet Take 1 tablet by mouth 2 times daily 60 tablet 3    rOPINIRole (REQUIP) 1 MG tablet Take 1 tablet by mouth nightly      tiZANidine (ZANAFLEX) 4 MG tablet Take 1 tablet by mouth every 8 hours as needed (spasms) 30 tablet 0    levothyroxine (SYNTHROID) 25 MCG tablet Take 1 tablet by mouth Daily      QUEtiapine (SEROQUEL) 50 MG tablet       nitroGLYCERIN (NITROSTAT) 0.4 MG SL tablet Place 1 tablet under the tongue every 5 minutes as needed for Chest pain 25 tablet 3    zolpidem (AMBIEN) 5 MG tablet Take 1 tablet by mouth nightly as needed for Sleep.

## 2025-04-01 NOTE — ASSESSMENT & PLAN NOTE
-At or near goal No LDL- 193. Did patient stop taking lipitor? Previously 90       -The nature of cardiac risk has been fully discussed with this patient. I have made her aware of her LDL target goal given her cardiovascular risk analysis. I have discussed the appropriate diet. The need for lifelong compliance in order to reduce risk is stressed. A regular exercise program is recommended to help achieve and maintain normal body weight, fitness and improve lipid balance.

## 2025-04-01 NOTE — ASSESSMENT & PLAN NOTE
-Discussed the importance of diet and exercise and assisting with weight loss.  Patient informed of ideal body weight and high risk mortality associated with obesity.  Patient voices understanding. Recent gastric sleeve.

## 2025-04-14 ENCOUNTER — TELEPHONE (OUTPATIENT)
Dept: BARIATRICS/WEIGHT MGMT | Age: 50
End: 2025-04-14

## 2025-04-14 NOTE — TELEPHONE ENCOUNTER
Pt -sent an my chart encounter to the provider - tiffany -   Tiffany requested  the pt to be seen in office - office called pt and offered an appt 4/14/25 @ 1pm    Pt declined the appt  and made an appt for 4/21/25    Tiffany was informed    Office also reviewed with the pt - that the office or tiffany can not give an feed back about her c/o sent via [email or web address] until the pt is evaluated in the office.    Pt - voiced understanding and call was ended.

## 2025-04-17 ENCOUNTER — TELEPHONE (OUTPATIENT)
Dept: CARDIOLOGY CLINIC | Age: 50
End: 2025-04-17

## 2025-04-21 NOTE — TELEPHONE ENCOUNTER
Approved on April 18 by [employer] [employer] 2017  Your PA request for [id number] was approved for 365 days. The PA# assigned is [id number]. Approved medication: REPATHA 140 MG/ML SURECLICK  Effective Date: 4/17/2025  Authorization Expiration Date: 4/16/2026

## 2025-04-23 RX ORDER — FAMOTIDINE 20 MG/1
20 TABLET, FILM COATED ORAL 2 TIMES DAILY
Qty: 60 TABLET | Refills: 6 | OUTPATIENT
Start: 2025-04-23

## 2025-05-23 ENCOUNTER — TELEPHONE (OUTPATIENT)
Dept: NEUROSURGERY | Age: 50
End: 2025-05-23

## 2025-05-23 NOTE — TELEPHONE ENCOUNTER
Our Office received voicemail from patient. She wanted to cancel her appointment because she is going to a chiropractor now and is doing ok. I called back to see if she is going to reschedule or if she has refused services from Dr. Silverio.

## 2025-08-11 DIAGNOSIS — E78.5 DYSLIPIDEMIA: ICD-10-CM

## 2025-08-11 DIAGNOSIS — Z78.9 STATIN INTOLERANCE: ICD-10-CM

## 2025-08-11 RX ORDER — EVOLOCUMAB 140 MG/ML
INJECTION, SOLUTION SUBCUTANEOUS
Qty: 2 ML | Refills: 4 | Status: SHIPPED | OUTPATIENT
Start: 2025-08-11

## 2025-08-13 ENCOUNTER — OFFICE VISIT (OUTPATIENT)
Dept: ORTHOPEDIC SURGERY | Age: 50
End: 2025-08-13

## 2025-08-13 VITALS
WEIGHT: 167 LBS | HEIGHT: 61 IN | BODY MASS INDEX: 31.53 KG/M2 | OXYGEN SATURATION: 97 % | HEART RATE: 74 BPM | RESPIRATION RATE: 14 BRPM

## 2025-08-13 DIAGNOSIS — M18.12 ARTHRITIS OF CARPOMETACARPAL (CMC) JOINT OF LEFT THUMB: Primary | ICD-10-CM

## 2025-08-13 RX ORDER — TRIAMCINOLONE ACETONIDE 40 MG/ML
20 INJECTION, SUSPENSION INTRA-ARTICULAR; INTRAMUSCULAR ONCE
Status: COMPLETED | OUTPATIENT
Start: 2025-08-13 | End: 2025-08-13

## 2025-08-13 RX ADMIN — TRIAMCINOLONE ACETONIDE 20 MG: 40 INJECTION, SUSPENSION INTRA-ARTICULAR; INTRAMUSCULAR at 16:15

## 2025-08-14 ASSESSMENT — ENCOUNTER SYMPTOMS
RESPIRATORY NEGATIVE: 1
GASTROINTESTINAL NEGATIVE: 1
EYES NEGATIVE: 1

## (undated) DEVICE — SYRINGE MED 30ML STD CLR PLAS LUERLOCK TIP N CTRL DISP

## (undated) DEVICE — VESSEL SEALER EXTEND: Brand: ENDOWRIST

## (undated) DEVICE — LARGE SUTURE CUT NEEDLE DRIVER: Brand: ENDOWRIST

## (undated) DEVICE — Z DISCONTINUED USE 2764362 SEAL ENDOSCP INSTR DIA5-8MM UNIV FOR CANN DA VINCI XI

## (undated) DEVICE — CADIERE FORCEPS: Brand: ENDOWRIST

## (undated) DEVICE — INTEGRA® JARIT® VERRES NEEDLE, 4-3/4": Brand: INTEGRA® JARIT®

## (undated) DEVICE — TOWEL,OR,DSP,ST,BLUE,STD,6/PK,12PK/CS: Brand: MEDLINE

## (undated) DEVICE — GOWN,SIRUS,POLYRNF,BRTHSLV,XLN/XL,20/CS: Brand: MEDLINE

## (undated) DEVICE — [AGGRESSIVE PLUS CUTTER, ARTHROSCOPIC SHAVER BLADE,  DO NOT RESTERILIZE,  DO NOT USE IF PACKAGE IS DAMAGED,  KEEP DRY,  KEEP AWAY FROM SUNLIGHT]: Brand: FORMULA

## (undated) DEVICE — PADDING CAST W6INXL4YD COT LO LINTING WYTEX

## (undated) DEVICE — Device

## (undated) DEVICE — Z INACTIVE NO ACTIVE SUPPLIER APPLICATOR MEDICATED 26 CC TINT HI-LITE ORNG STRL CHLORAPREP

## (undated) DEVICE — APPLICATOR LAP 45CM FLX 2 VISTASEAL

## (undated) DEVICE — ELECTRODE ES AD CRDLSS PT RET REM POLYHESIVE

## (undated) DEVICE — GLOVE SURG SZ 75 L12IN FNGR THK79MIL GRN LTX FREE

## (undated) DEVICE — COLUMN DRAPE

## (undated) DEVICE — ENDOSCOPY KIT: Brand: MEDLINE INDUSTRIES, INC.

## (undated) DEVICE — TUBING PMP L16FT MAIN DISP FOR AR-6400 AR-6475

## (undated) DEVICE — KNEE POSITIONING KIT: Brand: DEVON

## (undated) DEVICE — ARTHROSCOPY PACK: Brand: MEDLINE INDUSTRIES, INC.

## (undated) DEVICE — STERILE LATEX POWDER-FREE SURGICAL GLOVESWITH NITRILE COATING: Brand: PROTEXIS

## (undated) DEVICE — TRI-LUMEN FILTERED TUBE SET WITH ACTIVATED CHARCOAL FILTER: Brand: AIRSEAL

## (undated) DEVICE — DUAL LUMEN STOMACH TUBE: Brand: SALEM SUMP

## (undated) DEVICE — GLOVE ORANGE PI 7 1/2   MSG9075

## (undated) DEVICE — COUNTER NDL 30 COUNT FOAM STRP SGL MAG

## (undated) DEVICE — PADDING CAST W6INXL4YD COT COHESIVE HND TEARABLE SPEC 100

## (undated) DEVICE — GLOVE ORANGE PI 8   MSG9080

## (undated) DEVICE — LINER,SEMI-RIGID,3000CC,50EA/CS: Brand: MEDLINE

## (undated) DEVICE — SEALANT TISS 10 ML FIBRIN VISTASEAL

## (undated) DEVICE — Z INACTIVE USE 2660663 SOLUTION IRRIG 1000ML STRIL H2O USP PLAS POUR BTL

## (undated) DEVICE — NEEDLE HYPO 20GA L1.5IN YEL POLYPR HUB S STL REG BVL STR

## (undated) DEVICE — POSITIONER,HEAD,RING CUSHION,9IN,32CS: Brand: MEDLINE

## (undated) DEVICE — POSITIONER HD AD W4.5XH8XL9IN HIGHLY RESILIENT FOAM CMFRT

## (undated) DEVICE — SURGICEL ENDOSCP APPL

## (undated) DEVICE — ADHESIVE SKIN CLSR 0.7ML TOP DERMBND ADV

## (undated) DEVICE — SET TBNG DISP TIP FOR AHTO

## (undated) DEVICE — PACK ARTHRO III ST SIRUS

## (undated) DEVICE — BANDAGE,ELASTIC,ESMARK,STERILE,6"X9',LF: Brand: MEDLINE

## (undated) DEVICE — SUTURE ETHLN SZ 3-0 L18IN NONABSORBABLE BLK FS-1 L24MM 3/8 663H

## (undated) DEVICE — 34" SINGLE PATIENT USE HOVERMATT BREATHABLE: Brand: SINGLE PATIENT USE HOVERMATT

## (undated) DEVICE — Z DISCONTINUED USE 2220241 SUTURE SZ 0 27IN 5/8 CIR UR-6  TAPER PT VIOLET ABSRB VICRYL J603H

## (undated) DEVICE — STAPLER 60: Brand: SUREFORM

## (undated) DEVICE — GAUZE,SPONGE,4"X4",16PLY,XRAY,STRL,LF: Brand: MEDLINE

## (undated) DEVICE — GLOVE SURG SZ 8 L12IN THK75MIL DK GRN LTX FREE

## (undated) DEVICE — AGENT HEMSTAT 3GM OXIDIZED REGENERATED CELOS ABSRB FOR CONT (ORDER MULTIPLES OF 5EA)

## (undated) DEVICE — GLOVE SURG SZ 7 L12IN FNGR THK79MIL GRN LTX FREE

## (undated) DEVICE — LAPAROSCOPIC TROCAR SLEEVE/SINGLE USE: Brand: KII® OPTICAL ACCESS SYSTEM

## (undated) DEVICE — DRAPE SHEET ULTRAGARD: Brand: MEDLINE

## (undated) DEVICE — STAPLER 60 RELOAD WHITE: Brand: SUREFORM

## (undated) DEVICE — SOLUTION IRRIG 1000ML STRL H2O USP PLAS POUR BTL

## (undated) DEVICE — AGENT HEMSTAT W2XL4IN OXIDIZED REGENERATED CELOS ABSRB

## (undated) DEVICE — STAPLER 60 RELOAD BLUE: Brand: SUREFORM

## (undated) DEVICE — APPLICATOR MEDICATED 26 CC SOLUTION HI LT ORNG CHLORAPREP

## (undated) DEVICE — ARM DRAPE

## (undated) DEVICE — FORCEPS BX L240CM JAW DIA2.8MM L CAP W/ NDL MIC MESH TOOTH

## (undated) DEVICE — SEAL

## (undated) DEVICE — PROTECTOR EYE PT SELF ADH NS OPT GRD LF

## (undated) DEVICE — BLADELESS OBTURATOR: Brand: WECK VISTA

## (undated) DEVICE — GLOVE ORANGE PI 7   MSG9070

## (undated) DEVICE — Z DISCONTINUED (USE MFG CAT MVABO)  TUBING GAS SAMPLING STD 6.5 FT FEMALE CONN SMRT CAPNOLINE

## (undated) DEVICE — PENCIL ES CRD L10FT HND SWCHING ROCK SWCH W/ EDGE COAT BLDE

## (undated) DEVICE — WEREWOLF FLOW 50 COBLATION WAND: Brand: COBLATION

## (undated) DEVICE — SUTURE VICRYL SZ 4-0 L18IN ABSRB UD L19MM PS-2 3/8 CIR PRIM J496H

## (undated) DEVICE — ZIMMER® STERILE DISPOSABLE TOURNIQUET CUFF WITH PLC, DUAL PORT, SINGLE BLADDER, 42 IN. (107 CM)

## (undated) DEVICE — DISPOSABLE GRASPER: Brand: EPIX LAPAROSCOPIC GRASPER

## (undated) DEVICE — SYRINGE MED 20ML STD CLR PLAS LUERLOCK TIP N CTRL DISP